# Patient Record
Sex: MALE | Race: WHITE | NOT HISPANIC OR LATINO | Employment: OTHER | URBAN - NONMETROPOLITAN AREA
[De-identification: names, ages, dates, MRNs, and addresses within clinical notes are randomized per-mention and may not be internally consistent; named-entity substitution may affect disease eponyms.]

---

## 2017-01-10 ENCOUNTER — COMMUNICATION - GICH (OUTPATIENT)
Dept: INTERNAL MEDICINE | Facility: OTHER | Age: 47
End: 2017-01-10

## 2017-01-10 ENCOUNTER — AMBULATORY - GICH (OUTPATIENT)
Dept: SCHEDULING | Facility: OTHER | Age: 47
End: 2017-01-10

## 2017-03-01 ENCOUNTER — COMMUNICATION - GICH (OUTPATIENT)
Dept: INTERNAL MEDICINE | Facility: OTHER | Age: 47
End: 2017-03-01

## 2017-04-17 ENCOUNTER — COMMUNICATION - GICH (OUTPATIENT)
Dept: INTERNAL MEDICINE | Facility: OTHER | Age: 47
End: 2017-04-17

## 2017-04-21 ENCOUNTER — OFFICE VISIT - GICH (OUTPATIENT)
Dept: INTERNAL MEDICINE | Facility: OTHER | Age: 47
End: 2017-04-21

## 2017-04-21 ENCOUNTER — AMBULATORY - GICH (OUTPATIENT)
Dept: SCHEDULING | Facility: OTHER | Age: 47
End: 2017-04-21

## 2017-04-21 ENCOUNTER — HISTORY (OUTPATIENT)
Dept: INTERNAL MEDICINE | Facility: OTHER | Age: 47
End: 2017-04-21

## 2017-04-21 DIAGNOSIS — E66.01 MORBID (SEVERE) OBESITY DUE TO EXCESS CALORIES (H): ICD-10-CM

## 2017-04-21 DIAGNOSIS — M54.42 LOW BACK PAIN WITH LEFT-SIDED SCIATICA: ICD-10-CM

## 2017-04-21 DIAGNOSIS — G89.29 OTHER CHRONIC PAIN: ICD-10-CM

## 2017-04-21 DIAGNOSIS — M15.0 PRIMARY GENERALIZED (OSTEO)ARTHRITIS: ICD-10-CM

## 2017-04-21 DIAGNOSIS — E78.2 MIXED HYPERLIPIDEMIA: ICD-10-CM

## 2017-04-21 DIAGNOSIS — F32.9 MAJOR DEPRESSIVE DISORDER, SINGLE EPISODE: ICD-10-CM

## 2017-04-21 DIAGNOSIS — F41.9 ANXIETY DISORDER: ICD-10-CM

## 2017-04-21 DIAGNOSIS — R73.9 HYPERGLYCEMIA: ICD-10-CM

## 2017-04-21 DIAGNOSIS — E11.65 TYPE 2 DIABETES MELLITUS WITH HYPERGLYCEMIA (H): ICD-10-CM

## 2017-04-21 LAB
A/G RATIO - HISTORICAL: 1.9 (ref 1–2)
ALBUMIN SERPL-MCNC: 4 G/DL (ref 3.5–5.7)
ALP SERPL-CCNC: 41 IU/L (ref 34–104)
ALT (SGPT) - HISTORICAL: 49 IU/L (ref 7–52)
ANION GAP - HISTORICAL: 9 (ref 5–18)
AST SERPL-CCNC: 37 IU/L (ref 13–39)
BILIRUB SERPL-MCNC: 1 MG/DL (ref 0.3–1)
BUN SERPL-MCNC: 12 MG/DL (ref 7–25)
BUN/CREAT RATIO - HISTORICAL: 13
CALCIUM SERPL-MCNC: 9.3 MG/DL (ref 8.6–10.3)
CHLORIDE SERPLBLD-SCNC: 105 MMOL/L (ref 98–107)
CHOL/HDL RATIO - HISTORICAL: 6.35
CHOLESTEROL TOTAL: 235 MG/DL
CO2 SERPL-SCNC: 22 MMOL/L (ref 21–31)
CREAT SERPL-MCNC: 0.9 MG/DL (ref 0.7–1.3)
ERYTHROCYTE [DISTWIDTH] IN BLOOD BY AUTOMATED COUNT: 12.9 % (ref 11.5–15.5)
ESTIMATED AVERAGE GLUCOSE: 246 MG/DL
GFR IF NOT AFRICAN AMERICAN - HISTORICAL: >60 ML/MIN/1.73M2
GLOBULIN - HISTORICAL: 2.1 G/DL (ref 2–3.7)
GLUCOSE SERPL-MCNC: 303 MG/DL (ref 70–105)
HCT VFR BLD AUTO: 50.1 % (ref 37–53)
HDLC SERPL-MCNC: 37 MG/DL (ref 23–92)
HEMOGLOBIN A1C MONITORING (POCT) - HISTORICAL: 10.2 % (ref 4–6.2)
HEMOGLOBIN: 16.5 G/DL (ref 13.5–17.5)
LDLC SERPL CALC-MCNC: 128 MG/DL
MCH RBC QN AUTO: 26.7 PG (ref 26–34)
MCHC RBC AUTO-ENTMCNC: 32.9 G/DL (ref 32–36)
MCV RBC AUTO: 81 FL (ref 80–100)
NON-HDL CHOLESTEROL - HISTORICAL: 198 MG/DL
PATIENT STATUS - HISTORICAL: ABNORMAL
PLATELET # BLD AUTO: 141 THOU/CU MM (ref 140–440)
PMV BLD: 9.3 FL (ref 6.5–11)
POTASSIUM SERPL-SCNC: 4 MMOL/L (ref 3.5–5.1)
PROT SERPL-MCNC: 6.1 G/DL (ref 6.4–8.9)
RED BLOOD COUNT - HISTORICAL: 6.16 MIL/CU MM (ref 4.3–5.9)
SODIUM SERPL-SCNC: 136 MMOL/L (ref 133–143)
TRIGL SERPL-MCNC: 349 MG/DL
WHITE BLOOD COUNT - HISTORICAL: 4.6 THOU/CU MM (ref 4.5–11)

## 2017-04-21 ASSESSMENT — ANXIETY QUESTIONNAIRES
3. WORRYING TOO MUCH ABOUT DIFFERENT THINGS: NOT AT ALL
GAD7 TOTAL SCORE: 0
2. NOT BEING ABLE TO STOP OR CONTROL WORRYING: NOT AT ALL
6. BECOMING EASILY ANNOYED OR IRRITABLE: NOT AT ALL
1. FEELING NERVOUS, ANXIOUS, OR ON EDGE: NOT AT ALL
7. FEELING AFRAID AS IF SOMETHING AWFUL MIGHT HAPPEN: NOT AT ALL
5. BEING SO RESTLESS THAT IT IS HARD TO SIT STILL: NOT AT ALL
4. TROUBLE RELAXING: NOT AT ALL

## 2017-04-21 ASSESSMENT — PATIENT HEALTH QUESTIONNAIRE - PHQ9: SUM OF ALL RESPONSES TO PHQ QUESTIONS 1-9: 0

## 2017-04-25 ENCOUNTER — COMMUNICATION - GICH (OUTPATIENT)
Dept: INTERNAL MEDICINE | Facility: OTHER | Age: 47
End: 2017-04-25

## 2017-04-25 ENCOUNTER — AMBULATORY - GICH (OUTPATIENT)
Dept: SCHEDULING | Facility: OTHER | Age: 47
End: 2017-04-25

## 2017-05-01 ENCOUNTER — AMBULATORY - GICH (OUTPATIENT)
Dept: INTERNAL MEDICINE | Facility: OTHER | Age: 47
End: 2017-05-01

## 2017-05-01 DIAGNOSIS — G89.29 OTHER CHRONIC PAIN: ICD-10-CM

## 2017-05-01 DIAGNOSIS — M54.42 LOW BACK PAIN WITH LEFT-SIDED SCIATICA: ICD-10-CM

## 2017-05-01 DIAGNOSIS — M48.061 SPINAL STENOSIS OF LUMBAR REGION: ICD-10-CM

## 2017-05-15 ENCOUNTER — HOSPITAL ENCOUNTER (OUTPATIENT)
Dept: RADIOLOGY | Facility: OTHER | Age: 47
End: 2017-05-15
Attending: INTERNAL MEDICINE

## 2017-05-15 DIAGNOSIS — M15.0 PRIMARY GENERALIZED (OSTEO)ARTHRITIS: ICD-10-CM

## 2017-05-24 ENCOUNTER — AMBULATORY - GICH (OUTPATIENT)
Dept: SCHEDULING | Facility: OTHER | Age: 47
End: 2017-05-24

## 2017-06-26 ENCOUNTER — COMMUNICATION - GICH (OUTPATIENT)
Dept: INTERNAL MEDICINE | Facility: OTHER | Age: 47
End: 2017-06-26

## 2017-06-26 ENCOUNTER — HOSPITAL ENCOUNTER (OUTPATIENT)
Dept: RADIOLOGY | Facility: OTHER | Age: 47
End: 2017-06-26
Attending: INTERNAL MEDICINE

## 2017-06-26 DIAGNOSIS — M54.42 LOW BACK PAIN WITH LEFT-SIDED SCIATICA: ICD-10-CM

## 2017-06-26 DIAGNOSIS — M79.644 PAIN OF FINGER OF RIGHT HAND: ICD-10-CM

## 2017-06-26 DIAGNOSIS — M79.645 PAIN OF FINGER OF LEFT HAND: ICD-10-CM

## 2017-06-26 DIAGNOSIS — G89.29 OTHER CHRONIC PAIN: ICD-10-CM

## 2017-06-29 ENCOUNTER — COMMUNICATION - GICH (OUTPATIENT)
Dept: INTERNAL MEDICINE | Facility: OTHER | Age: 47
End: 2017-06-29

## 2017-06-29 DIAGNOSIS — M15.0 PRIMARY GENERALIZED (OSTEO)ARTHRITIS: ICD-10-CM

## 2017-07-09 ENCOUNTER — AMBULATORY - GICH (OUTPATIENT)
Dept: SCHEDULING | Facility: OTHER | Age: 47
End: 2017-07-09

## 2017-07-11 ENCOUNTER — COMMUNICATION - GICH (OUTPATIENT)
Dept: INTERNAL MEDICINE | Facility: OTHER | Age: 47
End: 2017-07-11

## 2017-07-14 ENCOUNTER — HOSPITAL ENCOUNTER (OUTPATIENT)
Dept: RADIOLOGY | Facility: OTHER | Age: 47
End: 2017-07-14
Attending: INTERNAL MEDICINE

## 2017-07-14 DIAGNOSIS — M15.0 PRIMARY GENERALIZED (OSTEO)ARTHRITIS: ICD-10-CM

## 2017-07-26 ENCOUNTER — COMMUNICATION - GICH (OUTPATIENT)
Dept: FAMILY MEDICINE | Facility: OTHER | Age: 47
End: 2017-07-26

## 2017-07-26 DIAGNOSIS — E11.65 TYPE 2 DIABETES MELLITUS WITH HYPERGLYCEMIA (H): ICD-10-CM

## 2017-07-27 ENCOUNTER — OFFICE VISIT - GICH (OUTPATIENT)
Dept: INTERNAL MEDICINE | Facility: OTHER | Age: 47
End: 2017-07-27

## 2017-07-27 ENCOUNTER — HISTORY (OUTPATIENT)
Dept: INTERNAL MEDICINE | Facility: OTHER | Age: 47
End: 2017-07-27

## 2017-07-27 DIAGNOSIS — F41.9 ANXIETY DISORDER: ICD-10-CM

## 2017-07-27 DIAGNOSIS — F32.9 MAJOR DEPRESSIVE DISORDER, SINGLE EPISODE: ICD-10-CM

## 2017-07-27 DIAGNOSIS — M25.561 PAIN IN RIGHT KNEE: ICD-10-CM

## 2017-07-27 DIAGNOSIS — E11.65 TYPE 2 DIABETES MELLITUS WITH HYPERGLYCEMIA (H): ICD-10-CM

## 2017-07-27 DIAGNOSIS — Z99.89 DEPENDENCE ON OTHER ENABLING MACHINES AND DEVICES: ICD-10-CM

## 2017-07-27 DIAGNOSIS — M62.830 MUSCLE SPASM OF BACK: ICD-10-CM

## 2017-07-27 DIAGNOSIS — G47.33 OBSTRUCTIVE SLEEP APNEA: ICD-10-CM

## 2017-07-27 DIAGNOSIS — M15.0 PRIMARY GENERALIZED (OSTEO)ARTHRITIS: ICD-10-CM

## 2017-07-27 DIAGNOSIS — E66.01 MORBID (SEVERE) OBESITY DUE TO EXCESS CALORIES (H): ICD-10-CM

## 2017-07-27 DIAGNOSIS — R73.9 HYPERGLYCEMIA: ICD-10-CM

## 2017-07-27 DIAGNOSIS — M25.562 PAIN IN LEFT KNEE: ICD-10-CM

## 2017-07-27 DIAGNOSIS — G89.29 OTHER CHRONIC PAIN: ICD-10-CM

## 2017-07-27 DIAGNOSIS — E78.2 MIXED HYPERLIPIDEMIA: ICD-10-CM

## 2017-07-27 LAB
A/G RATIO - HISTORICAL: 1.4 (ref 1–2)
ALB RAND URINE - HISTORICAL: 9.3 MG/L
ALBUMIN SERPL-MCNC: 4.2 G/DL (ref 3.5–5.7)
ALP SERPL-CCNC: 34 IU/L (ref 34–104)
ALT (SGPT) - HISTORICAL: 35 IU/L (ref 7–52)
ANION GAP - HISTORICAL: 12 (ref 5–18)
AST SERPL-CCNC: 23 IU/L (ref 13–39)
BACTERIA URINE: NORMAL BACTERIA/HPF
BILIRUB SERPL-MCNC: 1.1 MG/DL (ref 0.3–1)
BILIRUB UR QL: NEGATIVE
BUN SERPL-MCNC: 15 MG/DL (ref 7–25)
BUN/CREAT RATIO - HISTORICAL: 17
CALCIUM SERPL-MCNC: 9.6 MG/DL (ref 8.6–10.3)
CHLORIDE SERPLBLD-SCNC: 102 MMOL/L (ref 98–107)
CHOL/HDL RATIO - HISTORICAL: 4.76
CHOLESTEROL TOTAL: 233 MG/DL
CLARITY, URINE: CLEAR CLARITY
CO2 SERPL-SCNC: 25 MMOL/L (ref 21–31)
COLOR UR: YELLOW COLOR
CREAT SERPL-MCNC: 0.86 MG/DL (ref 0.7–1.3)
CREATININE, URINE - HISTORICAL: 1.93 G/L
EPITHELIAL CELLS: NORMAL EPI/HPF
ERYTHROCYTE [DISTWIDTH] IN BLOOD BY AUTOMATED COUNT: 14.2 % (ref 11.5–15.5)
ESTIMATED AVERAGE GLUCOSE: 206 MG/DL
GFR IF NOT AFRICAN AMERICAN - HISTORICAL: >60 ML/MIN/1.73M2
GLOBULIN - HISTORICAL: 2.9 G/DL (ref 2–3.7)
GLUCOSE SERPL-MCNC: 178 MG/DL (ref 70–105)
GLUCOSE URINE: 100 MG/DL
HCT VFR BLD AUTO: 50.5 % (ref 37–53)
HDLC SERPL-MCNC: 49 MG/DL (ref 23–92)
HEMOGLOBIN A1C MONITORING (POCT) - HISTORICAL: 8.8 % (ref 4–6.2)
HEMOGLOBIN: 16.8 G/DL (ref 13.5–17.5)
KETONES UR QL: NEGATIVE MG/DL
LDLC SERPL CALC-MCNC: 145 MG/DL
LEUKOCYTE ESTERASE URINE: NEGATIVE
MCH RBC QN AUTO: 26.8 PG (ref 26–34)
MCHC RBC AUTO-ENTMCNC: 33.3 G/DL (ref 32–36)
MCV RBC AUTO: 80 FL (ref 80–100)
MICROALBUMIN, RAND UR - HISTORICAL: 4.8 MG/G CREAT
NITRITE UR QL STRIP: NEGATIVE
NON-HDL CHOLESTEROL - HISTORICAL: 184 MG/DL
OCCULT BLOOD,URINE - HISTORICAL: NEGATIVE
PATIENT STATUS - HISTORICAL: ABNORMAL
PH UR: 5.5 [PH]
PLATELET # BLD AUTO: 152 THOU/CU MM (ref 140–440)
PMV BLD: 10.9 FL (ref 6.5–11)
POTASSIUM SERPL-SCNC: 4.3 MMOL/L (ref 3.5–5.1)
PROT SERPL-MCNC: 7.1 G/DL (ref 6.4–8.9)
PROTEIN QUALITATIVE,URINE - HISTORICAL: NEGATIVE MG/DL
RBC - HISTORICAL: NORMAL /HPF
RED BLOOD COUNT - HISTORICAL: 6.28 MIL/CU MM (ref 4.3–5.9)
SODIUM SERPL-SCNC: 139 MMOL/L (ref 133–143)
SP GR UR STRIP: 1.02
TRIGL SERPL-MCNC: 194 MG/DL
UROBILINOGEN,QUALITATIVE - HISTORICAL: NORMAL EU/DL
WBC - HISTORICAL: NORMAL /HPF
WHITE BLOOD COUNT - HISTORICAL: 6.8 THOU/CU MM (ref 4.5–11)

## 2017-07-27 ASSESSMENT — ANXIETY QUESTIONNAIRES
4. TROUBLE RELAXING: NOT AT ALL
GAD7 TOTAL SCORE: 0
7. FEELING AFRAID AS IF SOMETHING AWFUL MIGHT HAPPEN: NOT AT ALL
5. BEING SO RESTLESS THAT IT IS HARD TO SIT STILL: NOT AT ALL
2. NOT BEING ABLE TO STOP OR CONTROL WORRYING: NOT AT ALL
1. FEELING NERVOUS, ANXIOUS, OR ON EDGE: NOT AT ALL
6. BECOMING EASILY ANNOYED OR IRRITABLE: NOT AT ALL
3. WORRYING TOO MUCH ABOUT DIFFERENT THINGS: NOT AT ALL

## 2017-08-17 ENCOUNTER — AMBULATORY - GICH (OUTPATIENT)
Dept: INTERNAL MEDICINE | Facility: OTHER | Age: 47
End: 2017-08-17

## 2017-08-17 DIAGNOSIS — M18.0 PRIMARY OSTEOARTHRITIS OF BOTH FIRST CARPOMETACARPAL JOINTS: ICD-10-CM

## 2017-08-21 ENCOUNTER — COMMUNICATION - GICH (OUTPATIENT)
Dept: INTERNAL MEDICINE | Facility: OTHER | Age: 47
End: 2017-08-21

## 2017-08-21 ENCOUNTER — AMBULATORY - GICH (OUTPATIENT)
Dept: INTERNAL MEDICINE | Facility: OTHER | Age: 47
End: 2017-08-21

## 2017-08-21 DIAGNOSIS — M18.0 PRIMARY OSTEOARTHRITIS OF BOTH FIRST CARPOMETACARPAL JOINTS: ICD-10-CM

## 2017-08-23 ENCOUNTER — COMMUNICATION - GICH (OUTPATIENT)
Dept: INTERNAL MEDICINE | Facility: OTHER | Age: 47
End: 2017-08-23

## 2017-08-23 ENCOUNTER — HOSPITAL ENCOUNTER (OUTPATIENT)
Dept: RADIOLOGY | Facility: OTHER | Age: 47
End: 2017-08-23
Attending: INTERNAL MEDICINE

## 2017-08-23 DIAGNOSIS — M18.0 PRIMARY OSTEOARTHRITIS OF BOTH FIRST CARPOMETACARPAL JOINTS: ICD-10-CM

## 2017-08-29 ENCOUNTER — AMBULATORY - GICH (OUTPATIENT)
Dept: SCHEDULING | Facility: OTHER | Age: 47
End: 2017-08-29

## 2017-09-06 ENCOUNTER — COMMUNICATION - GICH (OUTPATIENT)
Dept: INTERNAL MEDICINE | Facility: OTHER | Age: 47
End: 2017-09-06

## 2017-09-08 ENCOUNTER — HISTORY (OUTPATIENT)
Dept: INTERNAL MEDICINE | Facility: OTHER | Age: 47
End: 2017-09-08

## 2017-09-08 ENCOUNTER — OFFICE VISIT - GICH (OUTPATIENT)
Dept: INTERNAL MEDICINE | Facility: OTHER | Age: 47
End: 2017-09-08

## 2017-09-08 ENCOUNTER — COMMUNICATION - GICH (OUTPATIENT)
Dept: INTERNAL MEDICINE | Facility: OTHER | Age: 47
End: 2017-09-08

## 2017-09-08 DIAGNOSIS — G47.33 OBSTRUCTIVE SLEEP APNEA: ICD-10-CM

## 2017-09-08 DIAGNOSIS — M18.0 PRIMARY OSTEOARTHRITIS OF BOTH FIRST CARPOMETACARPAL JOINTS: ICD-10-CM

## 2017-09-08 DIAGNOSIS — E55.9 VITAMIN D DEFICIENCY: ICD-10-CM

## 2017-09-08 DIAGNOSIS — Z99.89 DEPENDENCE ON OTHER ENABLING MACHINES AND DEVICES: ICD-10-CM

## 2017-09-08 DIAGNOSIS — F41.9 ANXIETY DISORDER: ICD-10-CM

## 2017-09-08 DIAGNOSIS — R73.9 HYPERGLYCEMIA: ICD-10-CM

## 2017-09-08 DIAGNOSIS — F32.9 MAJOR DEPRESSIVE DISORDER, SINGLE EPISODE: ICD-10-CM

## 2017-09-08 DIAGNOSIS — E11.65 TYPE 2 DIABETES MELLITUS WITH HYPERGLYCEMIA (H): ICD-10-CM

## 2017-09-08 ASSESSMENT — PATIENT HEALTH QUESTIONNAIRE - PHQ9: SUM OF ALL RESPONSES TO PHQ QUESTIONS 1-9: 0

## 2017-09-18 ENCOUNTER — COMMUNICATION - GICH (OUTPATIENT)
Dept: INTERNAL MEDICINE | Facility: OTHER | Age: 47
End: 2017-09-18

## 2017-09-18 DIAGNOSIS — F13.239: ICD-10-CM

## 2017-09-26 ENCOUNTER — COMMUNICATION - GICH (OUTPATIENT)
Dept: INTERNAL MEDICINE | Facility: OTHER | Age: 47
End: 2017-09-26

## 2017-09-26 DIAGNOSIS — F41.9 ANXIETY DISORDER: ICD-10-CM

## 2017-09-26 DIAGNOSIS — F13.239: ICD-10-CM

## 2017-10-04 ENCOUNTER — OFFICE VISIT - GICH (OUTPATIENT)
Dept: INTERNAL MEDICINE | Facility: OTHER | Age: 47
End: 2017-10-04

## 2017-10-04 ENCOUNTER — HISTORY (OUTPATIENT)
Dept: INTERNAL MEDICINE | Facility: OTHER | Age: 47
End: 2017-10-04

## 2017-10-04 DIAGNOSIS — M18.0 PRIMARY OSTEOARTHRITIS OF BOTH FIRST CARPOMETACARPAL JOINTS: ICD-10-CM

## 2017-10-04 DIAGNOSIS — F41.8 OTHER SPECIFIED ANXIETY DISORDERS: ICD-10-CM

## 2017-10-04 DIAGNOSIS — Z23 ENCOUNTER FOR IMMUNIZATION: ICD-10-CM

## 2017-10-04 DIAGNOSIS — M15.0 PRIMARY GENERALIZED (OSTEO)ARTHRITIS: ICD-10-CM

## 2017-10-04 ASSESSMENT — ANXIETY QUESTIONNAIRES
2. NOT BEING ABLE TO STOP OR CONTROL WORRYING: NOT AT ALL
6. BECOMING EASILY ANNOYED OR IRRITABLE: NOT AT ALL
3. WORRYING TOO MUCH ABOUT DIFFERENT THINGS: NOT AT ALL
1. FEELING NERVOUS, ANXIOUS, OR ON EDGE: NOT AT ALL
4. TROUBLE RELAXING: NOT AT ALL
GAD7 TOTAL SCORE: 0
5. BEING SO RESTLESS THAT IT IS HARD TO SIT STILL: NOT AT ALL
7. FEELING AFRAID AS IF SOMETHING AWFUL MIGHT HAPPEN: NOT AT ALL

## 2017-10-04 ASSESSMENT — PATIENT HEALTH QUESTIONNAIRE - PHQ9: SUM OF ALL RESPONSES TO PHQ QUESTIONS 1-9: 0

## 2017-10-09 ENCOUNTER — COMMUNICATION - GICH (OUTPATIENT)
Dept: INTERNAL MEDICINE | Facility: OTHER | Age: 47
End: 2017-10-09

## 2017-10-09 DIAGNOSIS — M18.0 PRIMARY OSTEOARTHRITIS OF BOTH FIRST CARPOMETACARPAL JOINTS: ICD-10-CM

## 2017-10-09 DIAGNOSIS — F41.8 OTHER SPECIFIED ANXIETY DISORDERS: ICD-10-CM

## 2017-10-10 ENCOUNTER — HISTORY (OUTPATIENT)
Dept: INTERNAL MEDICINE | Facility: OTHER | Age: 47
End: 2017-10-10

## 2017-10-10 ENCOUNTER — OFFICE VISIT - GICH (OUTPATIENT)
Dept: INTERNAL MEDICINE | Facility: OTHER | Age: 47
End: 2017-10-10

## 2017-10-10 DIAGNOSIS — M15.0 PRIMARY GENERALIZED (OSTEO)ARTHRITIS: ICD-10-CM

## 2017-10-10 DIAGNOSIS — M18.0 PRIMARY OSTEOARTHRITIS OF BOTH FIRST CARPOMETACARPAL JOINTS: ICD-10-CM

## 2017-10-10 DIAGNOSIS — F41.8 OTHER SPECIFIED ANXIETY DISORDERS: ICD-10-CM

## 2017-10-10 ASSESSMENT — ANXIETY QUESTIONNAIRES
2. NOT BEING ABLE TO STOP OR CONTROL WORRYING: NOT AT ALL
3. WORRYING TOO MUCH ABOUT DIFFERENT THINGS: NOT AT ALL
6. BECOMING EASILY ANNOYED OR IRRITABLE: NOT AT ALL
1. FEELING NERVOUS, ANXIOUS, OR ON EDGE: NOT AT ALL
4. TROUBLE RELAXING: NOT AT ALL
GAD7 TOTAL SCORE: 0
5. BEING SO RESTLESS THAT IT IS HARD TO SIT STILL: NOT AT ALL
7. FEELING AFRAID AS IF SOMETHING AWFUL MIGHT HAPPEN: NOT AT ALL

## 2017-10-10 ASSESSMENT — PATIENT HEALTH QUESTIONNAIRE - PHQ9: SUM OF ALL RESPONSES TO PHQ QUESTIONS 1-9: 0

## 2017-10-13 ENCOUNTER — AMBULATORY - GICH (OUTPATIENT)
Dept: SCHEDULING | Facility: OTHER | Age: 47
End: 2017-10-13

## 2017-11-09 ENCOUNTER — COMMUNICATION - GICH (OUTPATIENT)
Dept: INTERNAL MEDICINE | Facility: OTHER | Age: 47
End: 2017-11-09

## 2017-12-05 ENCOUNTER — COMMUNICATION - GICH (OUTPATIENT)
Dept: INTERNAL MEDICINE | Facility: OTHER | Age: 47
End: 2017-12-05

## 2017-12-05 DIAGNOSIS — M15.0 PRIMARY GENERALIZED (OSTEO)ARTHRITIS: ICD-10-CM

## 2017-12-05 DIAGNOSIS — M62.830 MUSCLE SPASM OF BACK: ICD-10-CM

## 2017-12-12 ENCOUNTER — AMBULATORY - GICH (OUTPATIENT)
Dept: INTERNAL MEDICINE | Facility: OTHER | Age: 47
End: 2017-12-12

## 2017-12-12 ENCOUNTER — HISTORY (OUTPATIENT)
Dept: INTERNAL MEDICINE | Facility: OTHER | Age: 47
End: 2017-12-12

## 2017-12-12 DIAGNOSIS — Z99.89 DEPENDENCE ON OTHER ENABLING MACHINES AND DEVICES: ICD-10-CM

## 2017-12-12 DIAGNOSIS — R73.9 HYPERGLYCEMIA: ICD-10-CM

## 2017-12-12 DIAGNOSIS — E78.2 MIXED HYPERLIPIDEMIA: ICD-10-CM

## 2017-12-12 DIAGNOSIS — T38.0X5A ADVERSE EFFECT OF GLUCOCORTICOID OR SYNTHETIC ANALOGUE: ICD-10-CM

## 2017-12-12 DIAGNOSIS — F41.8 OTHER SPECIFIED ANXIETY DISORDERS: ICD-10-CM

## 2017-12-12 DIAGNOSIS — E11.9 TYPE 2 DIABETES MELLITUS WITHOUT COMPLICATIONS (H): ICD-10-CM

## 2017-12-12 DIAGNOSIS — M18.0 PRIMARY OSTEOARTHRITIS OF BOTH FIRST CARPOMETACARPAL JOINTS: ICD-10-CM

## 2017-12-12 DIAGNOSIS — Z01.818 ENCOUNTER FOR OTHER PREPROCEDURAL EXAMINATION: ICD-10-CM

## 2017-12-12 DIAGNOSIS — G47.33 OBSTRUCTIVE SLEEP APNEA: ICD-10-CM

## 2017-12-12 DIAGNOSIS — E55.9 VITAMIN D DEFICIENCY: ICD-10-CM

## 2017-12-12 DIAGNOSIS — E66.01 MORBID (SEVERE) OBESITY DUE TO EXCESS CALORIES (H): ICD-10-CM

## 2017-12-12 LAB
A/G RATIO - HISTORICAL: 1.3 (ref 1–2)
ALBUMIN SERPL-MCNC: 4 G/DL (ref 3.5–5.7)
ALP SERPL-CCNC: 36 IU/L (ref 34–104)
ALT (SGPT) - HISTORICAL: 28 IU/L (ref 7–52)
ANION GAP - HISTORICAL: 10 (ref 5–18)
AST SERPL-CCNC: 26 IU/L (ref 13–39)
BILIRUB SERPL-MCNC: 1 MG/DL (ref 0.3–1)
BUN SERPL-MCNC: 14 MG/DL (ref 7–25)
BUN/CREAT RATIO - HISTORICAL: 19
CALCIUM SERPL-MCNC: 9.1 MG/DL (ref 8.6–10.3)
CHLORIDE SERPLBLD-SCNC: 104 MMOL/L (ref 98–107)
CHOL/HDL RATIO - HISTORICAL: 5.33
CHOLESTEROL TOTAL: 229 MG/DL
CO2 SERPL-SCNC: 22 MMOL/L (ref 21–31)
CREAT SERPL-MCNC: 0.75 MG/DL (ref 0.7–1.3)
ERYTHROCYTE [DISTWIDTH] IN BLOOD BY AUTOMATED COUNT: 13.9 % (ref 11.5–15.5)
ESTIMATED AVERAGE GLUCOSE: 171 MG/DL
GFR IF NOT AFRICAN AMERICAN - HISTORICAL: >60 ML/MIN/1.73M2
GLOBULIN - HISTORICAL: 3.2 G/DL (ref 2–3.7)
GLUCOSE SERPL-MCNC: 107 MG/DL (ref 70–105)
HCT VFR BLD AUTO: 50.1 % (ref 37–53)
HDLC SERPL-MCNC: 43 MG/DL (ref 23–92)
HEMOGLOBIN A1C MONITORING (POCT) - HISTORICAL: 7.6 % (ref 4–6.2)
HEMOGLOBIN: 17 G/DL (ref 13.5–17.5)
LDLC SERPL CALC-MCNC: 138 MG/DL
MCH RBC QN AUTO: 26.8 PG (ref 26–34)
MCHC RBC AUTO-ENTMCNC: 33.9 G/DL (ref 32–36)
MCV RBC AUTO: 79 FL (ref 80–100)
NON-HDL CHOLESTEROL - HISTORICAL: 186 MG/DL
PLATELET # BLD AUTO: 162 THOU/CU MM (ref 140–440)
PMV BLD: 10.8 FL (ref 6.5–11)
POTASSIUM SERPL-SCNC: 3.9 MMOL/L (ref 3.5–5.1)
PROT SERPL-MCNC: 7.2 G/DL (ref 6.4–8.9)
PROVIDER ORDERDED STATUS - HISTORICAL: ABNORMAL
RED BLOOD COUNT - HISTORICAL: 6.35 MIL/CU MM (ref 4.3–5.9)
SODIUM SERPL-SCNC: 136 MMOL/L (ref 133–143)
TRIGL SERPL-MCNC: 239 MG/DL
VITAMIN D TOTAL - HISTORICAL: 20.3 NG/ML
WHITE BLOOD COUNT - HISTORICAL: 5.9 THOU/CU MM (ref 4.5–11)

## 2017-12-12 ASSESSMENT — PATIENT HEALTH QUESTIONNAIRE - PHQ9: SUM OF ALL RESPONSES TO PHQ QUESTIONS 1-9: 0

## 2017-12-19 ENCOUNTER — AMBULATORY - GICH (OUTPATIENT)
Dept: SCHEDULING | Facility: OTHER | Age: 47
End: 2017-12-19

## 2017-12-27 NOTE — PROGRESS NOTES
Patient Information     Patient Name MRN Sex     Shelton Puentes [id number] Male 1970      Progress Notes by Stacia Perez at 2017 11:18 AM     Author:  Stacia Perez Service:  (none) Author Type:  (none)     Filed:  2017 11:18 AM Date of Service:  2017 11:18 AM Status:  Signed     :  Stacia Perez            [place] Protocol    A. Pre-procedure verification complete yes  1-relevant information / documentation available, reviewed and properly matched to the patient; 2-consent accurate and complete, 3-equipment and supplies available    B. Site marking complete Yes  Site marked if not in continuous attendance with patient    C. TIME OUT completed yes  Time Out was conducted just prior to starting procedure to verify the eight required elements: 1-patient identity, 2-consent accurate and complete, 3-position, 4-correct side/site marked (if applicable), 5-procedure, 6-relevant images / results properly labeled and displayed (if applicable), 7-antibiotics / irrigation fluids (if applicable), 8-safety precautions.

## 2017-12-27 NOTE — PROGRESS NOTES
Patient Information     Patient Name MRN Sex Shelton Benson [id number] Male 1970      Progress Notes by Stacia Perez at 2017  9:48 AM     Author:  Stacia Perez Service:  (none) Author Type:  (none)     Filed:  2017  9:48 AM Date of Service:  2017  9:48 AM Status:  Signed     :  Stacia Perez            Falls Risk Criteria:    Age 65 and older or under age 4        Sensory deficits    Poor vision    Use of ambulatory aides    Impaired judgment    Unable to walk independently    Meets High Risk criteria for falls:  no

## 2017-12-27 NOTE — PROGRESS NOTES
Patient Information     Patient Name MRN Shelton Lopez [id number] Male 1970      Progress Notes by Scott Johnston MD at 2017 11:00 AM     Author:  Scott Johnston MD Service:  (none) Author Type:  Physician     Filed:  2017  1:49 AM Encounter Date:  2017 Status:  Signed     :  Scott Johnston MD (Physician)            Nursing Notes:   Reanna Cedeno KISHORE  2017 11:35 AM  Signed  Patient presents to the clinic for diabetes management.  Patient express concerns about abdominal discomfort.  Last eye exam was 2017.      Reanna CedenoDANIEL        2017 11:27 AM    Shelton Puentes presents to clinic today for:   Chief Complaint    Patient presents with      Diabetes     HPI: Mr. Puentes is a 46 y.o. male who presents today for evaluation of above.     (E11.65) Uncontrolled type 2 diabetes mellitus without complication, without long-term current use of insulin (HC)  (primary encounter diagnosis)  (R73.9) Steroid-induced hyperglycemia  (F41.9,  F32.9) Anxiety and depression  (G47.33,  Z99.89) GWEN on CPAP  (E78.2) Mixed hyperlipidemia  (E66.01,  Z68.42) Morbid obesity with BMI of 45.0-49.9, adult (HC)  (M25.561,  M25.562,  G89.29) Chronic pain of both knees  (M15.0) Primary osteoarthritis involving multiple joints  (M62.830) Spasm of back muscles     Appears patient has Uncontrolled DM2 - labs today. A1c is very high.  He has not been watching his diet very closely.  He's been eating a lot of fresh food, high sugar Gatorade.  States his appetite has been reduced with tanzeum injections.  -- advised to check glucose 3x daily. Refills sent to pharmacy.   -- continue Tanzeum injections. Gets some mild stomach aches, like he is hungry, but no pain or other side-effects. Wondering about other med options, [place] can't take the syringes with him. Need to be refrigerated.    -- didn't tolerate metformin, due to GI  side-effects.   -- start Glipizide. -- titrate dose for glucose levels.  New Prescription sent to pharmacy.    HEMOGLOBIN A1C MONITORING (POCT) (%)    Date Value   2017 8.8 (H)     Patient recently has gotten some steroid injections into his hands, shortly thereafter got some more.  States that the pain has been becoming a lot more of a problem.  He still works full-time and once the steroid injections are wearing off he loses hand  strength.  He evidently is talk with the hand surgeon before they talked about possible fusion.  He is wondering about changing his medications, possibly dropping Celebrex trying Aleve/naproxen.  He is also wondering about Synvisc injections into the fingers.  Advises are probably not approved by the FDA but he would like to get orthopedics opinion on this.    Sleep apnea, uses CPAP.,  Seems to find it helpful.    Anxiety and depression, chronic issues for him.  Using Effexor which seems to help.    Hyperlipidemia, currently on Lipitor 40 mg daily.  Seems to be tolerating okay.  Check labs. -- Lipid levels are still quite elevated.  LDL is 145.  Last Lipids:  Chol: 2017 233   194  HDL: 2017 49   LDL: 2017 145    Morbid obesity, discussed need for reduced oral caloric intake.  Regular exercise.  States that his knees give him trouble.  He tends to continue to gain weight even know is using tanzeum.  Advised that he needs to stop eating carbohydrates and sugars is been eating a lot of these.  His wife is with today and states that he has been eating a lot of either natural sugar or carbohydrates.  She thought that natural sugar was fine but advised that this really doesn't matter all of it raises his blood sugar levels.    -- With use of tanzeum, he has lost about 17 pounds since 2016.    Back muscle spasms, finding Robaxin helpful.  Would like a refill today.    Mr. Puentes's Body mass index is 47.89 kg/(m^2). This is out of the normal  range for a 46 y.o. Normal range for ages 18+ is between 18.5 and 24.9. To lose weight we reviewed risks and benefits of appropriate options such as diet, exercise, and medications. Patient's strategy will be  none; patient is not ready to act   BP Readings from Last 1 Encounters:07/27/17 : 138/74  Mr. Reynolds blood pressure is out of the normal range for adults. Per JNC-8 guidelines normal adult blood pressure is < 120/80, pre-hypertensive is between 120/80 and 139/89, and hypertension is 140/90 or greater. Risks of hypertension were discussed. Patient's strategy will be weight loss and increased activity    Functional Capacity: > or about 4 METS.   Reports that he can climb a flight of stairs without any chest pain/heaviness or shortness of breath.   Patient reports no current symptoms of fevers, chills, nausea/vomiting.   No cough. No shortness of breath.   No change in bowel/bladder habits. No melena, hematochezia. No Hematuria.   No rashes. No palpitations.  No orthopnea/paroxysmal nocturnal dyspnea   No vision or hearing issues.   No significant mood issues - reports stable.   No bruising.     ASHVIN:  ASHVIN-7 ANXIETY SCREENING 4/21/2017 7/27/2017   ASHVIN date (doc flow) 4/21/2017 7/27/2017   Nervous, anxious 0 0   Cannot stop worrying 0 0   Worry about different things 0 0   Cannot relax 0 0   Feeling restless 0 0   Easily annoyed/irritated 0 0   Afraid of awful event 0 0   Score 0 0   Severity none none   Some recent data might be hidden         PHQ9:  PHQ Depression Screening 4/21/2017 7/27/2017   Date of PHQ exam (doc flow) 4/21/2017 7/27/2017   1. Lack of interest/pleasure 0 - Not at all 0 - Not at all   2. Feeling down/depressed 0 - Not at all 0 - Not at all   PHQ-2 TOTAL SCORE 0 0   3. Trouble sleeping 0 - Not at all 0 - Not at all   4. Decreased energy 0 - Not at all 0 - Not at all   5. Appetite change 0 - Not at all 0 - Not at all   6. Feelings of failure 0 - Not at all 0 - Not at all   7. Trouble  concentrating 0 - Not at all 0 - Not at all   8. Activity level 0 - Not at all 0 - Not at all   9. Hurting yourself 0 - Not at all 0 - Not at all   PHQ-9 TOTAL SCORE 0 0   PHQ-9 Severity Level none none   Functional Impairment not difficult at all not difficult at all   Some recent data might be hidden          I have personally reviewed the past medical history, past surgical history, medications, allergies, family and social history as listed below, on 7/27/2017.    Patient Active Problem List       Diagnosis  Date Noted     Uncontrolled type 2 diabetes mellitus without complication, without long-term current use of insulin (HC)  07/27/2017     Lumbar spinal stenosis  05/01/2017     Steroid-induced hyperglycemia  11/01/2016     Microcytic red blood cells  11/01/2016     Anxiety and depression  11/01/2016     GWEN on CPAP  11/01/2016     Mixed hyperlipidemia  11/01/2016     Morbid obesity with BMI of 45.0-49.9, adult (HC)  11/01/2016     Chronic pain of both knees  11/01/2016     Chronic midline low back pain with left-sided sciatica  11/01/2016     Cyst of left kidney  11/01/2016     Achilles tendonitis, bilateral  07/22/2016     Health care maintenance  07/01/2016     Colonoscopy: Age 50  ITALO/PSA: Given family history of early prostate disease, would check periodically to monitor for changes; last in 08/2015, recheck in 1-3 years  AAA screen: Not indicated  Immunizations: UTD on other vaccines.   Lipids/Annual Exam: Done 07/2016, repeat as needed for cholesterol management  Hepatitis C screen: not indicated                Vitamin D deficiency  09/23/2015     Family hx of prostate cancer  08/17/2015     Osteoarthritis of multiple joints  03/05/2015     Past Medical History:     Diagnosis  Date     Achilles tendonitis, bilateral 7/22/2016     Chronic back pain      Depression with anxiety      Obstructive sleep apnea     using nasal pillows; sleeping 8 hours      Osteoarthritis of multiple joints     has had  "injections       Past Surgical History:      Procedure  Laterality Date     TONSILLECTOMY  as a child     Current Outpatient Prescriptions       Medication  Sig Dispense Refill     acetaminophen (TYLENOL EXTRA STRGTH) 500 mg tablet Take 2 tablets by mouth every 6 hours if needed. Max acetaminophen dose: 4000mg in 24 hrs. 720 tablet 1     acetaminophen SR (TYLENOL 8 HOUR) 650 mg Extended-Release tablet Take 1 tablet by mouth up to 4 times daily as needed for pain 100 tablet 3     albiglutide (TANZEUM) 50 mg/0.5 mL injection Inject 50 mg subcutaneous once weekly. - Start 05/19/17 - after completion of 30 mg pen 4 Each 11     aspirin (ECOTRIN) 81 mg enteric coated tablet Take 1 tablet by mouth once daily with a meal. -- Do not start at this time, pending workup of stomach issues  0     atorvastatin (LIPITOR) 40 mg tablet Take 1 tablet by mouth once daily. 30 tablet 11     blood sugar diagnostic (ACCU-CHEK SMARTVIEW TEST STRIP) strip Dispense item covered by pt ins. E11.9 NIDDM type II - Test 3 times/day, Reason: High A1C 300 Strip 3     blood-glucose meter Dispense item covered by pt ins. E11.65 NIDDM type II, uncontrolled - Test 3 time/day 1 Device 0     cholecalciferol (VITAMIN D3) 5,000 unit capsule Take 1 capsule by mouth once daily. For Vitamin D Deficiency 100 capsule 3     EFFEXOR XR 75 mg cp24 Extended-Release capsule Take 2 capsules by mouth once daily with a meal. -- Brand Name only -- Dose Increase 4/21/2017 180 capsule 3     glipiZIDE (GLUCOTROL) 5 mg tablet Take 0.5-1 tablets by mouth 3 times daily before meals - New 7/27/2017 90 tablet 2     HAND SUPPORT misc As directed. Arthritis glove, bilateral. Wear on both hands as needed for pain. Dx: [facility] arthritis, 716.94 (M19.90). 2 Each 0     Insulin Needles, Disposable, (KO PEN NEEDLE) 32 gauge x 5/32\" As directed. For administering Tanzeum at home. Dx: E11.65 30 Each 3     lancets Dispense item covered by pt ins. E11.65 NIDDM type II, uncontrolled - Test 3 " times/day, Reason: High A1C, new start Glipizide 100 Each 11     lancets Dispense item covered by pt ins. E11.65 NIDDM type II, uncontrolled - Test 2 times/day 200 Each 3     lidocaine 5 % oint topical ointment Apply  topically to affected area(s) 3 times daily if needed for Other (Specify). 50 g 3     medication order composer OTC vitamin B12 3 tablets daily, vitamin D 1 tablet daily and omega capsule daily.  Patient is unsure of dosages at this time.       methocarbamol (ROBAXIN) 750 mg tablet Take 1 tablet by mouth 3 times daily. As needed for pain 90 tablet 11     naproxen (NAPROSYN) 500 mg tablet Take 1 tablet by mouth 2 times daily with meals. AS Needed for Pain -- Stop Celebrex 60 tablet 11     Allergies      Allergen   Reactions     Metformin  Other - Describe In Comment Field     Felt very foggy / groggy after taking, when in combination with Robaxin.       Family History      Problem  Relation Age of Onset     Psychiatric illness Father      Psychiatric illness Paternal Grandfather      Psychiatric illness Paternal Uncle      Family Status     Relation  Status     Father Alive    prostate cancer 60's      Mother Alive     Brother Alive    brain tumor age 15; recurrent issues with this      Paternal Grandfather      Paternal Uncle      Social History     Social History        Marital status:       Spouse name: N/A     Number of children:  N/A     Years of education:  N/A     Social History Main Topics          Smoking status:   Never Smoker      Smokeless tobacco:   Never Used      Alcohol use   0.0 oz/week     0 Standard drinks or equivalent per week        Comment: rarely       Drug use:   No      Sexual activity:   Yes      Partners:  Female      Other Topics  Concern     Not on file      Social History Narrative     Originally from [place]; moved back to [facility] in 2012 after living there for another 3-4 years with wife; , no children.  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         Complete ROS was performed and was negative unless otherwise noted in HPI above.     EXAM:   Vitals:     07/27/17 1125   BP: 138/74   Pulse: 80   Weight: (!) 162.4 kg (358 lb)     BP Readings from Last 3 Encounters:    07/27/17 138/74   04/21/17 134/86   11/01/16 120/80     Wt Readings from Last 3 Encounters:    07/27/17 (!) 162.4 kg (358 lb)   04/21/17 (!) 165.3 kg (364 lb 8 oz)   11/01/16 (!) 170.2 kg (375 lb 2 oz)     Estimated body mass index is 47.89 kg/(m^2) as calculated from the following:    Height as of 4/21/17: 1.842 m (6' 0.5\").    Weight as of this encounter: 162.4 kg (358 lb).     EXAM:  Constitutional: Pleasant, alert, appropriate appearance for age. No acute distress  Lymphatic Exam: Non-palpable nodes in neck, clavicular regions  Pulmonary: Lungs are clear to auscultation bilaterally, without wheezes or crackles  Cardiovascular Exam: regular rate and rhythm, no pedal edema  Gastrointestinal Exam: Obese, Soft, non-tender, non-distended, positive bowel sounds  Integument: No abnormal rashes, sores, or ulcerations noted  Neurologic Exam: CN 3-12 grossly intact   Musculoskeletal Exam: + multiple joint arthritis noted.  Gait and station appear grossly normal + hands limited, due to pain.   Psychiatric Exam: Awake and Alert, Affect and mood appropriate  Speech is fluent, Thought process is normal    INVESTIGATIONS:    Results for orders placed or performed in visit on 07/27/17      CBC W PLT NO DIFF      Result  Value Ref Range    WHITE BLOOD COUNT         6.8 4.5 - 11.0 thou/cu mm    RED BLOOD COUNT           6.28 (H) 4.30 - 5.90 mil/cu mm    HEMOGLOBIN                16.8 13.5 - 17.5 g/dL    HEMATOCRIT                50.5 37.0 - 53.0 %    MCV                       80 80 - 100 fL    MCH                       26.8 26.0 - 34.0 pg    MCHC                      33.3 32.0 - 36.0 g/dL    RDW                       14.2 11.5 - 15.5 %    PLATELET COUNT     "        152 140 - 440 thou/cu mm    MPV                       10.9 6.5 - 11.0 fL   COMP METABOLIC PANEL      Result  Value Ref Range    SODIUM 139 133 - 143 mmol/L    POTASSIUM 4.3 3.5 - 5.1 mmol/L    CHLORIDE 102 98 - 107 mmol/L    CO2,TOTAL 25 21 - 31 mmol/L    ANION GAP 12 5 - 18                    GLUCOSE 178 (H) 70 - 105 mg/dL    CALCIUM 9.6 8.6 - 10.3 mg/dL    BUN 15 7 - 25 mg/dL    CREATININE 0.86 0.70 - 1.30 mg/dL    BUN/CREAT RATIO           17                    GFR if African American >60 >60 ml/min/1.73m2    GFR if not African American >60 >60 ml/min/1.73m2    ALBUMIN 4.2 3.5 - 5.7 g/dL    PROTEIN,TOTAL 7.1 6.4 - 8.9 g/dL    GLOBULIN                  2.9 2.0 - 3.7 g/dL    A/G RATIO 1.4 1.0 - 2.0                    BILIRUBIN,TOTAL 1.1 (H) 0.3 - 1.0 mg/dL    ALK PHOSPHATASE 34 34 - 104 IU/L    ALT (SGPT) 35 7 - 52 IU/L    AST (SGOT) 23 13 - 39 IU/L   HEMOGLOBIN A1C MONITORING (POCT)      Result  Value Ref Range    HEMOGLOBIN A1C MONITORING (POCT) 8.8 (H) 4.0 - 6.2 %    ESTIMATED AVERAGE GLUCOSE  206 mg/dL   LIPID PANEL      Result  Value Ref Range    CHOLESTEROL,TOTAL 233 (H) <200 mg/dL    TRIGLYCERIDES 194 (H) <150 mg/dL    HDL CHOLESTEROL 49 23 - 92 mg/dL    NON-HDL CHOLESTEROL 184 (H) <145 mg/dl    CHOL/HDL RATIO            4.76 (H) <4.50                    LDL CHOLESTEROL 145 (H) <100 mg/dL    PATIENT STATUS            NON-FASTING                   URINALYSIS W REFLEX MICROSCOPIC IF POSITIVE      Result  Value Ref Range    COLOR                     Yellow Yellow Color    CLARITY                   Clear Clear Clarity    SPECIFIC GRAVITY,URINE    1.025 1.010, 1.015, 1.020, 1.025                    PH,URINE                  5.5 6.0, 7.0, 8.0, 5.5, 6.5, 7.5, 8.5                    UROBILINOGEN,QUALITATIVE  Normal Normal EU/dl    PROTEIN, URINE Negative Negative mg/dL    GLUCOSE, URINE 100 (A) Negative mg/dL    KETONES,URINE             Negative Negative mg/dL    BILIRUBIN,URINE           Negative Negative                     OCCULT BLOOD,URINE        Negative Negative                    NITRITE                   Negative Negative                    LEUKOCYTE ESTERASE        Negative Negative                   MICROALBUMIN RANDOM URINE      Result  Value Ref Range    ALB RAND URINE            9.3 mg/L    CREATININE,URINE          1.93 g/L    MICROALBUMIN,RAND UR      4.8 <30.0 mg/g creat   URINALYSIS MICROSCOPIC      Result  Value Ref Range    RBC None Seen 0-2, None Seen /HPF    WBC None Seen 0-2, 3-5, None Seen /HPF    BACTERIA                  None Seen None Seen, Rare, Occasional, Few Bacteria/HPF    EPITHELIAL CELLS          None Seen None Seen, Few Epi/HPF        Results for orders placed or performed in visit on 04/21/17      CBC W PLT NO DIFF      Result  Value Ref Range    WHITE BLOOD COUNT         4.6 4.5 - 11.0 thou/cu mm    RED BLOOD COUNT           6.16 (H) 4.30 - 5.90 mil/cu mm    HEMOGLOBIN                16.5 13.5 - 17.5 g/dL    HEMATOCRIT                50.1 37.0 - 53.0 %    MCV                       81 80 - 100 fL    MCH                       26.7 26.0 - 34.0 pg    MCHC                      32.9 32.0 - 36.0 g/dL    RDW                       12.9 11.5 - 15.5 %    PLATELET COUNT            141 140 - 440 thou/cu mm    MPV                       9.3 6.5 - 11.0 fL   COMP METABOLIC PANEL      Result  Value Ref Range    SODIUM 136 133 - 143 mmol/L    POTASSIUM 4.0 3.5 - 5.1 mmol/L    CHLORIDE 105 98 - 107 mmol/L    CO2,TOTAL 22 21 - 31 mmol/L    ANION GAP 9 5 - 18                    GLUCOSE 303 (H) 70 - 105 mg/dL    CALCIUM 9.3 8.6 - 10.3 mg/dL    BUN 12 7 - 25 mg/dL    CREATININE 0.90 0.70 - 1.30 mg/dL    BUN/CREAT RATIO           13                    GFR if African American >60 >60 ml/min/1.73m2    GFR if not African American >60 >60 ml/min/1.73m2    ALBUMIN 4.0 3.5 - 5.7 g/dL    PROTEIN,TOTAL 6.1 (L) 6.4 - 8.9 g/dL    GLOBULIN                  2.1 2.0 - 3.7 g/dL    A/G RATIO 1.9 1.0 - 2.0                     BILIRUBIN,TOTAL 1.0 0.3 - 1.0 mg/dL    ALK PHOSPHATASE 41 34 - 104 IU/L    ALT (SGPT) 49 7 - 52 IU/L    AST (SGOT) 37 13 - 39 IU/L   HEMOGLOBIN A1C MONITORING (POCT)      Result  Value Ref Range    HEMOGLOBIN A1C MONITORING (POCT) 10.2 (H) 4.0 - 6.2 %    ESTIMATED AVERAGE GLUCOSE  246 mg/dL   LIPID PANEL      Result  Value Ref Range    CHOLESTEROL,TOTAL 235 (H) <200 mg/dL    TRIGLYCERIDES 349 (H) <150 mg/dL    HDL CHOLESTEROL 37 23 - 92 mg/dL    NON-HDL CHOLESTEROL 198 (H) <145 mg/dl    CHOL/HDL RATIO            6.35 (H) <4.50                    LDL CHOLESTEROL 128 (H) <100 mg/dL    PATIENT STATUS            NOT GIVEN                     6/30/2016 -- PROCEDURE:  XR HAND 3 VIEWS LEFT     HISTORY: Bilateral hand pain.         COMPARISON:  08/17/2015     TECHNIQUE:  3 views of the left hand were obtained.     FINDINGS:       Mild degenerative change of the first metacarpal carpal joint. No other abnormalities.      IMPRESSION:      1. Mild degenerative change of the first metacarpal carpal joint.  2. Ovaries negative       Electronically Signed By: Esme Joe M.D. on 6/30/2016 3:56 PM    ASSESSMENT AND PLAN:  Shelton was seen today for diabetes.    Diagnoses and all orders for this visit:    Uncontrolled type 2 diabetes mellitus without complication, without long-term current use of insulin (HC)  -     glipiZIDE (GLUCOTROL) 5 mg tablet; Take 0.5-1 tablets by mouth 3 times daily before meals - New 7/27/2017  -     blood-glucose meter; Dispense item covered by pt ins. E11.65 NIDDM type II, uncontrolled - Test 3 time/day  -     lancets; Dispense item covered by pt ins. E11.65 NIDDM type II, uncontrolled - Test 3 times/day, Reason: High A1C, new start Glipizide    Steroid-induced hyperglycemia  -     CBC W PLT NO DIFF; Standing  -     COMP METABOLIC PANEL; Standing  -     HEMOGLOBIN A1C MONITORING (POCT); Standing  -     URINALYSIS W REFLEX MICROSCOPIC IF POSITIVE; Future  -     MICROALBUMIN RANDOM URINE;  Future  -     AMB CONSULT TO DIABETES EDUCATION; Future  -     CBC W PLT NO DIFF  -     COMP METABOLIC PANEL  -     HEMOGLOBIN A1C MONITORING (POCT)  -     URINALYSIS W REFLEX MICROSCOPIC IF POSITIVE  -     MICROALBUMIN RANDOM URINE  -     URINALYSIS MICROSCOPIC; Future  -     URINALYSIS MICROSCOPIC  -     glipiZIDE (GLUCOTROL) 5 mg tablet; Take 0.5-1 tablets by mouth 3 times daily before meals - New 7/27/2017  -     lancets; Dispense item covered by pt ins. E11.65 NIDDM type II, uncontrolled - Test 3 times/day, Reason: High A1C, new start Glipizide    Anxiety and depression    GWEN on CPAP    Mixed hyperlipidemia  -     LIPID PANEL; Standing  -     atorvastatin (LIPITOR) 40 mg tablet; Take 1 tablet by mouth once daily.  -     LIPID PANEL    Morbid obesity with BMI of 45.0-49.9, adult (HC)    Chronic pain of both knees  -     naproxen (NAPROSYN) 500 mg tablet; Take 1 tablet by mouth 2 times daily with meals. AS Needed for Pain -- Stop Celebrex    Primary osteoarthritis involving multiple joints  -     naproxen (NAPROSYN) 500 mg tablet; Take 1 tablet by mouth 2 times daily with meals. AS Needed for Pain -- Stop Celebrex    Spasm of back muscles  -     methocarbamol (ROBAXIN) 750 mg tablet; Take 1 tablet by mouth 3 times daily. As needed for pain    lab results and schedule of future lab studies reviewed with patient, reviewed diet, exercise and weight control, recommended sodium restriction, cardiovascular risk and specific lipid/LDL goals reviewed, specific diabetic recommendations referral to Diabetic Education department, low cholesterol diet, weight control and daily exercise discussed and home glucose monitoring emphasized    -- Expected clinical course discussed   -- Medications and their side effects discussed    -- Glucose levels are uncontrolled, initiate glipizide.    The 10-year ASCVD risk score (Sidneymagalie COLLADO Jr, et al., 2013) is: 6.5%    Values used to calculate the score:      Age: 46 years      Sex: Male       Is Non- : No      Diabetic: Yes      Tobacco smoker: No      Systolic Blood Pressure: 138 mmHg      Is BP treated: No      HDL Cholesterol: 49 mg/dL      Total Cholesterol: 233 mg/dL    Shelton is also recommended to eat a heart-healthy diet, do regular aerobic exercises, maintain a desirable body weight, and avoid tobacco products. These recommendations are from the American Heart Association (AHA) which stresses the importance of lifestyle changes to lower cardiovascular disease risk.     Return in about 3 months (around 10/27/2017) for -- labs in 91+ days with Diabetes clinic appointment with Dr. Johnston 1-2 days later..    Patient Instructions     Labs today.     Start Aleve -- take twice daily with food.   -- stop Celebrex.     Need to try cut down / stop breads, carbohydrates, pasta.   Reduce sugar intake.     Medications refilled.     -- may need to consider other medications to help with your glucose control.         Return for Diabetes labs and clinic follow-up appointment every 3 to 4 months.  --- (Go for about 91 to 100 days)    Schedule lab only appointment --- A few days AFTER: 10/25/17     Schedule clinic appointment with Dr. Johnston -- Same day as labs, or 1-2 days later.     Insurance companies are now grading you and I on your blood sugar control -- Goal is to get your A1c down to 7.9% or lower and NO Smoking!    -- Medicare and most insurance companies, will only cover Hemoglobin A1c labs to be rechecked every 91+ days.      HEMOGLOBIN A1C MONITORING (POCT) (%)    Date Value   04/21/2017 10.2 (H)        Next follow-up appointment with Dr. Johnston should be scheduled:  -- Approximately a few days AFTER: 10/25/17    Scott Johnston MD

## 2017-12-28 NOTE — TELEPHONE ENCOUNTER
Patient Information     Patient Name MRN Shelton Lopez [id number] Male 1970      Telephone Encounter by Reanna Cedeno at 2017 10:51 AM     Author:  Reanna Cedeno Service:  (none) Author Type:  (none)     Filed:  2017 10:51 AM Encounter Date:  2017 Status:  Signed     :  Reanna Cedeno            Registration staff indicate that they will be taking care of this at this time, referral is on their work que.      Reanna Cedeno LPN        2017 10:51 AM

## 2017-12-28 NOTE — PROGRESS NOTES
Patient Information     Patient Name MRN Shelton Lopez [id number] Male 1970      Progress Notes by Scott Johnston MD at 2017  1:20 PM     Author:  Scott Johnston MD Service:  (none) Author Type:  Physician     Filed:  2017  6:31 PM Encounter Date:  2017 Status:  Signed     :  Scott Johnston MD (Physician)            Nursing Notes:   Reanna Cedeno  2017  1:42 PM  Signed  Patient presents to the clinic for medication management.    Previous A1C is not at goal of <8  HEMOGLOBIN A1C MONITORING (POCT) (%)    Date Value   2017 8.8 (H)     Urine microalbumin:creatine: 1.93  Foot exam unknown  Eye exam 2017    Patient is not a current smoker  Patient is on a daily aspirin  Patient is on a Statin.  Blood pressure today of   is at the goal of <139/89 for diabetics.    Reanna NOVAK Wilian LPN..............2017 1:21 PM   Shelton Puetnes presents to clinic today for:   Chief Complaint    Patient presents with      Diabetes     HPI: Mr. Puentes is a 46 y.o. male who presents today for evaluation of above.     (E11.65) Uncontrolled type 2 diabetes mellitus without complication, without long-term current use of insulin (HC)  (primary encounter diagnosis)  (G47.33,  Z99.89) GWEN on CPAP  (M18.0) Arthritis of carpometacarpal (CMC) joints of both thumbs  (E55.9) Vitamin D deficiency  (R73.9) Steroid-induced hyperglycemia  (F41.9,  F32.9) Anxiety and depression     Patient has uncontrolled type 2 diabetes.  Currently managing his blood sugars with a combination of glipizide and once weekly tanzeum injections.  [place], and since these need to be refrigerated, this will not work.  Advised that he start dose increasing his glipizide to accommodate for the reduction/discontinuation of tanzeum.  New prescription sent to pharmacy for dosing increase of glipizide.  HEMOGLOBIN A1C MONITORING (POCT) (%)     Date Value   07/27/2017 8.8 (H)     blood sugar is likely a quite high due to recent, frequent steroid injections into his basilar/CMC thumb joints.  Reports that he hardly got 2 months of relief from the previous couple steroid injections.  Orthopedic surgery was seen down in [place] they recommend proceeding with either a thumb fusion versus other surgery.  He opted for the other surgery and is looking at having this done in spring 2018.    -- Ibuprofen, Aleve, Advil or all intermittently helpful but minimally at times.  He would like to try something to help with pain on a more significant basis.  States bilateral thumb pain currently 8 out of 10.  He has had some problems with other medications in the past.  Start trial of Tylenol with codeine.  Tramadol appears to have some pretty significant drug interactions with his current medication regimen.    Sleep apnea, still using CPAP regularly.  Finds it quite helpful.  Advised to continue using daily.    CMC joint arthritis, see above.  Trying to manage with thumb splints and nonsteroid treatment.    Vitamin D deficiency, ongoing.  Needs refills of his vitamin D.    Anxiety and depression, currently on Effexor.  He would like to get off this.  He thinks this is contributing to his weight gain.  He like to try different medication.  We offered Wellbutrin as an option he would like to proceed with this.  New prescription sent to pharmacy with instructions to taper off Effexor.      Mr. Fishs Body mass index is 48.32 kg/(m^2). This is out of the normal range for a 46 y.o. Normal range for ages 18+ is between 18.5 and 24.9. To lose weight we reviewed risks and benefits of appropriate options such as diet, exercise, and medications. Patient's strategy will be  none; patient is not ready to act   BP Readings from Last 1 Encounters:09/08/17 : 140/78  Mr. Reynolds blood pressure is out of the normal range for adults. Per JNC-8 guidelines normal adult blood  pressure is < 120/80, pre-hypertensive is between 120/80 and 139/89, and hypertension is 140/90 or greater. Risks of hypertension were discussed. Patient's strategy will be reduced salt intake    Functional Capacity: > 4 METS.   Reports that he can climb a flight of stairs without any chest pain/heaviness or shortness of breath.   Patient reports no current symptoms of fevers, chills, nausea/vomiting.   No cough. No shortness of breath.   No change in bowel/bladder habits. No melena, hematochezia. No Hematuria.   No rashes. No palpitations.  No orthopnea/paroxysmal nocturnal dyspnea   No vision or hearing issues.  + Chronic anxiety   No bruising.     ASHVIN:  ASHVIN-7 ANXIETY SCREENING 4/21/2017 7/27/2017   ASHVIN date (doc flow) 4/21/2017 7/27/2017   Nervous, anxious 0 0   Cannot stop worrying 0 0   Worry about different things 0 0   Cannot relax 0 0   Feeling restless 0 0   Easily annoyed/irritated 0 0   Afraid of awful event 0 0   Score 0 0   Severity none none   Some recent data might be hidden         PHQ9:  PHQ Depression Screening 7/27/2017 9/8/2017   Date of PHQ exam (doc flow) 7/27/2017 9/8/2017   1. Lack of interest/pleasure 0 - Not at all 0 - Not at all   2. Feeling down/depressed 0 - Not at all 0 - Not at all   PHQ-2 TOTAL SCORE 0 0   3. Trouble sleeping 0 - Not at all 0 - Not at all   4. Decreased energy 0 - Not at all 0 - Not at all   5. Appetite change 0 - Not at all 0 - Not at all   6. Feelings of failure 0 - Not at all 0 - Not at all   7. Trouble concentrating 0 - Not at all 0 - Not at all   8. Activity level 0 - Not at all 0 - Not at all   9. Hurting yourself 0 - Not at all 0 - Not at all   PHQ-9 TOTAL SCORE 0 0   PHQ-9 Severity Level none none   Functional Impairment not difficult at all not difficult at all   Some recent data might be hidden          I have personally reviewed the past medical history, past surgical history, medications, allergies, family and social history as listed below, on  9/8/2017.    Patient Active Problem List       Diagnosis  Date Noted     Arthritis of carpometacarpal (CMC) joints of both thumbs  08/17/2017     Uncontrolled type 2 diabetes mellitus without complication, without long-term current use of insulin (HC)  07/27/2017     Lumbar spinal stenosis  05/01/2017     Steroid-induced hyperglycemia  11/01/2016     Microcytic red blood cells  11/01/2016     Anxiety and depression  11/01/2016     GWEN on CPAP  11/01/2016     Mixed hyperlipidemia  11/01/2016     Morbid obesity with BMI of 45.0-49.9, adult (HC)  11/01/2016     Chronic pain of both knees  11/01/2016     Chronic midline low back pain with left-sided sciatica  11/01/2016     Cyst of left kidney  11/01/2016     Achilles tendonitis, bilateral  07/22/2016     Health care maintenance  07/01/2016     Colonoscopy: Age 50  ITALO/PSA: Given family history of early prostate disease, would check periodically to monitor for changes; last in 08/2015, recheck in 1-3 years  AAA screen: Not indicated  Immunizations: UTD on other vaccines.   Lipids/Annual Exam: Done 07/2016, repeat as needed for cholesterol management  Hepatitis C screen: not indicated                Vitamin D deficiency  09/23/2015     Family hx of prostate cancer  08/17/2015     Osteoarthritis of multiple joints  03/05/2015     Past Medical History:     Diagnosis  Date     Achilles tendonitis, bilateral 7/22/2016     Chronic back pain      Depression with anxiety      Obstructive sleep apnea     using nasal pillows; sleeping 8 hours      Osteoarthritis of multiple joints     has had injections       Past Surgical History:      Procedure  Laterality Date     TONSILLECTOMY  as a child     Current Outpatient Prescriptions       Medication  Sig Dispense Refill     acetaminophen SR (TYLENOL 8 HOUR) 650 mg Extended-Release tablet Take 1 tablet by mouth up to 4 times daily as needed for pain 100 tablet 3     acetaminophen-codeine, 300-30 mg, (TYLENOL #3) tablet Take 1 tablet  "by mouth every 4 hours if needed (- no driving after use). Max acetaminophen dose: 4000mg in 24 hrs. 30 tablet 0     albiglutide (TANZEUM) 50 mg/0.5 mL injection Inject 50 mg subcutaneous once weekly. - Start 05/19/17 - after completion of 30 mg pen 4 Each 11     blood sugar diagnostic (ACCU-CHEK SMARTVIEW TEST STRIP) strip Dispense item covered by pt ins. E11.9 NIDDM type II - Test 3 times/day, Reason: High A1C 300 Strip 3     blood-glucose meter Dispense item covered by pt ins. E11.65 NIDDM type II, uncontrolled - Test 3 time/day 1 Device 0     buPROPion (WELLBUTRIN SR) 150 mg Sustained-Release tablet Take 1 tablet by mouth 2 times daily. -- start with 1 daily x1 week -- then increase to twice daily - Stop Effexor 180 tablet 3     cholecalciferol (VITAMIN D3) 5,000 unit capsule Take 1 capsule by mouth once daily. For Vitamin D Deficiency 100 capsule 3     glipiZIDE (GLUCOTROL) 5 mg tablet Take 1-2 tablets by mouth 3 times daily before meals - Dose increase 9/8/2017 - Stop Tanzeum at this time 180 tablet 3     HAND SUPPORT misc As directed. Arthritis glove, bilateral. Wear on both hands as needed for pain. Dx: [facility] arthritis, 716.94 (M19.90). 2 Each 0     Insulin Needles, Disposable, (KO PEN NEEDLE) 32 gauge x 5/32\" As directed. For administering Tanzeum at home. Dx: E11.65 30 Each 3     lancets Dispense item covered by pt ins. E11.65 NIDDM type II, uncontrolled - Test 3 times/day, Reason: High A1C, new start Glipizide 100 Each 11     lancets Dispense item covered by pt ins. E11.65 NIDDM type II, uncontrolled - Test 2 times/day 200 Each 3     methocarbamol (ROBAXIN) 750 mg tablet Take 1 tablet by mouth 3 times daily. As needed for pain 90 tablet 11     venlafaxine (EFFEXOR) 37.5 mg tablet Take 1 tablet by mouth every morning. Drop to 75 mg daily x3 days, then 37.5 mg x3 days, then 1/2 tablet daily x4 days - wean off Effexor 30 tablet 0     Allergies      Allergen   Reactions     Metformin  Other - Describe In " "Comment Field     Felt very foggy / groggy after taking, when in combination with Robaxin.       Family History      Problem  Relation Age of Onset     Psychiatric illness Father      Psychiatric illness Paternal Grandfather      Psychiatric illness Paternal Uncle      Family Status     Relation  Status     Father Alive    prostate cancer 60's      Mother Alive     Brother Alive    brain tumor age 15; recurrent issues with this      Paternal Grandfather      Paternal Uncle      Social History     Social History        Marital status:       Spouse name: N/A     Number of children:  N/A     Years of education:  N/A     Social History Main Topics          Smoking status:   Never Smoker      Smokeless tobacco:   Never Used      Alcohol use   0.0 oz/week     0 Standard drinks or equivalent per week        Comment: rarely       Drug use:   No      Sexual activity:   Yes      Partners:  Female      Other Topics  Concern     Not on file      Social History Narrative     Originally from [place]; moved back to [facility] in 2012 after living there for another 3-4 years with wife; , no children.  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  Going to school in [place] for [employer] certification.         Pertinent ROS was performed and was negative as noted in HPI above.     EXAM:   Vitals:     09/08/17 1325   BP: 140/78   Pulse: 78   Weight: (!) 163.9 kg (361 lb 4 oz)     BP Readings from Last 3 Encounters:    09/08/17 140/78   07/27/17 138/74   04/21/17 134/86     Wt Readings from Last 3 Encounters:    09/08/17 (!) 163.9 kg (361 lb 4 oz)   07/27/17 (!) 162.4 kg (358 lb)   04/21/17 (!) 165.3 kg (364 lb 8 oz)     Estimated body mass index is 48.32 kg/(m^2) as calculated from the following:    Height as of 4/21/17: 1.842 m (6' 0.5\").    Weight as of this encounter: 163.9 kg (361 lb 4 oz).     EXAM:  Constitutional: well groomed / good hygiene, casual dress  Eyes:  Extraocular muscles intact, Sclera non-icteric, " Conjunctiva without erythema  Lymphatic Exam: Non-palpable nodes in neck, clavicular regions  Pulmonary: Lungs are clear to auscultation bilaterally, without wheezes or crackles  Cardiovascular Exam: regular rate and rhythm, 1+ pedal edema present  Gastrointestinal Exam: Obese  Integument: No abnormal rashes, sores, or ulcerations noted  Neurologic Exam: CN 3-12 grossly intact   Musculoskeletal Exam: wearing bilateral hand / thumb braces.   Psychiatric Exam: Awake and Alert, Affect and mood appropriate    INVESTIGATIONS:  Results for orders placed or performed in visit on 07/27/17      CBC W PLT NO DIFF      Result  Value Ref Range    WHITE BLOOD COUNT         6.8 4.5 - 11.0 thou/cu mm    RED BLOOD COUNT           6.28 (H) 4.30 - 5.90 mil/cu mm    HEMOGLOBIN                16.8 13.5 - 17.5 g/dL    HEMATOCRIT                50.5 37.0 - 53.0 %    MCV                       80 80 - 100 fL    MCH                       26.8 26.0 - 34.0 pg    MCHC                      33.3 32.0 - 36.0 g/dL    RDW                       14.2 11.5 - 15.5 %    PLATELET COUNT            152 140 - 440 thou/cu mm    MPV                       10.9 6.5 - 11.0 fL   COMP METABOLIC PANEL      Result  Value Ref Range    SODIUM 139 133 - 143 mmol/L    POTASSIUM 4.3 3.5 - 5.1 mmol/L    CHLORIDE 102 98 - 107 mmol/L    CO2,TOTAL 25 21 - 31 mmol/L    ANION GAP 12 5 - 18                    GLUCOSE 178 (H) 70 - 105 mg/dL    CALCIUM 9.6 8.6 - 10.3 mg/dL    BUN 15 7 - 25 mg/dL    CREATININE 0.86 0.70 - 1.30 mg/dL    BUN/CREAT RATIO           17                    GFR if African American >60 >60 ml/min/1.73m2    GFR if not African American >60 >60 ml/min/1.73m2    ALBUMIN 4.2 3.5 - 5.7 g/dL    PROTEIN,TOTAL 7.1 6.4 - 8.9 g/dL    GLOBULIN                  2.9 2.0 - 3.7 g/dL    A/G RATIO 1.4 1.0 - 2.0                    BILIRUBIN,TOTAL 1.1 (H) 0.3 - 1.0 mg/dL    ALK PHOSPHATASE 34 34 - 104 IU/L    ALT (SGPT) 35 7 - 52 IU/L    AST (SGOT) 23 13 - 39 IU/L    HEMOGLOBIN A1C MONITORING (POCT)      Result  Value Ref Range    HEMOGLOBIN A1C MONITORING (POCT) 8.8 (H) 4.0 - 6.2 %    ESTIMATED AVERAGE GLUCOSE  206 mg/dL   LIPID PANEL      Result  Value Ref Range    CHOLESTEROL,TOTAL 233 (H) <200 mg/dL    TRIGLYCERIDES 194 (H) <150 mg/dL    HDL CHOLESTEROL 49 23 - 92 mg/dL    NON-HDL CHOLESTEROL 184 (H) <145 mg/dl    CHOL/HDL RATIO            4.76 (H) <4.50                    LDL CHOLESTEROL 145 (H) <100 mg/dL    PATIENT STATUS            NON-FASTING                   URINALYSIS W REFLEX MICROSCOPIC IF POSITIVE      Result  Value Ref Range    COLOR                     Yellow Yellow Color    CLARITY                   Clear Clear Clarity    SPECIFIC GRAVITY,URINE    1.025 1.010, 1.015, 1.020, 1.025                    PH,URINE                  5.5 6.0, 7.0, 8.0, 5.5, 6.5, 7.5, 8.5                    UROBILINOGEN,QUALITATIVE  Normal Normal EU/dl    PROTEIN, URINE Negative Negative mg/dL    GLUCOSE, URINE 100 (A) Negative mg/dL    KETONES,URINE             Negative Negative mg/dL    BILIRUBIN,URINE           Negative Negative                    OCCULT BLOOD,URINE        Negative Negative                    NITRITE                   Negative Negative                    LEUKOCYTE ESTERASE        Negative Negative                   MICROALBUMIN RANDOM URINE      Result  Value Ref Range    ALB RAND URINE            9.3 mg/L    CREATININE,URINE          1.93 g/L    MICROALBUMIN,RAND UR      4.8 <30.0 mg/g creat   URINALYSIS MICROSCOPIC      Result  Value Ref Range    RBC None Seen 0-2, None Seen /HPF    WBC None Seen 0-2, 3-5, None Seen /HPF    BACTERIA                  None Seen None Seen, Rare, Occasional, Few Bacteria/HPF    EPITHELIAL CELLS          None Seen None Seen, Few Epi/HPF       ASSESSMENT AND PLAN:  Shelton was seen today for diabetes.    Diagnoses and all orders for this visit:    Uncontrolled type 2 diabetes mellitus without complication, without long-term current  use of insulin (HC)  -     glipiZIDE (GLUCOTROL) 5 mg tablet; Take 1-2 tablets by mouth 3 times daily before meals - Dose increase 9/8/2017 - Stop Tanzeum at this time    GWEN on CPAP    Arthritis of carpometacarpal (CMC) joints of both thumbs  -     acetaminophen-codeine, 300-30 mg, (TYLENOL #3) tablet; Take 1 tablet by mouth every 4 hours if needed (- no driving after use). Max acetaminophen dose: 4000mg in 24 hrs.    Vitamin D deficiency  -     cholecalciferol (VITAMIN D3) 5,000 unit capsule; Take 1 capsule by mouth once daily. For Vitamin D Deficiency    Steroid-induced hyperglycemia  -     glipiZIDE (GLUCOTROL) 5 mg tablet; Take 1-2 tablets by mouth 3 times daily before meals - Dose increase 9/8/2017 - Stop Tanzeum at this time    Anxiety and depression  -     buPROPion (WELLBUTRIN SR) 150 mg Sustained-Release tablet; Take 1 tablet by mouth 2 times daily. -- start with 1 daily x1 week -- then increase to twice daily - Stop Effexor  -     venlafaxine (EFFEXOR) 37.5 mg tablet; Take 1 tablet by mouth every morning. Drop to 75 mg daily x3 days, then 37.5 mg x3 days, then 1/2 tablet daily x4 days - wean off Effexor    lab results and schedule of future lab studies reviewed with patient, reviewed diet, exercise and weight control, recommended sodium restriction, cardiovascular risk and specific lipid/LDL goals reviewed    -- Expected clinical course discussed   -- Medications and their side effects discussed    The 10-year ASCVD risk score (Liam TOBIAS Jr, et al., 2013) is: 6.6%    Values used to calculate the score:      Age: 46 years      Sex: Male      Is Non- : No      Diabetic: Yes      Tobacco smoker: No      Systolic Blood Pressure: 140 mmHg      Is BP treated: No      HDL Cholesterol: 49 mg/dL      Total Cholesterol: 233 mg/dL    Emorystephanieevaristo is also recommended to eat a heart-healthy diet, do regular aerobic exercises, maintain a desirable body weight, and avoid tobacco products. These  recommendations are from the American Heart Association (AHA) which stresses the importance of lifestyle changes to lower cardiovascular disease risk.     Return in about 7 months (around 4/8/2018) for - follow-up anxiety, diabetes.    Patient Instructions   1. Uncontrolled type 2 diabetes mellitus without complication, without long-term current use of insulin (HC)  - glipiZIDE (GLUCOTROL) 5 mg tablet; Take 1-2 tablets by mouth 3 times daily before meals - Dose increase 9/8/2017 - Stop Tanzeum at this time  Dispense: 180 tablet; Refill: 3    2. GWEN on CPAP    3. Arthritis of carpometacarpal (CMC) joints of both thumbs    4. Vitamin D deficiency  - cholecalciferol (VITAMIN D3) 5,000 unit capsule; Take 1 capsule by mouth once daily. For Vitamin D Deficiency  Dispense: 100 capsule; Refill: 3    5. Steroid-induced hyperglycemia  - glipiZIDE (GLUCOTROL) 5 mg tablet; Take 1-2 tablets by mouth 3 times daily before meals - Dose increase 9/8/2017 - Stop Tanzeum at this time  Dispense: 180 tablet; Refill: 3    6. Anxiety and depression  - buPROPion (WELLBUTRIN SR) 150 mg Sustained-Release tablet; Take 1 tablet by mouth 2 times daily. -- start with 1 daily x1 week -- then increase to twice daily - Stop Effexor  Dispense: 180 tablet; Refill: 3    - venlafaxine (EFFEXOR) 37.5 mg tablet; Take 1 tablet by mouth every morning. Drop to 75 mg daily x3 days, then 37.5 mg x3 days, then 1/2 tablet daily x4 days - wean off Effexor        --> Trial of Tylenol #3 -- for your arthritis pain.      Return in approximately 7 month(s), or sooner as needed for follow-up with Dr. Johnston.  - follow-up anxiety, diabetes    Clinic : [phone]  Appointment line: [phone]      Scott Johnston MD

## 2017-12-28 NOTE — TELEPHONE ENCOUNTER
Patient Information     Patient Name MRN Sex Shelton Benson [id number] Male 1970      Telephone Encounter by Clarisse Reyes at 2017  9:24 AM     Author:  Clarisse Reyes Service:  (none) Author Type:  (none)     Filed:  2017  9:26 AM Encounter Date:  2017 Status:  Signed     :  Clarisse Reyes            Spoke with patient and his wife, patient says he is now feeling less jittery the past 2 days, and his demeanor and mood swings are better.  He is wondering how long he can be on the Clonidine which he is taking the max prn dose of this. He also is wondering if at some point he can increase his dose of the Bupropion. Please advise. Should he schedule a F/U apt. With you? Thanks. Clarisse Reyes LPN......................2017 9:26 AM

## 2017-12-28 NOTE — TELEPHONE ENCOUNTER
Patient Information     Patient Name MRN Sex Shelton Benson [id number] Male 1970      Telephone Encounter by Clarisse Reyes at 2017  9:59 AM     Author:  Clarisse Reyes Service:  (none) Author Type:  (none)     Filed:  2017 10:04 AM Encounter Date:  2017 Status:  Signed     :  Clarisse Reyes            Spoke with patient's wife, wanting to verify that the MRI orders and xray orders are placed so they can get this done before next Tuesday when he has his apt. In the [employer]. It looks like Xray orders are complete but not the MRI.  Clarisse Reyes LPN......................2017 10:00 AM

## 2017-12-28 NOTE — TELEPHONE ENCOUNTER
Patient Information     Patient Name MRN Sex Shelton Benson [id number] Male 1970      Telephone Encounter by Clarisse Reyes at 2017 10:21 AM     Author:  Clarisse Reyes Service:  (none) Author Type:  (none)     Filed:  2017 10:24 AM Encounter Date:  2017 Status:  Signed     :  Clarisse Reyes            Spoke with patient's wife, she said they are leaving for [place] in 10 days and won't be back until March, should he schedule  A F/U before they leave? Also she is wanting an apt. For her Kenalog shot also now. Please advise. Clarisse Reyes LPN......................2017 10:24 AM

## 2017-12-28 NOTE — TELEPHONE ENCOUNTER
Patient Information     Patient Name MRN Sex     Shelton Puentes [id number] Male 1970      Telephone Encounter by Maddi Willingham at 2017  1:38 PM     Author:  Maddi Willingham Service:  (none) Author Type:  (none)     Filed:  2017  1:40 PM Encounter Date:  2017 Status:  Signed     :  Maddi Willingham            Per patient's spouse - Referral for Ortho should be for Dr. Macedo @ [facility] for injection - not Dr. Baugh  Please issue new referral to [facility] for injection in thumb. Patient states he has already seen Dr. Baugh.  Will close the referral for Dr. Baugh.   Thank you.

## 2017-12-28 NOTE — TELEPHONE ENCOUNTER
Patient Information     Patient Name MRN Sex Shelton Benson [id number] Male 1970      Telephone Encounter by Scott Johnston MD at 2017  1:31 PM     Author:  Scott Johnston MD Service:  (none) Author Type:  Physician     Filed:  2017  1:32 PM Encounter Date:  2017 Status:  Signed     :  Scott Johnston MD (Physician)            Was not supposed to stop the Tanzium... Should take with the glipizide.    Diabetes is uncontrolled.    HEMOGLOBIN A1C MONITORING (POCT) (%)    Date Value   2017 8.8 (H)      cSott Johnston MD

## 2017-12-28 NOTE — PROGRESS NOTES
Patient Information     Patient Name MRN Sex Shelton Benson [id number] Male 1970      Progress Notes by Scott Johnston MD at 10/4/2017 12:40 PM     Author:  Scott Johnston MD Service:  (none) Author Type:  Physician     Filed:  10/4/2017 10:19 PM Encounter Date:  10/4/2017 Status:  Signed     :  Scott Johnston MD (Physician)            Nursing Notes:   WilianReanna  10/4/2017  1:03 PM  Signed  Previous A1C is not at goal of <8  HEMOGLOBIN A1C MONITORING (POCT) (%)    Date Value   2017 8.8 (H)     Urine microalbumin:creatine: 1.93  Foot exam unknown  Eye exam 2017  Patient is not a current smoker  Patient is on a daily aspirin  Patient is on a Statin.  Blood pressure today of  is at the goal of <139/89 for diabetics.    Reanna Cedeno LPN..............10/4/2017 12:41 PM    Shelton Puentes presents to clinic today for:   Chief Complaint    Patient presents with      Medication Management     HPI: Mr. Puentes is a 46 y.o. male who presents today for evaluation of above.     (M18.0) Arthritis of carpometacarpal (CMC) joints of both thumbs  (primary encounter diagnosis)  (M15.0) Primary osteoarthritis involving multiple joints  (F41.8) Anxiety and depression  (Z23) Needs flu shot     Patient presents for follow-up of a number of issues.    Bilateral hand CMC joint arthritis, he has tried steroid injections which did help but he is to the point where they were really messing with his blood sugars.  His blood sugars are from a coming into more normal range now.  He has been taking anti-inflammatories such as Celebrex or ibuprofen/Aleve.  He went to the hand surgeon and subsequently went to occupational therapy.  They got him some hand splints which do seem to help some but he states that his thumbs are becoming much more stiff because her not moving very much.  He continues to have significant pain.  He still wants to try working.  He is actually hoping to try  leaving for [place] next Tuesday, he is wondering if there are any treatment options to help his hand pain.  He tried some Tylenol with codeine No. 3 with minimal improvement.  He actually tried a couple of his wife's tramadol switch initially thought were helpful but then he stopped them because they were not helping.  Advised the tramadol is a bad idea given the multiple drug interactions with his other medications.  Advised patient that steroid injections really with this mass of his blood sugars there are no medications orally that will really do much for him and he probably is at the point where he needs to have hand surgery.    He is hopeful a trial of gabapentin as well as some Tylenol No. 4 may provide some benefit.  He again is still hopeful to go to [place] next week and potentially push offs hand surgery until the spring.  Advised that he call in next couple days with update on his symptoms and any improvements he may have had from the new medications.    Multiple joint arthritis, see above.  Similar concerns and issues.    Anxiety and depression.  States that he has been having almost aggression and anger as well as periods of whole body sensations of tingling or burning.  States that his mind is much more clear since now being off Effexor, he did have some pretty significant withdrawal symptoms from that.  These were improved with clonidine.  He's been off Effexor now for 3 weeks, advised that he stop clonidine.  + Still having some depression issues, increased dose of Wellbutrin.  + Consider adding BuSpar.  + Start trial of gabapentin, Tylenol #4    Flu shot today, orders placed.    Mr. Puentes's Body mass index is 48.71 kg/(m^2). This is out of the normal range for a 46 y.o. Normal range for ages 18+ is between 18.5 and 24.9. To lose weight we reviewed risks and benefits of appropriate options such as diet, exercise, and medications. Patient's strategy will be  self-directed nutrition plan and  self-directed exercise program   BP Readings from Last 1 Encounters:10/04/17 : 148/86  Mr. Puentes's blood pressure is out of the normal range for adults. Per JNC-8 guidelines normal adult blood pressure is < 120/80, pre-hypertensive is between 120/80 and 139/89, and hypertension is 140/90 or greater. Risks of hypertension were discussed. Patient's strategy will be weight loss, increased activity and reduced salt intake    Functional Capacity: > 4 METS.   Reports that he can climb a flight of stairs without any chest pain/heaviness or shortness of breath.   Patient reports no current symptoms of fevers, chills, nausea/vomiting.   No cough. No shortness of breath.   No change in bowel/bladder habits. No melena, hematochezia. No Hematuria.   No rashes. No palpitations.  No orthopnea/paroxysmal nocturnal dyspnea   No vision or hearing issues.   No bruising.     ASHVIN:  ASHVIN-7 ANXIETY SCREENING 7/27/2017 10/4/2017   ASHVIN date (doc flow) 7/27/2017 10/4/2017   Nervous, anxious 0 0   Cannot stop worrying 0 0   Worry about different things 0 0   Cannot relax 0 0   Feeling restless 0 0   Easily annoyed/irritated 0 0   Afraid of awful event 0 0   Score 0 0   Severity none none   Some recent data might be hidden         PHQ9:  PHQ Depression Screening 9/8/2017 10/4/2017   Date of PHQ exam (doc flow) 9/8/2017 10/4/2017   1. Lack of interest/pleasure 0 - Not at all 0 - Not at all   2. Feeling down/depressed 0 - Not at all 0 - Not at all   PHQ-2 TOTAL SCORE 0 0   3. Trouble sleeping 0 - Not at all 0 - Not at all   4. Decreased energy 0 - Not at all 0 - Not at all   5. Appetite change 0 - Not at all 0 - Not at all   6. Feelings of failure 0 - Not at all 0 - Not at all   7. Trouble concentrating 0 - Not at all 0 - Not at all   8. Activity level 0 - Not at all 0 - Not at all   9. Hurting yourself 0 - Not at all 0 - Not at all   PHQ-9 TOTAL SCORE 0 0   PHQ-9 Severity Level none none   Functional Impairment not difficult at all not  difficult at all   Some recent data might be hidden          I have personally reviewed the past medical history, past surgical history, medications, allergies, family and social history as listed below, on 10/4/2017.    Patient Active Problem List       Diagnosis  Date Noted     Arthritis of carpometacarpal (CMC) joints of both thumbs  08/17/2017     Uncontrolled type 2 diabetes mellitus without complication, without long-term current use of insulin (HC)  07/27/2017     Lumbar spinal stenosis  05/01/2017     Steroid-induced hyperglycemia  11/01/2016     Microcytic red blood cells  11/01/2016     Anxiety and depression  11/01/2016     GWEN on CPAP  11/01/2016     Mixed hyperlipidemia  11/01/2016     Morbid obesity with BMI of 45.0-49.9, adult (HC)  11/01/2016     Chronic pain of both knees  11/01/2016     Chronic midline low back pain with left-sided sciatica  11/01/2016     Cyst of left kidney  11/01/2016     Achilles tendonitis, bilateral  07/22/2016     Health care maintenance  07/01/2016     Colonoscopy: Age 50  ITALO/PSA: Given family history of early prostate disease, would check periodically to monitor for changes; last in 08/2015, recheck in 1-3 years  AAA screen: Not indicated  Immunizations: UTD on other vaccines.   Lipids/Annual Exam: Done 07/2016, repeat as needed for cholesterol management  Hepatitis C screen: not indicated                Vitamin D deficiency  09/23/2015     Family hx of prostate cancer  08/17/2015     Osteoarthritis of multiple joints  03/05/2015     Past Medical History:     Diagnosis  Date     Achilles tendonitis, bilateral 7/22/2016     Chronic back pain      Depression with anxiety      Obstructive sleep apnea     using nasal pillows; sleeping 8 hours      Osteoarthritis of multiple joints     has had injections       Past Surgical History:      Procedure  Laterality Date     TONSILLECTOMY  as a child     Current Outpatient Prescriptions       Medication  Sig Dispense Refill      "acetaminophen-codeine, 300-60 mg, (TYLENOL #4) 300-60 mg tablet Take 1 tablet by mouth every 4 hours if needed (for SEVERE Pain - Limit use - no driving after use). 10 tablet 0     blood sugar diagnostic (ACCU-CHEK SMARTVIEW TEST STRIP) strip Dispense item covered by pt ins. E11.9 NIDDM type II - Test 3 times/day, Reason: High A1C 300 Strip 3     blood-glucose meter Dispense item covered by pt ins. E11.65 NIDDM type II, uncontrolled - Test 3 time/day 1 Device 0     buPROPion (WELLBUTRIN SR) 150 mg Sustained-Release tablet Take 2 tablets by mouth 2 times daily. 180 tablet 3     gabapentin (NEURONTIN) 100 mg capsule Take 1-2 capsules by mouth 3 times daily if needed (- for burning/shooting nerve pain). 60 capsule 0     glipiZIDE (GLUCOTROL) 5 mg tablet Take 1-2 tablets by mouth 3 times daily before meals - Dose increase 9/8/2017 - Stop Tanzeum at this time 180 tablet 3     HAND SUPPORT misc As directed. Arthritis glove, bilateral. Wear on both hands as needed for pain. Dx: [facility] arthritis, 716.94 (M19.90). 2 Each 0     Insulin Needles, Disposable, (KO PEN NEEDLE) 32 gauge x 5/32\" As directed. For administering Tanzeum at home. Dx: E11.65 30 Each 3     lancets Dispense item covered by pt ins. E11.65 NIDDM type II, uncontrolled - Test 3 times/day, Reason: High A1C, new start Glipizide 100 Each 11     lancets Dispense item covered by pt ins. E11.65 NIDDM type II, uncontrolled - Test 2 times/day 200 Each 3     methocarbamol (ROBAXIN) 750 mg tablet Take 1 tablet by mouth 3 times daily. As needed for pain 90 tablet 11     Allergies      Allergen   Reactions     Metformin  Other - Describe In Comment Field     Felt very foggy / groggy after taking, when in combination with Robaxin.       Family History      Problem  Relation Age of Onset     Psychiatric illness Father      Psychiatric illness Paternal Grandfather      Psychiatric illness Paternal Uncle      Family Status     Relation  Status     Father Alive    prostate " "cancer 60's      Mother Alive     Brother Alive    brain tumor age 15; recurrent issues with this      Paternal Grandfather      Paternal Uncle      Social History     Social History        Marital status:       Spouse name: N/A     Number of children:  N/A     Years of education:  N/A     Social History Main Topics          Smoking status:   Never Smoker      Smokeless tobacco:   Never Used      Alcohol use   0.0 oz/week     0 Standard drinks or equivalent per week        Comment: rarely       Drug use:   No      Sexual activity:   Yes      Partners:  Female      Other Topics  Concern     Not on file      Social History Narrative     Originally from [place]; moved back to [facility] in 2012 after living there for another 3-4 years with wife; , no children.  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  Going to school in [place] for [employer] certification.           Pertinent ROS was performed and was negative as noted in HPI above.     EXAM:   Vitals:     10/04/17 1247   BP: 148/86   Pulse: 88   Weight: (!) 165.2 kg (364 lb 2 oz)     BP Readings from Last 3 Encounters:    10/04/17 148/86   09/08/17 140/78   07/27/17 138/74     Wt Readings from Last 3 Encounters:    10/04/17 (!) 165.2 kg (364 lb 2 oz)   09/08/17 (!) 163.9 kg (361 lb 4 oz)   07/27/17 (!) 162.4 kg (358 lb)     Estimated body mass index is 48.71 kg/(m^2) as calculated from the following:    Height as of 4/21/17: 1.842 m (6' 0.5\").    Weight as of this encounter: 165.2 kg (364 lb 2 oz).     EXAM:  Constitutional: well groomed / good hygiene, casual dress  Eyes:  Extraocular muscles intact, Sclera non-icteric, Conjunctiva without erythema  Lymphatic Exam: Non-palpable nodes in neck, clavicular regions  Pulmonary: Lungs are clear to auscultation bilaterally, without wheezes or crackles  Cardiovascular Exam: regular rate and rhythm  Gastrointestinal Exam: Obese  Integument: No abnormal rashes, sores, or ulcerations noted  Neurologic Exam: CN " 3-12 grossly intact   Musculoskeletal Exam: Tenderness to palpation of her bilateral CMC joint, bilateral thumb.  Psychiatric Exam: Awake and Alert, Affect and mood appropriate  Speech is fluent, Thought process is normal    INVESTIGATIONS:  Results for orders placed or performed in visit on 07/27/17      CBC W PLT NO DIFF      Result  Value Ref Range    WHITE BLOOD COUNT         6.8 4.5 - 11.0 thou/cu mm    RED BLOOD COUNT           6.28 (H) 4.30 - 5.90 mil/cu mm    HEMOGLOBIN                16.8 13.5 - 17.5 g/dL    HEMATOCRIT                50.5 37.0 - 53.0 %    MCV                       80 80 - 100 fL    MCH                       26.8 26.0 - 34.0 pg    MCHC                      33.3 32.0 - 36.0 g/dL    RDW                       14.2 11.5 - 15.5 %    PLATELET COUNT            152 140 - 440 thou/cu mm    MPV                       10.9 6.5 - 11.0 fL   COMP METABOLIC PANEL      Result  Value Ref Range    SODIUM 139 133 - 143 mmol/L    POTASSIUM 4.3 3.5 - 5.1 mmol/L    CHLORIDE 102 98 - 107 mmol/L    CO2,TOTAL 25 21 - 31 mmol/L    ANION GAP 12 5 - 18                    GLUCOSE 178 (H) 70 - 105 mg/dL    CALCIUM 9.6 8.6 - 10.3 mg/dL    BUN 15 7 - 25 mg/dL    CREATININE 0.86 0.70 - 1.30 mg/dL    BUN/CREAT RATIO           17                    GFR if African American >60 >60 ml/min/1.73m2    GFR if not African American >60 >60 ml/min/1.73m2    ALBUMIN 4.2 3.5 - 5.7 g/dL    PROTEIN,TOTAL 7.1 6.4 - 8.9 g/dL    GLOBULIN                  2.9 2.0 - 3.7 g/dL    A/G RATIO 1.4 1.0 - 2.0                    BILIRUBIN,TOTAL 1.1 (H) 0.3 - 1.0 mg/dL    ALK PHOSPHATASE 34 34 - 104 IU/L    ALT (SGPT) 35 7 - 52 IU/L    AST (SGOT) 23 13 - 39 IU/L   HEMOGLOBIN A1C MONITORING (POCT)      Result  Value Ref Range    HEMOGLOBIN A1C MONITORING (POCT) 8.8 (H) 4.0 - 6.2 %    ESTIMATED AVERAGE GLUCOSE  206 mg/dL   LIPID PANEL      Result  Value Ref Range    CHOLESTEROL,TOTAL 233 (H) <200 mg/dL    TRIGLYCERIDES 194 (H) <150 mg/dL    HDL CHOLESTEROL  49 23 - 92 mg/dL    NON-HDL CHOLESTEROL 184 (H) <145 mg/dl    CHOL/HDL RATIO            4.76 (H) <4.50                    LDL CHOLESTEROL 145 (H) <100 mg/dL    PATIENT STATUS            NON-FASTING                   URINALYSIS W REFLEX MICROSCOPIC IF POSITIVE      Result  Value Ref Range    COLOR                     Yellow Yellow Color    CLARITY                   Clear Clear Clarity    SPECIFIC GRAVITY,URINE    1.025 1.010, 1.015, 1.020, 1.025                    PH,URINE                  5.5 6.0, 7.0, 8.0, 5.5, 6.5, 7.5, 8.5                    UROBILINOGEN,QUALITATIVE  Normal Normal EU/dl    PROTEIN, URINE Negative Negative mg/dL    GLUCOSE, URINE 100 (A) Negative mg/dL    KETONES,URINE             Negative Negative mg/dL    BILIRUBIN,URINE           Negative Negative                    OCCULT BLOOD,URINE        Negative Negative                    NITRITE                   Negative Negative                    LEUKOCYTE ESTERASE        Negative Negative                   MICROALBUMIN RANDOM URINE      Result  Value Ref Range    ALB RAND URINE            9.3 mg/L    CREATININE,URINE          1.93 g/L    MICROALBUMIN,RAND UR      4.8 <30.0 mg/g creat   URINALYSIS MICROSCOPIC      Result  Value Ref Range    RBC None Seen 0-2, None Seen /HPF    WBC None Seen 0-2, 3-5, None Seen /HPF    BACTERIA                  None Seen None Seen, Rare, Occasional, Few Bacteria/HPF    EPITHELIAL CELLS          None Seen None Seen, Few Epi/HPF       ASSESSMENT AND PLAN:  Shelton was seen today for medication management.    Diagnoses and all orders for this visit:    Arthritis of carpometacarpal (CMC) joints of both thumbs  -     acetaminophen-codeine, 300-60 mg, (TYLENOL #4) 300-60 mg tablet; Take 1 tablet by mouth every 4 hours if needed (for SEVERE Pain - Limit use - no driving after use).  -     gabapentin (NEURONTIN) 100 mg capsule; Take 1-2 capsules by mouth 3 times daily if needed (- for burning/shooting nerve  pain).    Primary osteoarthritis involving multiple joints  -     acetaminophen-codeine, 300-60 mg, (TYLENOL #4) 300-60 mg tablet; Take 1 tablet by mouth every 4 hours if needed (for SEVERE Pain - Limit use - no driving after use).    Anxiety and depression  -     gabapentin (NEURONTIN) 100 mg capsule; Take 1-2 capsules by mouth 3 times daily if needed (- for burning/shooting nerve pain).    Needs flu shot  -     FLU VACCINE => 3 YRS PF QUADRIVALENT IIV4 IM  -     TN ADMIN VACC INITIAL    lab results and schedule of future lab studies reviewed with patient, reviewed diet, exercise and weight control, recommended sodium restriction    -- Expected clinical course discussed   -- Medications and their side effects discussed    The 10-year ASCVD risk score (Liammagalie COLLADO Jr, et al., 2013) is: 7.3%    Values used to calculate the score:      Age: 46 years      Sex: Male      Is Non- : No      Diabetic: Yes      Tobacco smoker: No      Systolic Blood Pressure: 148 mmHg      Is BP treated: No      HDL Cholesterol: 49 mg/dL      Total Cholesterol: 233 mg/dL    Brunoer is also recommended to eat a heart-healthy diet, do regular aerobic exercises, maintain a desirable body weight, and avoid tobacco products. These recommendations are from the American Heart Association (AHA) which stresses the importance of lifestyle changes to lower cardiovascular disease risk.     Return if symptoms worsen or fail to improve.    Patient Instructions   Sounds like your thumb pain / arthritis is at the point of needing surgery.     Trial of Tylenol #4 today.     1. Arthritis of carpometacarpal (CMC) joints of both thumbs  2. Primary osteoarthritis involving multiple joints    - acetaminophen-codeine, 300-60 mg, (TYLENOL #4) 300-60 mg tablet; Take 1 tablet by mouth every 4 hours if needed (for SEVERE Pain - Limit use - no driving after use).  Dispense: 10 tablet; Refill: 0      Call by Monday 10/9 -- with how the Tylenol  #4 is working.     Stop Clonidine.     Increase Wellbutrin up to 1 and 1/2 tablet twice daily -- for depression.     If still having depression - can increase Wellbutrin up to 1.5 tablet in AM and PM.     Neurontin (Gabapentin) 100 mg capsule - take up to 3 times daily as needed for burning/shooting nerve pain -- which can sometimes help with anxiety as well.     - take 1 capsule tonight, then 1 capsule twice daily tomorrow, then 1 capsule 3 times daily the following day -- continue 3 to 6 capsules daily thereafter.   - if they cause too much sleepiness during the day - okay to use all 3 at bedtime instead.       Return as needed for follow-up with Dr. Johnston.    Clinic : [phone]  Appointment line: [phone]      Scott Johnston MD

## 2017-12-28 NOTE — TELEPHONE ENCOUNTER
Patient Information     Patient Name MRN Shelton Lopez [id number] Male 1970      Telephone Encounter by Reanna Cedeno at 2017  3:34 PM     Author:  Reanna Cedeno Service:  (none) Author Type:  (none)     Filed:  2017  3:35 PM Encounter Date:  2017 Status:  Signed     :  Reanna Cedeno            Patient coming in on 17, to discuss further medication side effects.    Reanna Cedeno LPN        2017 3:35 PM

## 2017-12-28 NOTE — PROGRESS NOTES
Patient Information     Patient Name MRN Shelton Lopez [id number] Male 1970      Progress Notes by Scott Johnston MD at 10/10/2017  9:40 AM     Author:  Scott Johnston MD Service:  (none) Author Type:  Physician     Filed:  10/10/2017 12:59 PM Encounter Date:  10/10/2017 Status:  Signed     :  Scott Johnston MD (Physician)            Nursing Notes:   Reanna Cedeno  10/10/2017 10:06 AM  Signed  Patient presents to the clinic for medication follow up.    Previous A1C is not at goal of <8      HEMOGLOBIN A1C MONITORING (POCT) (%)    Date Value   2017 8.8 (H)      Urine microalbumin:creatine: 1.93  Foot exam unknown  Eye exam 2017  Patient is not a current smoker  Patient is on a daily aspirin  Patient is on a Statin.  Blood pressure today of  is  not at the goal of <139/89 for diabetics.     Reanna KISHORE Wilian LPN..............10/4/2017 12:41 PM      Shelton Puentes presents to clinic today for:   Chief Complaint    Patient presents with      Follow Up     HPI: Mr. Puentes is a 46 y.o. male who presents today for evaluation of above.     (M18.0) Arthritis of carpometacarpal (CMC) joints of both thumbs  (primary encounter diagnosis)  (F41.8) Anxiety and depression  (M15.0) Primary osteoarthritis involving multiple joints     Multiple joint arthritis, most significant bothersome joints are his bilateral hand CMC joints at this time.  He has not gotten steroid injections now for almost 6 months.  He was getting them quite regularly, states that the last time or even 2 times he got them however, the pain relief from it was very minimal.  He tried multiple different kinds of medication.  NSAIDs, acetaminophen, codeine, some other different narcotic medications with initial some improvement in pain relief but otherwise shortly thereafter very little.  We tried Tylenol No. 4 had his last appointment last week he actually did not even use it yet.  He started  gabapentin and reports that this was marked improvement for him.  So much so that he wants to go down and work in [place] for the next 5 or 6 months until the springtime at which time he is looking at having surgery on his hands.  He would like prescription modification for his gabapentin today and some refills.  Currently using one gabapentin in the morning 3 in the evening advised that he can spread this out more.  We increased his dosing today.  Reports some pain as bad as 9 or even 10 out of 10.  He was crying due to the pain.  With gabapentin pain improved on into the 5 or 6 out of 10 range at low-dose 100 mg per dose.  Does not really radiate fairly localized pain.  Hand splints do seem to help some as well.  Has been using ice packs intermittently.    Mr. Fishs Body mass index is 49.01 kg/(m^2). This is out of the normal range for a 46 y.o. Normal range for ages 18+ is between 18.5 and 24.9. To lose weight we reviewed risks and benefits of appropriate options such as diet, exercise, and medications. Patient's strategy will be  self-directed nutrition plan and self-directed exercise program   BP Readings from Last 1 Encounters:10/10/17 : 146/82  Mr. Reynolds blood pressure is out of the normal range for adults. Per JNC-8 guidelines normal adult blood pressure is < 120/80, pre-hypertensive is between 120/80 and 139/89, and hypertension is 140/90 or greater. Risks of hypertension were discussed. Patient's strategy will be weight loss, increased activity and reduced salt intake    Functional Capacity: > 4 METS.   Reports that he can climb a flight of stairs without any chest pain/heaviness or shortness of breath.   Patient reports no current symptoms of fevers, chills, nausea/vomiting.   No cough. No shortness of breath.   No change in bowel/bladder habits. No melena, hematochezia. No Hematuria.   No rashes. No palpitations.  No orthopnea/paroxysmal nocturnal dyspnea   No vision or hearing issues.  No  bruising.     ASHVIN:  ASHVIN-7 ANXIETY SCREENING 10/4/2017 10/10/2017   ASHVIN date (doc flow) 10/4/2017 10/10/2017   Nervous, anxious 0 0   Cannot stop worrying 0 0   Worry about different things 0 0   Cannot relax 0 0   Feeling restless 0 0   Easily annoyed/irritated 0 0   Afraid of awful event 0 0   Score 0 0   Severity none none   Some recent data might be hidden         PHQ9:  PHQ Depression Screening 10/4/2017 10/10/2017   Date of PHQ exam (doc flow) 10/4/2017 10/10/2017   1. Lack of interest/pleasure 0 - Not at all 0 - Not at all   2. Feeling down/depressed 0 - Not at all 0 - Not at all   PHQ-2 TOTAL SCORE 0 0   3. Trouble sleeping 0 - Not at all 0 - Not at all   4. Decreased energy 0 - Not at all 0 - Not at all   5. Appetite change 0 - Not at all 0 - Not at all   6. Feelings of failure 0 - Not at all 0 - Not at all   7. Trouble concentrating 0 - Not at all 0 - Not at all   8. Activity level 0 - Not at all 0 - Not at all   9. Hurting yourself 0 - Not at all 0 - Not at all   PHQ-9 TOTAL SCORE 0 0   PHQ-9 Severity Level none none   Functional Impairment not difficult at all not difficult at all   Some recent data might be hidden          I have personally reviewed the past medical history, past surgical history, medications, allergies, family and social history as listed below, on 10/10/2017.    Patient Active Problem List       Diagnosis  Date Noted     Arthritis of carpometacarpal (CMC) joints of both thumbs  08/17/2017     Uncontrolled type 2 diabetes mellitus without complication, without long-term current use of insulin (HC)  07/27/2017     Lumbar spinal stenosis  05/01/2017     Steroid-induced hyperglycemia  11/01/2016     Microcytic red blood cells  11/01/2016     Anxiety and depression  11/01/2016     GWEN on CPAP  11/01/2016     Mixed hyperlipidemia  11/01/2016     Morbid obesity with BMI of 45.0-49.9, adult (HC)  11/01/2016     Chronic pain of both knees  11/01/2016     Chronic midline low back pain with  left-sided sciatica  11/01/2016     Cyst of left kidney  11/01/2016     Achilles tendonitis, bilateral  07/22/2016     Health care maintenance  07/01/2016     Colonoscopy: Age 50  ITALO/PSA: Given family history of early prostate disease, would check periodically to monitor for changes; last in 08/2015, recheck in 1-3 years  AAA screen: Not indicated  Immunizations: UTD on other vaccines.   Lipids/Annual Exam: Done 07/2016, repeat as needed for cholesterol management  Hepatitis C screen: not indicated                Vitamin D deficiency  09/23/2015     Family hx of prostate cancer  08/17/2015     Osteoarthritis of multiple joints  03/05/2015     Past Medical History:     Diagnosis  Date     Achilles tendonitis, bilateral 7/22/2016     Chronic back pain      Depression with anxiety      Obstructive sleep apnea     using nasal pillows; sleeping 8 hours      Osteoarthritis of multiple joints     has had injections       Past Surgical History:      Procedure  Laterality Date     TONSILLECTOMY  as a child     Current Outpatient Prescriptions       Medication  Sig Dispense Refill     acetaminophen-codeine, 300-60 mg, (TYLENOL #4) 300-60 mg tablet Take 1 tablet by mouth every 4 hours if needed (for SEVERE Pain - Limit use - no driving after use). 10 tablet 0     blood sugar diagnostic (ACCU-CHEK SMARTVIEW TEST STRIP) strip Dispense item covered by pt ins. E11.9 NIDDM type II - Test 3 times/day, Reason: High A1C 300 Strip 3     blood-glucose meter Dispense item covered by pt ins. E11.65 NIDDM type II, uncontrolled - Test 3 time/day 1 Device 0     buPROPion (WELLBUTRIN SR) 150 mg Sustained-Release tablet Take 1 pill 3 times daily for Depression 270 tablet 3     celecoxib (CELEBREX) 200 mg capsule Take 1 capsule by mouth 2 times daily with meals. 180 capsule 3     gabapentin (NEURONTIN) 100 mg capsule Take 1-3 capsules by mouth 4 times daily if needed (- for burning/shooting nerve pain). 1080 capsule 1     glipiZIDE (GLUCOTROL)  "5 mg tablet Take 1-2 tablets by mouth 3 times daily before meals - Dose increase 9/8/2017 - Stop Tanzeum at this time 180 tablet 3     HAND SUPPORT misc As directed. Arthritis glove, bilateral. Wear on both hands as needed for pain. Dx: [facility] arthritis, 716.94 (M19.90). 2 Each 0     hydrOXYzine pamoate (VISTARIL) 25 mg capsule Take 1-2 capsules by mouth every 6 hours if needed for Anxiety. 120 capsule 3     Insulin Needles, Disposable, (KO PEN NEEDLE) 32 gauge x 5/32\" As directed. For administering Tanzeum at home. Dx: E11.65 30 Each 3     lancets Dispense item covered by pt ins. E11.65 NIDDM type II, uncontrolled - Test 3 times/day, Reason: High A1C, new start Glipizide 100 Each 11     lancets Dispense item covered by pt ins. E11.65 NIDDM type II, uncontrolled - Test 2 times/day 200 Each 3     methocarbamol (ROBAXIN) 750 mg tablet Take 1 tablet by mouth 3 times daily. As needed for pain 90 tablet 11     Allergies      Allergen   Reactions     Metformin  Other - Describe In Comment Field     Felt very foggy / groggy after taking, when in combination with Robaxin.       Family History      Problem  Relation Age of Onset     Psychiatric illness Father      Psychiatric illness Paternal Grandfather      Psychiatric illness Paternal Uncle      Family Status     Relation  Status     Father Alive    prostate cancer 60's      Mother Alive     Brother Alive    brain tumor age 15; recurrent issues with this      Paternal Grandfather      Paternal Uncle      Social History     Social History        Marital status:       Spouse name: N/A     Number of children:  N/A     Years of education:  N/A     Social History Main Topics          Smoking status:   Never Smoker      Smokeless tobacco:   Never Used      Alcohol use   0.0 oz/week     0 Standard drinks or equivalent per week        Comment: rarely       Drug use:   No      Sexual activity:   Yes      Partners:  Female      Other Topics  Concern     Not on file  " "    Social History Narrative     Originally from [place]; moved back to [facility] in 2012 after living there for another 3-4 years with wife; , no children.  Laid off in winter 2015 from [email or web address] business.  Going to school in [place] for [email or web address] certification.         Pertinent ROS was performed and was negative as noted in HPI above.     EXAM:   Vitals:     10/10/17 0948   BP: 146/82   Pulse: 72   Weight: (!) 166.2 kg (366 lb 6 oz)     BP Readings from Last 3 Encounters:    10/10/17 146/82   10/04/17 148/86   09/08/17 140/78     Wt Readings from Last 3 Encounters:    10/10/17 (!) 166.2 kg (366 lb 6 oz)   10/04/17 (!) 165.2 kg (364 lb 2 oz)   09/08/17 (!) 163.9 kg (361 lb 4 oz)     Estimated body mass index is 49.01 kg/(m^2) as calculated from the following:    Height as of 4/21/17: 1.842 m (6' 0.5\").    Weight as of this encounter: 166.2 kg (366 lb 6 oz).     EXAM:  Constitutional: well groomed / good hygiene, casual dress  Lymphatic Exam: Non-palpable nodes in neck, clavicular regions  Pulmonary: Lungs are clear to auscultation bilaterally, without wheezes or crackles  Cardiovascular Exam: regular rate and rhythm  Gastrointestinal Exam: Obese  Integument: No abnormal rashes, sores, or ulcerations noted  Musculoskeletal Exam: wearing bilateral hand braces   Psychiatric Exam: Awake and Alert, Affect and mood appropriate  Speech is fluent, Thought process is normal    INVESTIGATIONS:  Results for orders placed or performed in visit on 07/27/17      CBC W PLT NO DIFF      Result  Value Ref Range    WHITE BLOOD COUNT         6.8 4.5 - 11.0 thou/cu mm    RED BLOOD COUNT           6.28 (H) 4.30 - 5.90 mil/cu mm    HEMOGLOBIN                16.8 13.5 - 17.5 g/dL    HEMATOCRIT                50.5 37.0 - 53.0 %    MCV                       80 80 - 100 fL    MCH                       26.8 26.0 - 34.0 pg    MCHC                      33.3 32.0 - 36.0 g/dL    RDW                       14.2 11.5 - 15.5 %    PLATELET COUNT      "       152 140 - 440 thou/cu mm    MPV                       10.9 6.5 - 11.0 fL   COMP METABOLIC PANEL      Result  Value Ref Range    SODIUM 139 133 - 143 mmol/L    POTASSIUM 4.3 3.5 - 5.1 mmol/L    CHLORIDE 102 98 - 107 mmol/L    CO2,TOTAL 25 21 - 31 mmol/L    ANION GAP 12 5 - 18                    GLUCOSE 178 (H) 70 - 105 mg/dL    CALCIUM 9.6 8.6 - 10.3 mg/dL    BUN 15 7 - 25 mg/dL    CREATININE 0.86 0.70 - 1.30 mg/dL    BUN/CREAT RATIO           17                    GFR if African American >60 >60 ml/min/1.73m2    GFR if not African American >60 >60 ml/min/1.73m2    ALBUMIN 4.2 3.5 - 5.7 g/dL    PROTEIN,TOTAL 7.1 6.4 - 8.9 g/dL    GLOBULIN                  2.9 2.0 - 3.7 g/dL    A/G RATIO 1.4 1.0 - 2.0                    BILIRUBIN,TOTAL 1.1 (H) 0.3 - 1.0 mg/dL    ALK PHOSPHATASE 34 34 - 104 IU/L    ALT (SGPT) 35 7 - 52 IU/L    AST (SGOT) 23 13 - 39 IU/L   HEMOGLOBIN A1C MONITORING (POCT)      Result  Value Ref Range    HEMOGLOBIN A1C MONITORING (POCT) 8.8 (H) 4.0 - 6.2 %    ESTIMATED AVERAGE GLUCOSE  206 mg/dL   LIPID PANEL      Result  Value Ref Range    CHOLESTEROL,TOTAL 233 (H) <200 mg/dL    TRIGLYCERIDES 194 (H) <150 mg/dL    HDL CHOLESTEROL 49 23 - 92 mg/dL    NON-HDL CHOLESTEROL 184 (H) <145 mg/dl    CHOL/HDL RATIO            4.76 (H) <4.50                    LDL CHOLESTEROL 145 (H) <100 mg/dL    PATIENT STATUS            NON-FASTING                   URINALYSIS W REFLEX MICROSCOPIC IF POSITIVE      Result  Value Ref Range    COLOR                     Yellow Yellow Color    CLARITY                   Clear Clear Clarity    SPECIFIC GRAVITY,URINE    1.025 1.010, 1.015, 1.020, 1.025                    PH,URINE                  5.5 6.0, 7.0, 8.0, 5.5, 6.5, 7.5, 8.5                    UROBILINOGEN,QUALITATIVE  Normal Normal EU/dl    PROTEIN, URINE Negative Negative mg/dL    GLUCOSE, URINE 100 (A) Negative mg/dL    KETONES,URINE             Negative Negative mg/dL    BILIRUBIN,URINE           Negative Negative                     OCCULT BLOOD,URINE        Negative Negative                    NITRITE                   Negative Negative                    LEUKOCYTE ESTERASE        Negative Negative                   MICROALBUMIN RANDOM URINE      Result  Value Ref Range    ALB RAND URINE            9.3 mg/L    CREATININE,URINE          1.93 g/L    MICROALBUMIN,RAND UR      4.8 <30.0 mg/g creat   URINALYSIS MICROSCOPIC      Result  Value Ref Range    RBC None Seen 0-2, None Seen /HPF    WBC None Seen 0-2, 3-5, None Seen /HPF    BACTERIA                  None Seen None Seen, Rare, Occasional, Few Bacteria/HPF    EPITHELIAL CELLS          None Seen None Seen, Few Epi/HPF       ASSESSMENT AND PLAN:  Shelton was seen today for follow up.    Diagnoses and all orders for this visit:    Arthritis of carpometacarpal (CMC) joints of both thumbs  -     gabapentin (NEURONTIN) 100 mg capsule; Take 1-3 capsules by mouth 4 times daily if needed (- for burning/shooting nerve pain).    Anxiety and depression  -     gabapentin (NEURONTIN) 100 mg capsule; Take 1-3 capsules by mouth 4 times daily if needed (- for burning/shooting nerve pain).  -     buPROPion (WELLBUTRIN SR) 150 mg Sustained-Release tablet; Take 1 pill 3 times daily for Depression  -     hydrOXYzine pamoate (VISTARIL) 25 mg capsule; Take 1-2 capsules by mouth every 6 hours if needed for Anxiety.    Primary osteoarthritis involving multiple joints  -     gabapentin (NEURONTIN) 100 mg capsule; Take 1-3 capsules by mouth 4 times daily if needed (- for burning/shooting nerve pain).  -     celecoxib (CELEBREX) 200 mg capsule; Take 1 capsule by mouth 2 times daily with meals.      lab results and schedule of future lab studies reviewed with patient, reviewed diet, exercise and weight control, recommended sodium restriction    -- Expected clinical course discussed   -- Medications and their side effects discussed    The 10-year ASCVD risk score ([place] TOBIAS Jr, et al., 2013) is: 7.1%     Values used to calculate the score:      Age: 46 years      Sex: Male      Is Non- : No      Diabetic: Yes      Tobacco smoker: No      Systolic Blood Pressure: 146 mmHg      Is BP treated: No      HDL Cholesterol: 49 mg/dL      Total Cholesterol: 233 mg/dL    Shelton is also recommended to eat a heart-healthy diet, do regular aerobic exercises, maintain a desirable body weight, and avoid tobacco products. These recommendations are from the American Heart Association (AHA) which stresses the importance of lifestyle changes to lower cardiovascular disease risk.     Return if symptoms worsen or fail to improve.    Patient Instructions   1. Anxiety and depression    -- Glad the Gabapentin has helped. Refill sent to [facility].     - gabapentin (NEURONTIN) 100 mg capsule; Take 1-3 capsules by mouth 4 times daily if needed (- for burning/shooting nerve pain).  Dispense: 1080 capsule; Refill: 1  - buPROPion (WELLBUTRIN SR) 150 mg Sustained-Release tablet; Take 1 pill 3 times daily for Depression  Dispense: 270 tablet; Refill: 3  - hydrOXYzine pamoate (VISTARIL) 25 mg capsule; Take 1-2 capsules by mouth every 6 hours if needed for Anxiety.  Dispense: 120 capsule; Refill: 3    2. Arthritis of carpometacarpal (CMC) joints of both thumbs    - gabapentin (NEURONTIN) 100 mg capsule; Take 1-3 capsules by mouth 4 times daily if needed (- for burning/shooting nerve pain).  Dispense: 1080 capsule; Refill: 1      -- Try to avoid Carbohydrates as much as possible -- breads, pasta, baked goods, cakes.   These are turned to sugar in one metabolic conversion, cause insulin secretion and increased fat deposition / weight gain.        Return as needed for follow-up with Dr. Johnston.    Clinic : [phone]  Appointment line: [phone]      Scott Johnston MD

## 2017-12-28 NOTE — TELEPHONE ENCOUNTER
"Patient Information     Patient Name MRN Shelton Lopez [id number] Male 1970      Telephone Encounter by Milly Burciaga at 2017 11:11 AM     Author:  Milly Burciaga Service:  (none) Author Type:  (none)     Filed:  2017 11:19 AM Encounter Date:  2017 Status:  Signed     :  Milly Burciaga            Caller reports that patient was having problems with taking the bupropion. States at the time when he was up to 3 tablets of the 150 mg , he was having worse and severe anxiety, did decrease to 2 tablets, still had some anxiety, and also felt \"like he was getting electrocuted\". Now has weaned down to 1 tablet. States still has some Effexor 75 mg SR that patient would like to switch back to. Worked better for him. Is there a weaning process to go to the Effexor from the bupropion?  States is having a hard time where he is at working, lots of traffic and people, has even cried and is nervous, etc.     Also is up to taking 6 tablets of the gabapentin for his hands. Still having pain. Wondering if patient can take Aleeve also or anything else prn OTC that may help. Patient currently is in [place]  Milly Burciaga LPN...................2017   11:18 AM         "

## 2017-12-28 NOTE — TELEPHONE ENCOUNTER
Patient Information     Patient Name MRN Sex Shelton Benson [id number] Male 1970      Telephone Encounter by Scott Johnston MD at 2017  7:55 AM     Author:  Scott Johnston MD Service:  (none) Author Type:  Physician     Filed:  2017  7:56 AM Encounter Date:  2017 Status:  Signed     :  Scott Johnston MD (Physician)            Orthopedic referral sent  - they will call with date/time of appointment.      Call [phone] or [phone]  Otherwise -- [phone]  - or -  [phone]     -- schedule an appointment with the orthopedic clinic:   -- Dr. Amauri Johnston MD

## 2017-12-28 NOTE — TELEPHONE ENCOUNTER
Patient Information     Patient Name MRN Sex Shelton Benson [id number] Male 1970      Telephone Encounter by Reanna Cedeno at 2017  8:56 AM     Author:  Reanna Cedeno Service:  (none) Author Type:  (none)     Filed:  2017  8:56 AM Encounter Date:  2017 Status:  Signed     :  Reanna Cedeno            Left message for [facility] nurse to call back.    Reanna Cedeno LPN        2017 8:56 AM

## 2017-12-28 NOTE — TELEPHONE ENCOUNTER
Patient Information     Patient Name MRN Shelton Lopez [id number] Male 1970      Telephone Encounter by Brisa Paul at 11/10/2017  1:12 PM     Author:  Brisa Paul Service:  (none) Author Type:  (none)     Filed:  11/10/2017  1:12 PM Encounter Date:  2017 Status:  Signed     :  Brisa Paul            Patient notified  Brisa Paul LPN     11/10/2017 1:12 PM

## 2017-12-28 NOTE — TELEPHONE ENCOUNTER
Patient Information     Patient Name MRN Sex Shelton Benson [id number] Male 1970      Telephone Encounter by Mina Hill LPN at 2017 11:24 AM     Author:  Mina Hill LPN Service:  (none) Author Type:  NURS- Licensed Practical Nurse     Filed:  2017 11:25 AM Encounter Date:  2017 Status:  Signed     :  Mina Hill LPN (NURS- Licensed Practical Nurse)            Left message to call back  ...................Mina Hill LPN....2017 11:25 AM

## 2017-12-28 NOTE — TELEPHONE ENCOUNTER
Patient Information     Patient Name MRN Shelton Lopez [id number] Male 1970      Telephone Encounter by Angela Villanueva at 2017  2:24 PM     Author:  Angela Villanueva Service:  (none) Author Type:  (none)     Filed:  2017  2:31 PM Encounter Date:  2017 Status:  Signed     :  Angela Villanueva            Called Skye back, but I do not have a consent form signed to discuss this patient with her.  She was upset about this and wanted to talk to Reanna.  Explained that I had already talked with her and we would need to speak with Milton.  He was currently in the shop and unavailable.  Asked for him to call us back.  She requested that the consent form be sent to her via email or mail.  I told her I did not know if I could do that but would have the answer when he calls back.  Per Akanksha (supervisor) the patient must be here on sight to complete the consent form.  Please let them know this.  Angela Villanueva CMA ([facility])................ 2017 2:30 PM

## 2017-12-28 NOTE — TELEPHONE ENCOUNTER
Patient Information     Patient Name MRN Sex Shelton Benson [id number] Male 1970      Telephone Encounter by Clarisse Reyes at 2017 10:56 AM     Author:  Clarisse Reyes Service:  (none) Author Type:  (none)     Filed:  2017 10:57 AM Encounter Date:  2017 Status:  Signed     :  Clarisse Reyes            Spoke with Skye again, and sheduled both her and Shelton for 10/4/17 at 12:40 and 1:00. Clarisse Reyes LPN......................2017 10:56 AM

## 2017-12-28 NOTE — TELEPHONE ENCOUNTER
Patient Information     Patient Name MRN Shelton Lopez [id number] Male 1970      Telephone Encounter by Scott Johnston MD at 11/10/2017  1:01 PM     Author:  Scott Johnston MD Service:  (none) Author Type:  Physician     Filed:  11/10/2017  1:02 PM Encounter Date:  2017 Status:  Signed     :  Scott Johnston MD (Physician)            Wean off Wellbutrin -- just like he has been doing.    Okay to restart Effexor, 75 mg daily.    Scott Johnston MD

## 2017-12-28 NOTE — PROGRESS NOTES
Patient Information     Patient Name MRN Shelton Lopez [id number] Male 1970      Progress Notes by Stacia Perez at 2017 11:18 AM     Author:  Stacia Perez Service:  (none) Author Type:  (none)     Filed:  2017 11:18 AM Date of Service:  2017 11:18 AM Status:  Signed     :  Stacia Perez            RECOVERY TIME  You may experience numbness and/or relief of your pain for up to 4-6 hours after the injection.  Your usual symptoms may return the night of the procedure and may possible be more severe than usual a day or two following.  Please keep track of your pain over the next several days and report how long the relief lasts to the doctor who referred you for this procedure.    The beneficial effects of the steroids usually require 2 to 3 days to take effect, buy may take as long as 5 to 7 days.  If there is no change in the pain, then investigation can be focused on other possible sources of your pain.  In either case, the information is useful to the doctor who referred you for this procedure.    POSSIBLE SIDE EFFECTS  Facial flushing (redness), occasional low grade fevers of 99.5F or less, hiccups, insomnia, headaches, increased heart rate, abdominal cramping, and/or a bloating feeling are side effects of the steroid medications and will go away 3 to 4 days after the injection.    Diabetic Patients  The steroids you have received may significantly increase your blood sugar levels.  Monitor your blood sugar level closely (4-6 times per day) for a period of 4 days or until your blood sugar level normalizes.  If your blood sugar level elevates significantly or you experience confusion, dizziness, sweating, please notify our primary physician and make him/her aware that you have received steroids.

## 2017-12-28 NOTE — TELEPHONE ENCOUNTER
Patient Information     Patient Name MRN Sex Shelton Benson [id number] Male 1970      Telephone Encounter by Brisa Paul at 2017  1:07 PM     Author:  Brisa Paul Service:  (none) Author Type:  (none)     Filed:  2017  1:09 PM Encounter Date:  2017 Status:  Signed     :  Brisa Paul            Spoke with wife she did speak with the surgeons office again and they let her know to bring what xrays she had and if MRI is needed they will order. Transferred wife to scheduling line for xray appt as patient is on his way now for these. Brisa Paul LPN .......................2017  1:08 PM

## 2017-12-28 NOTE — TELEPHONE ENCOUNTER
Patient Information     Patient Name MRN Shelton Lopez [id number] Male 1970      Telephone Encounter by Maxine Beltran RN at 2017  9:45 AM     Author:  Maxine Beltran RN Service:  (none) Author Type:  NURS- Registered Nurse     Filed:  2017 10:11 AM Encounter Date:  2017 Status:  Signed     :  Maxine Beltran RN (NURS- Registered Nurse)            Returned call to patient regarding his feeling that recent medication changes are not working for him. On 17 patient was seen in office by Scott Johnston MD and it was decided to wean off of Effexor and start Bupropion. Per patient he is having the same withdrawal symptoms that he felt the last time he attempted to wean off of Effexor approximately 6 months ago. Has c/o sweats, nausea, jittery/electric feelings, anxiety, and alternating hot and cold flashes. Per patient he has followed the protocol discussed in office starting Bupropion take 1 tablet for one week and then has been taking 2 tablets daily.     Patient feels the Bupropion is not helping his anxiety or symptoms at this point, and wants to know if there is something else that can be done or if he should go back to the Effexor - though he states he had wanted to get off of it due to weight gain. Patient denies allergic reactions, denies any thoughts of harming himself or others. Routed to Scott Johnston MD for consideration.     Maxine Beltran RN ....................  2017   10:09 AM

## 2017-12-28 NOTE — TELEPHONE ENCOUNTER
Patient Information     Patient Name MRN Sex Shelton Benson [id number] Male 1970      Telephone Encounter by Clarisse Reyes at 2017  8:54 AM     Author:  Clarisse Reyes Service:  (none) Author Type:  (none)     Filed:  2017  8:54 AM Encounter Date:  2017 Status:  Signed     :  Clarisse Reyes            LMTCB. Clarisse Reyes LPN......................2017 8:54 AM

## 2017-12-28 NOTE — TELEPHONE ENCOUNTER
Patient Information     Patient Name MRN Sex Shelton Benson [id number] Male 1970      Telephone Encounter by Reanna Cedeno at 2017  2:49 PM     Author:  Reanna Cedeno Service:  (none) Author Type:  (none)     Filed:  2017  2:52 PM Encounter Date:  2017 Status:  Signed     :  Reanna Cedeno            Patient needs new MRI and xray of bilateral hands.    Orders pending.    Reanna Cedeno LPN        2017 2:49 PM

## 2017-12-28 NOTE — TELEPHONE ENCOUNTER
Patient Information     Patient Name MRN Shelton Lopez [id number] Male 1970      Telephone Encounter by Maxine Beltran RN at 2017 10:28 AM     Author:  Maxine Beltran RN Service:  (none) Author Type:  NURS- Registered Nurse     Filed:  2017 10:40 AM Encounter Date:  2017 Status:  Signed     :  Maxine Beltran RN (NURS- Registered Nurse)            Called patient to give message from Scott Johnston MD and inform him that an Rx was sent into pharmacy for Clonidine to address symptoms of anxiety related to weaning off of Effexor.  Explained to patient that per Scott Johnston MD said some side effects can be expected as he weans off of Effexor, and that patient should continue to take 2 tablets of Wellbutrin daily as it may not be showing effect yet after only 10 days. Patient stated understanding that clonidine is to help as needed with anxiety symptoms in this short term period while experiencing withdrawal symptoms.   Patient agreed he will call us back if symptoms of anxiety or depression worsen or if the clonidine does not help to manage symptoms. Stated he will try this plan per Scott Johnston's suggestion.   Maxine Beltran RN ....................  2017   10:39 AM

## 2017-12-28 NOTE — TELEPHONE ENCOUNTER
Patient Information     Patient Name MRN Shelton Lopez [id number] Male 1970      Telephone Encounter by Scott Johnston MD at 2017  9:41 AM     Author:  Scott Johnston MD Service:  (none) Author Type:  Physician     Filed:  2017  9:42 AM Encounter Date:  2017 Status:  Signed     :  Scott Johnston MD (Physician)            Continue clonidine.  I sent him a refill.    The sooner he gets off Effexor, would anticipate the sooner he can stop the clonidine.    Some people use clonidine on a regular basis to help with anxiety.    We can look at increasing his Wellbutrin in the future if needed.     Have him schedule an appointment in 3-4 weeks for follow-up.    Scott Johnston MD

## 2017-12-28 NOTE — TELEPHONE ENCOUNTER
Patient Information     Patient Name MRN Sex Shelton Benson [id number] Male 1970      Telephone Encounter by Clarisse Reyes at 2017  9:23 AM     Author:  Clarisse Reyes Service:  (none) Author Type:  (none)     Filed:  2017  9:24 AM Encounter Date:  2017 Status:  Signed     :  Clarisse Reyes            Spoke with patient's wife, she stated she found his old RX from last year so he is using that to wean off of the Effexor. Clarisse Reyes LPN......................2017 9:24 AM

## 2017-12-28 NOTE — TELEPHONE ENCOUNTER
Patient Information     Patient Name MRN Sex Shelton Benson [id number] Male 1970      Telephone Encounter by Mina Hill LPN at 2017 11:52 AM     Author:  Mina Hill LPN Service:  (none) Author Type:  NURS- Licensed Practical Nurse     Filed:  2017 11:54 AM Encounter Date:  2017 Status:  Signed     :  Mina Hill LPN (NURS- Licensed Practical Nurse)            Patient needs orders for thumb injections. Patient states it has not helped much since he had them done 5/15/17. Please advise.  Mina Hill LPN ..............2017 11:53 AM

## 2017-12-28 NOTE — TELEPHONE ENCOUNTER
Patient Information     Patient Name MRN Sex Shelton Benson [id number] Male 1970      Telephone Encounter by Scott Johnston MD at 2017  7:46 AM     Author:  Soctt Johnston MD Service:  (none) Author Type:  Physician     Filed:  2017  7:47 AM Encounter Date:  2017 Status:  Signed     :  Scott Johnston MD (Physician)            The orthopedic surgeon down in [place] needs bilateral hand MRIs prior to his appointment??    Xrays are not enough ?    Scott Johnston MD

## 2017-12-28 NOTE — TELEPHONE ENCOUNTER
Patient Information     Patient Name MRN Sex Shelton Benson [id number] Male 1970      Telephone Encounter by Sasha Davis at 2017 10:27 AM     Author:  Sasha Davis Service:  (none) Author Type:  (none)     Filed:  2017 10:27 AM Encounter Date:  2017 Status:  Signed     :  Sasha Davis            Patient stopped by the window to see if [facility] would put an order in for Bilateral thumb injections with CDI.  Please call to discuss and advise.  Sasha Davis ....................  2017   10:27 AM

## 2017-12-28 NOTE — TELEPHONE ENCOUNTER
Patient Information     Patient Name MRN Sex Shelton Benson [id number] Male 1970      Telephone Encounter by Clarisse Reyes at 2017  4:13 PM     Author:  Clarisse Reyes Service:  (none) Author Type:  (none)     Filed:  2017  4:15 PM Encounter Date:  2017 Status:  Signed     :  Clarisse Reyes            Spoke with pharmacy, they stated patient is upset because he wants the name brand of Effexor but the name brand doesn't come in tablets so unable to wean off of the name brand capsule that way. Clarisse Reyes LPN......................2017 4:14 PM

## 2017-12-28 NOTE — PATIENT INSTRUCTIONS
Patient Information     Patient Name MRN Shelton Lopez [id number] Male 1970      Patient Instructions by Scott Johnston MD at 2017 11:00 AM     Author:  Scott Johnston MD  Service:  (none) Author Type:  Physician     Filed:  2017 11:42 AM  Encounter Date:  2017 Status:  Addendum     :  Scott Johnston MD (Physician)        Related Notes: Original Note by Scott Johnston MD (Physician) filed at 2017 11:38 AM            Labs today.     Start Aleve -- take twice daily with food.   -- stop Celebrex.     Need to try cut down / stop breads, carbohydrates, pasta.   Reduce sugar intake.     Medications refilled.     -- may need to consider other medications to help with your glucose control.         Return for Diabetes labs and clinic follow-up appointment every 3 to 4 months.  --- (Go for about 91 to 100 days)    Schedule lab only appointment --- A few days AFTER: 10/25/17     Schedule clinic appointment with Dr. Johnston -- Same day as labs, or 1-2 days later.     Insurance companies are now grading you and I on your blood sugar control -- Goal is to get your A1c down to 7.9% or lower and NO Smoking!    -- Medicare and most insurance companies, will only cover Hemoglobin A1c labs to be rechecked every 91+ days.      HEMOGLOBIN A1C MONITORING (POCT) (%)    Date Value   2017 10.2 (H)        Next follow-up appointment with Dr. Johnston should be scheduled:  -- Approximately a few days AFTER: 10/25/17

## 2017-12-28 NOTE — TELEPHONE ENCOUNTER
Patient Information     Patient Name MRN Sex Shelton Benson [id number] Male 1970      Telephone Encounter by Reanna Cedeno at 2017 10:03 AM     Author:  Reanna Cedeno Service:  (none) Author Type:  (none)     Filed:  2017 10:04 AM Encounter Date:  2017 Status:  Signed     :  Reanna Cedeno            Left message for [facility] staff to call back.    Reanna Cedeno LPN        2017 10:03 AM

## 2017-12-28 NOTE — TELEPHONE ENCOUNTER
Patient Information     Patient Name MRN Sex Shelton Benson [id number] Male 1970      Telephone Encounter by Scott Johnston MD at 2017 10:21 AM     Author:  Scott Johnston MD Service:  (none) Author Type:  Physician     Filed:  2017 10:22 AM Encounter Date:  2017 Status:  Signed     :  Scott Johnston MD (Physician)            Hand xrays are ordered.     If the surgeon wants other Imaging -- they will get those arranged, based on what the surgeon wants to do / look at.     Scott Johnston MD

## 2017-12-28 NOTE — TELEPHONE ENCOUNTER
Patient Information     Patient Name MRN Sex Shelton Benson [id number] Male 1970      Telephone Encounter by Lizzette Palumbo at 2017  4:10 PM     Author:  Lizzette Palumbo Service:  (none) Author Type:  (none)     Filed:  2017  4:19 PM Encounter Date:  2017 Status:  Signed     :  Lizzette Palumbo            Patient was called and he stated that he already spoke with someone, but thanks for calling.  Lizzette Palumbo LPN...............2017   4:12 PM

## 2017-12-28 NOTE — TELEPHONE ENCOUNTER
Patient Information     Patient Name MRN Sex Shelton Benson [id number] Male 1970      Telephone Encounter by Maxine Beltran RN at 2017  9:45 AM     Author:  Maxine Beltran RN Service:  (none) Author Type:  NURS- Registered Nurse     Filed:  2017 10:01 AM Encounter Date:  2017 Status:  Signed     :  Maxine Beltran RN (NURS- Registered Nurse)            Duplicate encounter. See telephone encounter note routed to Scott Johnston MD.   Maxine Beltran RN ....................  2017   10:01 AM

## 2017-12-28 NOTE — PROGRESS NOTES
Patient Information     Patient Name MRN Sex     Shelton Puentes [id number] Male 1970      Progress Notes by Stacia Perez at 2017  9:48 AM     Author:  Stacia Perez Service:  (none) Author Type:  (none)     Filed:  2017  9:48 AM Date of Service:  2017  9:48 AM Status:  Signed     :  Stacia Perez            [place] Protocol    A. Pre-procedure verification complete yes  1-relevant information / documentation available, reviewed and properly matched to the patient; 2-consent accurate and complete, 3-equipment and supplies available    B. Site marking complete Yes  Site marked if not in continuous attendance with patient    C. TIME OUT completed yes  Time Out was conducted just prior to starting procedure to verify the eight required elements: 1-patient identity, 2-consent accurate and complete, 3-position, 4-correct side/site marked (if applicable), 5-procedure, 6-relevant images / results properly labeled and displayed (if applicable), 7-antibiotics / irrigation fluids (if applicable), 8-safety precautions.             ASD (atrial septal defect)    Asthma    Ectopic testis, unilateral ASD (atrial septal defect)    Asthma    Atrial flutter, unspecified type  as a .  Ectopic testis, unilateral

## 2017-12-28 NOTE — TELEPHONE ENCOUNTER
Patient Information     Patient Name MRN Sex Shelton Benson [id number] Male 1970      Telephone Encounter by Rachelle Delaney at 2017 10:03 AM     Author:  Rachelle Delaney Service:  (none) Author Type:  (none)     Filed:  2017 10:07 AM Encounter Date:  2017 Status:  Signed     :  Rachelle Delaney            Patients Name and date of birth verified, information from Dr. Johnston given to patient .  Referral has been sent.  Rachelle Delaney LPN..............2017 10:06 AM

## 2017-12-28 NOTE — TELEPHONE ENCOUNTER
Patient Information     Patient Name MRN Sex Shelton Benson [id number] Male 1970      Telephone Encounter by Scott Johnston MD at 2017  7:35 PM     Author:  Scott Johnston MD Service:  (none) Author Type:  Physician     Filed:  2017  7:36 PM Encounter Date:  2017 Status:  Signed     :  Scott Johnston MD (Physician)            Patient is only going to be on this lower dose of Effexor for one maybe 2 weeks and then discontinuing it.  It should be fine.    Scott Johnston MD

## 2017-12-28 NOTE — PATIENT INSTRUCTIONS
Patient Information     Patient Name MRN Shelton Lopez [id number] Male 1970      Patient Instructions by Scott Johnston MD at 10/4/2017 12:40 PM     Author:  Scott Johnston MD  Service:  (none) Author Type:  Physician     Filed:  10/4/2017  1:39 PM  Encounter Date:  10/4/2017 Status:  Addendum     :  Scott Johnston MD (Physician)        Related Notes: Original Note by Scott Johnston MD (Physician) filed at 10/4/2017  1:36 PM            Sounds like your thumb pain / arthritis is at the point of needing surgery.     Trial of Tylenol #4 today.     1. Arthritis of carpometacarpal (CMC) joints of both thumbs  2. Primary osteoarthritis involving multiple joints    - acetaminophen-codeine, 300-60 mg, (TYLENOL #4) 300-60 mg tablet; Take 1 tablet by mouth every 4 hours if needed (for SEVERE Pain - Limit use - no driving after use).  Dispense: 10 tablet; Refill: 0      Call by Monday 10/9 -- with how the Tylenol #4 is working.     Stop Clonidine.     Increase Wellbutrin up to 1 and 1/2 tablet twice daily -- for depression.     If still having depression - can increase Wellbutrin up to 1.5 tablet in AM and PM.     Neurontin (Gabapentin) 100 mg capsule - take up to 3 times daily as needed for burning/shooting nerve pain -- which can sometimes help with anxiety as well.     - take 1 capsule tonight, then 1 capsule twice daily tomorrow, then 1 capsule 3 times daily the following day -- continue 3 to 6 capsules daily thereafter.   - if they cause too much sleepiness during the day - okay to use all 3 at bedtime instead.       Return as needed for follow-up with Dr. Johnston.    Clinic : [phone]  Appointment line: [phone]

## 2017-12-28 NOTE — TELEPHONE ENCOUNTER
Patient Information     Patient Name MRN Sex Shelton Benson [id number] Male 1970      Telephone Encounter by Lana Mckinley NP at 2017 12:07 PM     Author:  Lana Mckinley NP Service:  (none) Author Type:  PHYS- Nurse Practitioner     Filed:  2017 12:17 PM Encounter Date:  2017 Status:  Signed     :  Lana Mckinley NP (PHYS- Nurse Practitioner)            [employer] is not covering Contour products and will not approve PA for Contour test strips.     [employer] now covering Accu chek.    Spoke with pt's wife and discussed insurance coverage changes and will send new order for Accu chek spenser meter and strips to [facility].     Lana Mckinley NP ....................  2017   12:17 PM

## 2017-12-28 NOTE — PROGRESS NOTES
Patient Information     Patient Name MRN Sex Shelton Benson [id number] Male 1970      Progress Notes by Stacia Perez at 2017 11:18 AM     Author:  Stacia Perez Service:  (none) Author Type:  (none)     Filed:  2017 11:18 AM Date of Service:  2017 11:18 AM Status:  Signed     :  Stacia Perez            Falls Risk Criteria:    Age 65 and older or under age 4        Sensory deficits    Poor vision    Use of ambulatory aides    Impaired judgment    Unable to walk independently    Meets High Risk criteria for falls:  no

## 2017-12-28 NOTE — PROGRESS NOTES
Patient Information     Patient Name MRN Shelton Lopez [id number] Male 1970      Progress Notes by Stacia Perez at 2017  9:48 AM     Author:  Stacia Perez Service:  (none) Author Type:  (none)     Filed:  2017  9:48 AM Date of Service:  2017  9:48 AM Status:  Signed     :  Stacia Perez            RECOVERY TIME  You may experience numbness and/or relief of your pain for up to 4-6 hours after the injection.  Your usual symptoms may return the night of the procedure and may possible be more severe than usual a day or two following.  Please keep track of your pain over the next several days and report how long the relief lasts to the doctor who referred you for this procedure.    The beneficial effects of the steroids usually require 2 to 3 days to take effect, buy may take as long as 5 to 7 days.  If there is no change in the pain, then investigation can be focused on other possible sources of your pain.  In either case, the information is useful to the doctor who referred you for this procedure.    POSSIBLE SIDE EFFECTS  Facial flushing (redness), occasional low grade fevers of 99.5F or less, hiccups, insomnia, headaches, increased heart rate, abdominal cramping, and/or a bloating feeling are side effects of the steroid medications and will go away 3 to 4 days after the injection.    Diabetic Patients  The steroids you have received may significantly increase your blood sugar levels.  Monitor your blood sugar level closely (4-6 times per day) for a period of 4 days or until your blood sugar level normalizes.  If your blood sugar level elevates significantly or you experience confusion, dizziness, sweating, please notify our primary physician and make him/her aware that you have received steroids.

## 2017-12-28 NOTE — TELEPHONE ENCOUNTER
Patient Information     Patient Name MRN Shelton Lopez [id number] Male 1970      Telephone Encounter by Reanna Cedeno at 2017 11:47 AM     Author:  Reanna Cedeno Service:  (none) Author Type:  (none)     Filed:  2017 11:49 AM Encounter Date:  2017 Status:  Signed     :  Reanna Cedeno            Spouse called to express concerns about diabetes management.  Patient stopped taking the Tanzeum 2 weeks ago and transitioned to Glipizide 5 mg 0.5-1 tablet TID.  Glucose levels were around 100 when patient first changed over.  Now glucose readings are 180-200.  Patient has been taking a full tablet TID over the past 10 days.    Please advise.    Reanna Cedeno LPN        2017 11:49 AM

## 2017-12-28 NOTE — TELEPHONE ENCOUNTER
Patient Information     Patient Name MRN Sex Shelton Benson [id number] Male 1970      Telephone Encounter by Reanna Cedeno at 2017 11:10 AM     Author:  Reanna Cedeno Service:  (none) Author Type:  (none)     Filed:  2017 11:11 AM Encounter Date:  2017 Status:  Signed     :  Reanna Cedeno            [facility] do not require specific imaging, it is up to Scott Johnston MD as to what images need to go there for patients upcoming visit.  If provider in [facility] wants anything, after the consult he will order it.    Reanna Cedeno LPN        2017 11:11 AM

## 2017-12-29 NOTE — PATIENT INSTRUCTIONS
Patient Information     Patient Name MRN Shelton Lopez [id number] Male 1970      Patient Instructions by Scott Johnston MD at 2017  1:20 PM     Author:  Scott Johnston MD  Service:  (none) Author Type:  Physician     Filed:  2017  2:17 PM  Encounter Date:  2017 Status:  Addendum     :  Scott Johnston MD (Physician)        Related Notes: Original Note by Scott Johnston MD (Physician) filed at 2017  2:13 PM            1. Uncontrolled type 2 diabetes mellitus without complication, without long-term current use of insulin (HC)  - glipiZIDE (GLUCOTROL) 5 mg tablet; Take 1-2 tablets by mouth 3 times daily before meals - Dose increase 2017 - Stop Tanzeum at this time  Dispense: 180 tablet; Refill: 3    2. GWEN on CPAP    3. Arthritis of carpometacarpal (CMC) joints of both thumbs    4. Vitamin D deficiency  - cholecalciferol (VITAMIN D3) 5,000 unit capsule; Take 1 capsule by mouth once daily. For Vitamin D Deficiency  Dispense: 100 capsule; Refill: 3    5. Steroid-induced hyperglycemia  - glipiZIDE (GLUCOTROL) 5 mg tablet; Take 1-2 tablets by mouth 3 times daily before meals - Dose increase 2017 - Stop Tanzeum at this time  Dispense: 180 tablet; Refill: 3    6. Anxiety and depression  - buPROPion (WELLBUTRIN SR) 150 mg Sustained-Release tablet; Take 1 tablet by mouth 2 times daily. -- start with 1 daily x1 week -- then increase to twice daily - Stop Effexor  Dispense: 180 tablet; Refill: 3    - venlafaxine (EFFEXOR) 37.5 mg tablet; Take 1 tablet by mouth every morning. Drop to 75 mg daily x3 days, then 37.5 mg x3 days, then 1/2 tablet daily x4 days - wean off Effexor        --> Trial of Tylenol #3 -- for your arthritis pain.      Return in approximately 7 month(s), or sooner as needed for follow-up with Dr. Johnston.  - follow-up anxiety, diabetes    Clinic : [phone]  Appointment line: [phone]

## 2017-12-29 NOTE — PATIENT INSTRUCTIONS
Patient Information     Patient Name MRN Shelton Lopez [id number] Male 1970      Patient Instructions by Scott Johnston MD at 10/10/2017  9:40 AM     Author:  Scott Johnston MD  Service:  (none) Author Type:  Physician     Filed:  10/10/2017 10:26 AM  Encounter Date:  10/10/2017 Status:  Addendum     :  Scott Johnston MD (Physician)        Related Notes: Original Note by Scott Johnston MD (Physician) filed at 10/10/2017 10:22 AM            1. Anxiety and depression    -- Glad the Gabapentin has helped. Refill sent to [facility].     - gabapentin (NEURONTIN) 100 mg capsule; Take 1-3 capsules by mouth 4 times daily if needed (- for burning/shooting nerve pain).  Dispense: 1080 capsule; Refill: 1  - buPROPion (WELLBUTRIN SR) 150 mg Sustained-Release tablet; Take 1 pill 3 times daily for Depression  Dispense: 270 tablet; Refill: 3  - hydrOXYzine pamoate (VISTARIL) 25 mg capsule; Take 1-2 capsules by mouth every 6 hours if needed for Anxiety.  Dispense: 120 capsule; Refill: 3    2. Arthritis of carpometacarpal (CMC) joints of both thumbs    - gabapentin (NEURONTIN) 100 mg capsule; Take 1-3 capsules by mouth 4 times daily if needed (- for burning/shooting nerve pain).  Dispense: 1080 capsule; Refill: 1      -- Try to avoid Carbohydrates as much as possible -- breads, pasta, baked goods, cakes.   These are turned to sugar in one metabolic conversion, cause insulin secretion and increased fat deposition / weight gain.        Return as needed for follow-up with Dr. Johnston.    Clinic : [phone]  Appointment line: [phone]

## 2017-12-30 NOTE — NURSING NOTE
Patient Information     Patient Name MRN Sex Shelton Benson [id number] Male 1970      Nursing Note by Reanna Cedeno at 10/4/2017 12:40 PM     Author:  Reanna Cedeno Service:  (none) Author Type:  (none)     Filed:  10/4/2017  1:03 PM Encounter Date:  10/4/2017 Status:  Signed     :  Reanna Cedeno            Previous A1C is not at goal of <8  HEMOGLOBIN A1C MONITORING (POCT) (%)    Date Value   2017 8.8 (H)     Urine microalbumin:creatine: 1.93  Foot exam unknown  Eye exam 2017  Patient is not a current smoker  Patient is on a daily aspirin  Patient is on a Statin.  Blood pressure today of  is at the goal of <139/89 for diabetics.    Reanna Cedeno LPN..............10/4/2017 12:41 PM

## 2017-12-30 NOTE — NURSING NOTE
Patient Information     Patient Name MRN Sex Shelton Benson [id number] Male 1970      Nursing Note by Reanna Cedeno at 10/10/2017  9:40 AM     Author:  Reanna Cedeno Service:  (none) Author Type:  (none)     Filed:  10/10/2017 10:06 AM Encounter Date:  10/10/2017 Status:  Signed     :  Reanna Cedeno            Patient presents to the clinic for medication follow up.    Previous A1C is not at goal of <8      HEMOGLOBIN A1C MONITORING (POCT) (%)    Date Value   2017 8.8 (H)      Urine microalbumin:creatine: 1.93  Foot exam unknown  Eye exam 2017  Patient is not a current smoker  Patient is on a daily aspirin  Patient is on a Statin.  Blood pressure today of  is not at the goal of <139/89 for diabetics.     Reanna Cedeno LPN..............10/4/2017 12:41 PM

## 2017-12-30 NOTE — NURSING NOTE
Patient Information     Patient Name MRN Sex Shelton Benson [id number] Male 1970      Nursing Note by Reanna Cedeno at 2017 11:00 AM     Author:  Reanna Cedeno Service:  (none) Author Type:  (none)     Filed:  2017 11:35 AM Encounter Date:  2017 Status:  Signed     :  Reanna Cedeno            Patient presents to the clinic for diabetes management.  Patient express concerns about abdominal discomfort.  Last eye exam was 2017.      Reanna Cedeno LPN        2017 11:27 AM

## 2017-12-30 NOTE — NURSING NOTE
Patient Information     Patient Name MRN Sex Shelton Benson [id number] Male 1970      Nursing Note by Reanna Cedeno at 2017  1:20 PM     Author:  Reanna Cedeno Service:  (none) Author Type:  (none)     Filed:  2017  1:42 PM Encounter Date:  2017 Status:  Signed     :  Reanna Cedeno            Patient presents to the clinic for medication management.    Previous A1C is not at goal of <8  HEMOGLOBIN A1C MONITORING (POCT) (%)    Date Value   2017 8.8 (H)     Urine microalbumin:creatine: 1.93  Foot exam unknown  Eye exam 2017    Patient is not a current smoker  Patient is on a daily aspirin  Patient is on a Statin.  Blood pressure today of   is at the goal of <139/89 for diabetics.    Reanna Cedeno LPN..............2017 1:21 PM

## 2018-01-01 NOTE — TELEPHONE ENCOUNTER
Patient Information     Patient Name MRN Sex Sheltno Benson [id number] Male 1970      Telephone Encounter by Scott Johnston MD at 2017 10:13 AM     Author:  Scott Johnston MD Service:  (none) Author Type:  Physician     Filed:  2017 10:14 AM Encounter Date:  2017 Status:  Signed     :  Scott Johnston MD (Physician)            Continue weaning off effexor.     Continue wellbutrin.     Start clonidine to help with withdrawal symptoms.     Scott Johnston MD          Passed

## 2018-01-02 ENCOUNTER — AMBULATORY - GICH (OUTPATIENT)
Dept: SCHEDULING | Facility: OTHER | Age: 48
End: 2018-01-02

## 2018-01-02 NOTE — TELEPHONE ENCOUNTER
Patient Information     Patient Name MRN Shelton Lopez [id number] Male 1970      Telephone Encounter by Reanna Cedeno at 1/10/2017  8:41 AM     Author:  Reanna Cedeno Service:  (none) Author Type:  (none)     Filed:  1/10/2017  8:41 AM Encounter Date:  1/10/2017 Status:  Signed     :  Reanna Cedeno            Prior Authorization completed for Celecoxib and faxed to [facility] pharmacy at this time.    Reanna Cedeno LPN        1/10/2017 8:41 AM

## 2018-01-03 NOTE — TELEPHONE ENCOUNTER
Patient Information     Patient Name MRN Shelton Lopez [id number] Male 1970      Telephone Encounter by Reanna Cedeno at 1/10/2017  9:48 AM     Author:  Reanna Cedeno Service:  (none) Author Type:  (none)     Filed:  1/10/2017 10:04 AM Encounter Date:  1/10/2017 Status:  Signed     :  Reanna Cedeno            Prior Authorization was approved 2016 for a year per [facility] staff.    Reanna Cedeno LPN        1/10/2017 9:48 AM

## 2018-01-03 NOTE — TELEPHONE ENCOUNTER
Patient Information     Patient Name MRN Sex Shelton Benson [id number] Male 1970      Telephone Encounter by aHyes Lion RN at 3/1/2017  3:30 PM     Author:  Hayes Lion RN Service:  (none) Author Type:  NURS- Registered Nurse     Filed:  3/1/2017  3:39 PM Encounter Date:  3/1/2017 Status:  Signed     :  Hayes Lion RN (NURS- Registered Nurse)              Patient was called today regarding elevated Hemoglobin A1c -   HEMOGLOBIN A1C MONITORING (POCT) (%)    Date Value   2016 9.2 (H)    . Per [place] [employer], it is recommended that patient complete a diabetic follow-up visit. Patient will call back and schedule an appointment in April.    Patient wasn t offered information regarding smoking cessation at this time, as the line went quiet.   Materials will be mailed out if desired and appropriate for this patient.  HAYES LION RN............. 3/1/2017 3:37 PM '

## 2018-01-03 NOTE — TELEPHONE ENCOUNTER
Patient Information     Patient Name MRN Sex Shelton Benson [id number] Male 1970      Telephone Encounter by Reanna Cedeno at 2017  2:25 PM     Author:  Reanna Cedeno Service:  (none) Author Type:  (none)     Filed:  2017  2:26 PM Encounter Date:  1/10/2017 Status:  Signed     :  Reanna Cedeno            [facility] nurse Yohan states that Genia is out at this time.  Patient was looked up and nurse indicates that he is not restricted so he should be ablet o get Celebrex at his pharmacy of chose.  Yohan will contact [facility] at this time to get patient's prescription filled.    Reanna Cedeno LPN        2017 2:26 PM

## 2018-01-03 NOTE — TELEPHONE ENCOUNTER
Patient Information     Patient Name MRN Shelton Lopez [id number] Male 1970      Telephone Encounter by Reanna Cedeno at 1/10/2017 11:46 AM     Author:  Reanna Cedeno Service:  (none) Author Type:  (none)     Filed:  1/10/2017 11:50 AM Encounter Date:  1/10/2017 Status:  Signed     :  Reanna Cedeno            Left message for Genia at [facility] to return call to get Prior Authorization pharmacy needs to be changed to [facility] instead of [facility].    Reanna Cedeno LPN        1/10/2017 11:47 AM

## 2018-01-03 NOTE — TELEPHONE ENCOUNTER
Patient Information     Patient Name MRN Shelton Lopez [id number] Male 1970      Telephone Encounter by Reanna Cedeno at 1/10/2017 11:46 AM     Author:  Reanna Cedeno Service:  (none) Author Type:  (none)     Filed:  1/10/2017 11:50 AM Encounter Date:  1/10/2017 Status:  Signed     :  Reanna Cedeno            Pharmacist states that medication is still not approved.    Reanna Cedeno LPN        1/10/2017 11:46 AM

## 2018-01-04 NOTE — PATIENT INSTRUCTIONS
"Patient Information     Patient Name MRN Shelton Lopez [id number] Male 1970      Patient Instructions by Scott Johnston MD at 2017  2:10 PM     Author:  Scott Johnston MD  Service:  (none) Author Type:  Physician     Filed:  2017 11:17 AM  Encounter Date:  2017 Status:  Addendum     :  Scott Johnston MD (Physician)        Related Notes: Original Note by Scott Johnston MD (Physician) filed at 2017 11:15 AM            Check labs today.    Lumbar MRI ordered.  - they will call with date/time of appointment.    -- back injection ordered  - they will call with date/time of appointment.      Basilar thumb joint, bilateral hands, steroid injections ordered with the interventional radiologist  - they will call with date/time of appointment.        Return for Diabetes labs and clinic follow-up appointment every 3 to 4 months.  --- (Go for about 91 to 100 days)    Schedule lab only appointment --- A few days AFTER: 17     Schedule clinic appointment with Dr. Johnston -- Same day as labs, or 1-2 days later.     Insurance companies are now grading you and I on your blood sugar control -- Goal is to get your A1c down to 7.9% or lower and NO Smoking!    -- Medicare and most insurance companies, will only cover Hemoglobin A1c labs to be rechecked every 91+ days.      HEMOGLOBIN A1C MONITORING (POCT) (%)    Date Value   2016 9.2 (H)        Next follow-up appointment with Dr. Johnston should be scheduled:  -- Approximately a few days AFTER: 17        -- Congratulations on the 10-11 pound weight loss since 2016. Keep up the hard work!!    Your Body mass index (BMI) is:  Estimated body mass index is 48.76 kg/(m^2) as calculated from the following:    Height as of this encounter: 1.842 m (6' 0.5\").    Weight as of this encounter: 165.3 kg (364 lb 8 oz).   (BMI ranges: Normal 18.5 - 25, Overweight 25 - 30, Obesity greater than 30)     Facts " about losing weight:   -- Overweight and Obesity increase your risk for developing diabetes, high blood pressure and stroke, and shorten your life.   -- 90% of weight loss comes from dietary changes, only 10% from exercise   -- For every 1 pound of of weight loss -- this is equal to about 8 to 10 pounds of weight off of your knees.   -- there are about 3500 calories in 1 pound of body weight.     -- Goal Caloric intake -- 1200 to 1500 daily.     Get out and walk for 10 to 15 minutes after each meal -- this significantly lowers the spike in blood sugar after eating.     What have successful people done to lose large amounts of weight, and keep it off?   -- Used both diet and exercise to lose weight   -- Ate a healthy breakfast every day   -- Exercised an hour per day   -- Watched less than 10 hours of TV per week   -- Weighed themselves weekly   -- Drink a large glass of water 10-15 minutes before your meals.   -- Use smaller dinner plates and don't go back for seconds.     What should I do?   -- Work on 5-10% weight loss   -- Focus on a few healthy dietary changes   -- Eat more fresh fruits and vegetables, and fewer carbohydrates (http://myplate.gov/)   -- Exercise every day   -- Weigh yourself once a week    Weight Management   Weight Watchers     Meets Mondays 9:30, 11:45, or 5:30    [facility], [address], [place]     Contact Elizabeth [phone] [email or web address]  Weight Watchers [email or web address]    -- Consider My Fitness Pal on your smart phone  [email or web address].com/      I would recommend a focus on weight loss and increased exercise with a goal of 30 minutes of exercise at least 5 times per week in order to help prevent worsening of your blood sugar control.     Keep working to try obtain/maintain healthy weight, weight loss, healthy diet and regular exercise.      -- Start tanzeum, once weekly injection.  30 mg weekly for total of 4 weeks, then increase to 50 mg weekly.    Continue  blood sugar checks once daily.    Blood sugar checks at home  (check 1 times daily, randomly) - check before breakfast, before lunch, before supper and before bedtime and record   -- bring these with you to your next appointment.        -- Increase Effexor to 150 mg daily.  Prescription sent to pharmacy.      Tanzeum, Trulicity, or Bydureon - Once weekly  Victoza - Once daily  Byetta - Twice daily.      - These are Injectable Glucagon-like peptide-1 (GLP-1) receptor agonist medications, used for diabetes management in conjunction with treatment of obesity.    -- check on cost / pricing / coverage for these.

## 2018-01-04 NOTE — TELEPHONE ENCOUNTER
Patient Information     Patient Name MRN Sex Shelton Benson [id number] Male 1970      Telephone Encounter by Kimberly Kinsey at 2017 11:46 AM     Author:  Kimberly Kinsey Service:  (none) Author Type:  (none)     Filed:  2017 11:46 AM Encounter Date:  2017 Status:  Signed     :  Kimberly Kinsey            Patient EC notified, transferred to [facility].  Kimberly Kinsey LPN .............2017  11:46 AM

## 2018-01-04 NOTE — TELEPHONE ENCOUNTER
Patient Information     Patient Name MRN Shelton Lopez [id number] Male 1970      Telephone Encounter by Estelle Velez at 2017 10:24 AM     Author:  Estelle Velez Service:  (none) Author Type:  (none)     Filed:  2017 10:24 AM Encounter Date:  2017 Status:  Signed     :  Estelle Velez            Left message to call back  ....................  2017   10:24 AM

## 2018-01-04 NOTE — TELEPHONE ENCOUNTER
Patient Information     Patient Name MRN Sex hSelton Benson [id number] Male 1970      Telephone Encounter by Scott Johnston MD at 2017  2:56 PM     Author:  Scott Johnston MD Service:  (none) Author Type:  Physician     Filed:  2017  2:57 PM Encounter Date:  2017 Status:  Signed     :  Scott Johnston MD (Physician)            Needs Diabetic clinic appointment. 40 minutes, and we can review his other issues at that time.      Scott Johnston MD

## 2018-01-04 NOTE — NURSING NOTE
Patient Information     Patient Name MRN Sex Shelton Benson [id number] Male 1970      Nursing Note by Reanna Cedeno at 2017  2:10 PM     Author:  Reanna Cedeno Service:  (none) Author Type:  (none)     Filed:  2017 10:58 AM Encounter Date:  2017 Status:  Signed     :  Reanna Cedeno            Patient presents to the clinic for bilateral hand and back pain that has present for years.      Reanna Cedeno LPN        2017 10:30 AM

## 2018-01-04 NOTE — PROGRESS NOTES
Patient Information     Patient Name MRN Shelton Lopez [id number] Male 1970      Progress Notes by Scott Johnston MD at 2017  2:10 PM     Author:  Scott Johnston MD Service:  (none) Author Type:  Physician     Filed:  2017  1:04 PM Encounter Date:  2017 Status:  Signed     :  Scott Johnston MD (Physician)            Nursing Notes:   Reanna Cedeno  2017 10:58 AM  Signed  Patient presents to the clinic for bilateral hand and back pain that has present for years.      Reanna Cedeno LPN        2017 10:30 AM    Shelton Puentes presents to clinic today for:   Chief Complaint    Patient presents with      Back Pain     HPI: Mr. Puentes is a 46 y.o. male who presents today for evaluation of above.     (E11.65) Uncontrolled type 2 diabetes mellitus without complication, without long-term current use of insulin (HC)  (primary encounter diagnosis)  (E78.2) Mixed hyperlipidemia  (M54.42,  G89.29) Chronic midline low back pain with left-sided sciatica  (M15.0) Primary osteoarthritis involving multiple joints  (R73.9) Steroid-induced hyperglycemia  (F41.9,  F32.9) Anxiety and depression  (E66.01,  Z68.42) Morbid obesity with BMI of 45.0-49.9, adult (HC)     Uncontrolled type 2 diabetes.  Due for labs today.  We had started him on metformin, he only uses it for a short time and then stopped.  States that he got foggy or groggy with that but he is not sure if it was the metformin or Robaxin.  Still has been checking his blood sugars once daily.  States that they range anywhere from 171 up to 328.  He is not taking anything for his control at this time.  He is morbidly obese but had lost about 11 pounds in the last 6 months.  He wants some help with this.  To help accomplish both we looked at the GLP-1 agonists.  He would like to start Tanzeum.  Prescription sent to pharmacy.  We discussed risks and benefits of medication.  He would like to  proceed.    Hyperlipidemia, currently taking Lipitor 40 mg daily.  Recheck labs.    Chronic low back pain with left-sided sciatica, needs updated MRI.  This is likely open scan MRI due to his obesity.  He would like a repeat back injection ordered.  His last one was about 2 years ago.    Multiple joint arthritis, has arthritis in the basilar thumb joints bilaterally.  Would like these reinjected again.  Orders placed.    Morbid obesity, discussed need for reduce oral caloric intake.  Regular exercise.  BMI has actually dropped down to the 48 range.  Was previously greater than 50.    Chronic anxiety, increase Effexor.  States still having a lot of issues.    Mr. Puentes's Body mass index is 48.76 kg/(m^2). This is out of the normal range for a 46 y.o. Normal range for ages 18-64 is between 18.5 and 24.9; normal range for ages 65+ is 23-30. To lose weight we reviewed risks and benefits of appropriate options such as diet, exercise, and medications. Patient's strategy will be  self-directed nutrition plan and self-directed exercise program   BP Readings from Last 1 Encounters:04/21/17 : 134/86  Mr. Puentes's blood pressure is out of the normal range for adults. Per JNC-8 guidelines normal adult blood pressure is < 120/80, pre-hypertensive is between 120/80 and 139/89, and hypertension is 140/90 or greater. Risks of hypertension were discussed. Patient's strategy will be weight loss, increased activity and reduced salt intake    Functional Capacity: about 4 METS.   Reports that he can climb a flight of stairs without any chest pain/heaviness or shortness of breath.   Patient reports no current symptoms of fevers, chills, nausea/vomiting.   No cough. No shortness of breath.   No change in bowel/bladder habits. No melena, hematochezia. No Hematuria.   No rashes. No palpitations.  No orthopnea/paroxysmal nocturnal dyspnea   No vision or hearing issues.  + chronic anxiety.   No bruising.     ASHVIN:  ASHVIN-7 ANXIETY  SCREENING 8/12/2016 4/21/2017   ASHVIN date (doc flow) 8/12/2016 4/21/2017   Nervous, anxious 3 0   Cannot stop worrying 3 0   Worry about different things 3 0   Cannot relax 3 0   Feeling restless 2 0   Easily annoyed/irritated 3 0   Afraid of awful event 3 0   Score 20 0   Severity severe anxiety none       PHQ9:  PHQ Depression Screening 11/1/2016 4/21/2017   Date of PHQ exam (doc flow) 11/1/2016 4/21/2017   1. Lack of interest/pleasure 3 - Nearly every day 0 - Not at all   2. Feeling down/depressed 2 - More than half the days 0 - Not at all   PHQ-2 TOTAL SCORE 5 0   3. Trouble sleeping 2 - More than half the days 0 - Not at all   4. Decreased energy 3 - Nearly every day 0 - Not at all   5. Appetite change 3 - Nearly every day 0 - Not at all   6. Feelings of failure 2 - More than half the days 0 - Not at all   7. Trouble concentrating 3 - Nearly every day 0 - Not at all   8. Activity level 2 - More than half the days 0 - Not at all   9. Hurting yourself 1 - Several days 0 - Not at all   PHQ-9 TOTAL SCORE 21 0   PHQ-9 Severity Level severe none   Functional Impairment extremely difficult not difficult at all        I have personally reviewed the past medical history, past surgical history, medications, allergies, family and social history as listed below, on 4/21/2017.    Patient Active Problem List       Diagnosis  Date Noted     Steroid-induced hyperglycemia  11/01/2016     Microcytic red blood cells  11/01/2016     Anxiety and depression  11/01/2016     GWEN on CPAP  11/01/2016     Mixed hyperlipidemia  11/01/2016     Morbid obesity with BMI of 45.0-49.9, adult (HC)  11/01/2016     Chronic pain of both knees  11/01/2016     Chronic midline low back pain with left-sided sciatica  11/01/2016     Cyst of left kidney  11/01/2016     Achilles tendonitis, bilateral  07/22/2016     Health care maintenance  07/01/2016     Colonoscopy: Age 50  ITALO/PSA: Given family history of early prostate disease, would check periodically  to monitor for changes; last in 08/2015, recheck in 1-3 years  AAA screen: Not indicated  Immunizations: UTD on other vaccines.   Lipids/Annual Exam: Done 07/2016, repeat as needed for cholesterol management  Hepatitis C screen: not indicated                Vitamin D deficiency  09/23/2015     Family hx of prostate cancer  08/17/2015     Osteoarthritis of multiple joints  03/05/2015     Past Medical History:     Diagnosis  Date     Achilles tendonitis, bilateral 7/22/2016     Chronic back pain      Depression with anxiety      Obstructive sleep apnea     using nasal pillows; sleeping 8 hours      Osteoarthritis of multiple joints     has had injections       Past Surgical History:      Procedure  Laterality Date     TONSILLECTOMY  as a child     Current Outpatient Prescriptions       Medication  Sig Dispense Refill     acetaminophen (TYLENOL EXTRA STRGTH) 500 mg tablet Take 2 tablets by mouth every 6 hours if needed. Max acetaminophen dose: 4000mg in 24 hrs. 720 tablet 1     acetaminophen SR (TYLENOL 8 HOUR) 650 mg Extended-Release tablet Take 1 tablet by mouth up to 4 times daily as needed for pain 100 tablet 3     albiglutide (TANZEUM) 30 mg/0.5 mL injection Inject 30 mg subcutaneous once weekly. 4 Each 0     albiglutide (TANZEUM) 50 mg/0.5 mL injection Inject 50 mg subcutaneous once weekly. - Start 05/19/17 - after completion of 30 mg pen 4 Each 11     aspirin (ECOTRIN) 81 mg enteric coated tablet Take 1 tablet by mouth once daily with a meal. -- Do not start at this time, pending workup of stomach issues  0     atorvastatin (LIPITOR) 40 mg tablet Take 1 tablet by mouth once daily. 30 tablet 11     blood sugar diagnostic strip Dispense item covered by pt ins. E11.65 NIDDM type II, uncontrolled - Test 2 times/day 200 Each 3     blood-glucose meter Dispense item covered by pt ins. E11.65 NIDDM type II, uncontrolled - Test 1 time/day 1 Device 0     celecoxib (CELEBREX) 200 mg capsule Take 1 capsule by mouth 2 times  "daily with meals. 180 capsule 3     cholecalciferol (VITAMIN D3) 5,000 unit capsule Take 1 capsule by mouth once daily. For Vitamin D Deficiency 100 capsule 3     cholecalciferol (VITAMIN D3) 5,000 unit capsule Take 1 capsule by mouth once daily. For Vitamin D Deficiency 100 capsule 3     EFFEXOR XR 75 mg cp24 Extended-Release capsule Take 2 capsules by mouth once daily with a meal. -- Brand Name only -- Dose Increase 4/21/2017 180 capsule 3     HAND SUPPORT misc As directed. Arthritis glove, bilateral. Wear on both hands as needed for pain. Dx: [facility] [place] 716.94 (M19.90). 2 Each 0     Insulin Needles, Disposable, (KO PEN NEEDLE) 32 gauge x 5/32\" As directed. For administering Tanzeum at home. Dx: E11.65 30 Each 3     lancets Dispense item covered by pt ins. E11.65 NIDDM type II, uncontrolled - Test 2 times/day 200 Each 3     lidocaine 5 % oint topical ointment Apply  topically to affected area(s) 3 times daily if needed for Other (Specify). 50 g 3     medication order composer OTC vitamin B12 3 tablets daily, vitamin D 1 tablet daily and omega capsule daily.  Patient is unsure of dosages at this time.       methocarbamol (ROBAXIN) 750 mg tablet Take 1 tablet by mouth 3 times daily. 90 tablet 11     Allergies      Allergen   Reactions     Metformin  Other - Describe In Comment Field     Felt very foggy / groggy after taking, when in combination with Robaxin.       Family History      Problem  Relation Age of Onset     Psychiatric illness Father      Psychiatric illness Paternal Grandfather      Psychiatric illness Paternal Uncle      Family Status     Relation  Status     Father Alive    prostate cancer 60's      Mother Alive     Brother Alive    brain tumor age 15; recurrent issues with this      Paternal Grandfather      Paternal Uncle      Social History     Social History        Marital status:       Spouse name: N/A     Number of children:  N/A     Years of education:  N/A     Social History Main " "Topics          Smoking status:   Never Smoker      Smokeless tobacco:   Never Used      Alcohol use   0.0 oz/week     0 Standard drinks or equivalent per week        Comment: rarely       Drug use:   No      Sexual activity:   Yes      Partners:  Female      Other Topics  Concern     Not on file      Social History Narrative     Originally from [place]; moved back to [facility] in 2012 after living there for another 3-4 years with wife; , no children.  Laid off in winter 2015 from [employer] business.  Going to school in [place] for [employer] certification.         Pertinent ROS was performed and was negative as noted in HPI above.     EXAM:   Vitals:     04/21/17 1031   BP: 134/86   Pulse: 76   Temp: 99.3  F (37.4  C)   TempSrc: Tympanic   Weight: (!) 165.3 kg (364 lb 8 oz)   Height: 1.842 m (6' 0.5\")     BP Readings from Last 3 Encounters:    04/21/17 134/86   11/01/16 120/80   10/21/16 110/76     Wt Readings from Last 3 Encounters:    04/21/17 (!) 165.3 kg (364 lb 8 oz)   11/01/16 (!) 170.2 kg (375 lb 2 oz)   08/12/16 (!) 166.1 kg (366 lb 2 oz)     Estimated body mass index is 48.76 kg/(m^2) as calculated from the following:    Height as of this encounter: 1.842 m (6' 0.5\").    Weight as of this encounter: 165.3 kg (364 lb 8 oz).     EXAM:  Constitutional: uncomfortable with pain, well groomed / good hygiene, casual dress  ENT: Normocephalic, Atraumatic, Thyroid without nodules or tenderness   Nose/Mouth: Oral pharynx without erythema or exudates, Nose is patent bilaterally, no rhinorrhea and Dental hygeine adequate   Eyes:  Extraocular muscles intact, Sclera non-icteric, Conjunctiva without erythema  Lymphatic Exam: Non-palpable nodes in neck, clavicular regions  Pulmonary: Lungs are clear to auscultation bilaterally, without wheezes or crackles  Cardiovascular Exam: regular rate and rhythm, 1+ pedal edema present, no murmur, click, rub or gallop appreciated  Gastrointestinal Exam: Obese  Integument: No abnormal " rashes, sores, or ulcerations noted  Neurologic Exam: CN 3-12 grossly intact   Musculoskeletal Exam: Multiple joint arthritis noted.  Tender to palpation over bilateral thumb basilar joints.  Walks with slight limp.  Moves slowly due to back pain.  Psychiatric Exam: Awake and Alert, Affect and mood appropriate  Speech is fluent, Thought process is normal    INVESTIGATIONS:  Results for orders placed or performed in visit on 04/21/17      CBC W PLT NO DIFF      Result  Value Ref Range    WHITE BLOOD COUNT         4.6 4.5 - 11.0 thou/cu mm    RED BLOOD COUNT           6.16 (H) 4.30 - 5.90 mil/cu mm    HEMOGLOBIN                16.5 13.5 - 17.5 g/dL    HEMATOCRIT                50.1 37.0 - 53.0 %    MCV                       81 80 - 100 fL    MCH                       26.7 26.0 - 34.0 pg    MCHC                      32.9 32.0 - 36.0 g/dL    RDW                       12.9 11.5 - 15.5 %    PLATELET COUNT            141 140 - 440 thou/cu mm    MPV                       9.3 6.5 - 11.0 fL   COMP METABOLIC PANEL      Result  Value Ref Range    SODIUM 136 133 - 143 mmol/L    POTASSIUM 4.0 3.5 - 5.1 mmol/L    CHLORIDE 105 98 - 107 mmol/L    CO2,TOTAL 22 21 - 31 mmol/L    ANION GAP 9 5 - 18                    GLUCOSE 303 (H) 70 - 105 mg/dL    CALCIUM 9.3 8.6 - 10.3 mg/dL    BUN 12 7 - 25 mg/dL    CREATININE 0.90 0.70 - 1.30 mg/dL    BUN/CREAT RATIO           13                    GFR if African American >60 >60 ml/min/1.73m2    GFR if not African American >60 >60 ml/min/1.73m2    ALBUMIN 4.0 3.5 - 5.7 g/dL    PROTEIN,TOTAL 6.1 (L) 6.4 - 8.9 g/dL    GLOBULIN                  2.1 2.0 - 3.7 g/dL    A/G RATIO 1.9 1.0 - 2.0                    BILIRUBIN,TOTAL 1.0 0.3 - 1.0 mg/dL    ALK PHOSPHATASE 41 34 - 104 IU/L    ALT (SGPT) 49 7 - 52 IU/L    AST (SGOT) 37 13 - 39 IU/L   HEMOGLOBIN A1C MONITORING (POCT)      Result  Value Ref Range    HEMOGLOBIN A1C MONITORING (POCT) 10.2 (H) 4.0 - 6.2 %    ESTIMATED AVERAGE GLUCOSE  246 mg/dL  "  LIPID PANEL      Result  Value Ref Range    CHOLESTEROL,TOTAL 235 (H) <200 mg/dL    TRIGLYCERIDES 349 (H) <150 mg/dL    HDL CHOLESTEROL 37 23 - 92 mg/dL    NON-HDL CHOLESTEROL 198 (H) <145 mg/dl    CHOL/HDL RATIO            6.35 (H) <4.50                    LDL CHOLESTEROL 128 (H) <100 mg/dL    PATIENT STATUS            NOT GIVEN                       ASSESSMENT AND PLAN:  Shelton was seen today for back pain.    Diagnoses and all orders for this visit:    Uncontrolled type 2 diabetes mellitus without complication, without long-term current use of insulin (HC)  -     CBC W PLT NO DIFF  -     COMP METABOLIC PANEL  -     HEMOGLOBIN A1C MONITORING (POCT)  -     albiglutide (TANZEUM) 30 mg/0.5 mL injection; Inject 30 mg subcutaneous once weekly.  -     albiglutide (TANZEUM) 50 mg/0.5 mL injection; Inject 50 mg subcutaneous once weekly. - Start 05/19/17 - after completion of 30 mg pen  -     Insulin Needles, Disposable, (KO PEN NEEDLE) 32 gauge x 5/32\"; As directed. For administering Tanzeum at home. Dx: E11.65    Mixed hyperlipidemia  -     LIPID PANEL; Standing  -     LIPID PANEL    Chronic midline low back pain with left-sided sciatica  -     MR SPINE LUMBAR WO; Future  -     XR INJ EPIDURAL INTERLAMINAR LUMBAR; Future    Primary osteoarthritis involving multiple joints  -     XR INJ OR ASP FINGER JOINT LEFT; Future  -     XR INJ OR ASP FINGER JOINT RIGHT; Future    Steroid-induced hyperglycemia    Anxiety and depression  -     EFFEXOR XR 75 mg cp24 Extended-Release capsule; Take 2 capsules by mouth once daily with a meal. -- Brand Name only -- Dose Increase 4/21/2017    Morbid obesity with BMI of 45.0-49.9, adult (HC)      lab results and schedule of future lab studies reviewed with patient, reviewed diet, exercise and weight control, recommended sodium restriction, cardiovascular risk and specific lipid/LDL goals reviewed, specific diabetic recommendations referral to Diabetic Education department, low " cholesterol diet, weight control and daily exercise discussed, home glucose monitoring emphasized, foot care discussed and Podiatry visits discussed, annual eye examinations at Ophthalmology discussed, glycohemoglobin and other lab monitoring discussed and long term diabetic complications discussed, use of aspirin to prevent MI and TIA's discussed    -- Expected clinical course discussed   -- Medications and their side effects discussed    25 minutes spent in face-to-face interaction with patient (separate from separately billed procedures) with greater than 50% spent in counseling and care coordination of listed medical problems.      The 10-year ASCVD risk score (Madburymagalie COLLADO Jr, et al., 2013) is: 8.7%    Values used to calculate the score:      Age: 46 years      Sex: Male      Is Non- : No      Diabetic: Yes      Tobacco smoker: No      Systolic Blood Pressure: 134 mmHg      Is BP treated: No      HDL Cholesterol: 37 mg/dL      Total Cholesterol: 235 mg/dL    Return in about 3 months (around 7/21/2017) for -- labs in 91+ days with Diabetes clinic appointment with Dr. Johnston 1-2 days later..    Patient Instructions     Check labs today.    Lumbar MRI ordered.  - they will call with date/time of appointment.    -- back injection ordered  - they will call with date/time of appointment.      Basilar thumb joint, bilateral hands, steroid injections ordered with the interventional radiologist  - they will call with date/time of appointment.        Return for Diabetes labs and clinic follow-up appointment every 3 to 4 months.  --- (Go for about 91 to 100 days)    Schedule lab only appointment --- A few days AFTER: 07/20/17     Schedule clinic appointment with Dr. Johnston -- Same day as labs, or 1-2 days later.     Insurance companies are now grading you and I on your blood sugar control -- Goal is to get your A1c down to 7.9% or lower and NO Smoking!    -- Medicare and most insurance companies, will  "only cover Hemoglobin A1c labs to be rechecked every 91+ days.      HEMOGLOBIN A1C MONITORING (POCT) (%)    Date Value   11/08/2016 9.2 (H)        Next follow-up appointment with Dr. Johnston should be scheduled:  -- Approximately a few days AFTER: 07/20/17        -- Congratulations on the 10-11 pound weight loss since November 2016. Keep up the hard work!!    Your Body mass index (BMI) is:  Estimated body mass index is 48.76 kg/(m^2) as calculated from the following:    Height as of this encounter: 1.842 m (6' 0.5\").    Weight as of this encounter: 165.3 kg (364 lb 8 oz).   (BMI ranges: Normal 18.5 - 25, Overweight 25 - 30, Obesity greater than 30)     Facts about losing weight:   -- Overweight and Obesity increase your risk for developing diabetes, high blood pressure and stroke, and shorten your life.   -- 90% of weight loss comes from dietary changes, only 10% from exercise   -- For every 1 pound of of weight loss -- this is equal to about 8 to 10 pounds of weight off of your knees.   -- there are about 3500 calories in 1 pound of body weight.     -- Goal Caloric intake -- 1200 to 1500 daily.     Get out and walk for 10 to 15 minutes after each meal -- this significantly lowers the spike in blood sugar after eating.     What have successful people done to lose large amounts of weight, and keep it off?   -- Used both diet and exercise to lose weight   -- Ate a healthy breakfast every day   -- Exercised an hour per day   -- Watched less than 10 hours of TV per week   -- Weighed themselves weekly   -- Drink a large glass of water 10-15 minutes before your meals.   -- Use smaller dinner plates and don't go back for seconds.     What should I do?   -- Work on 5-10% weight loss   -- Focus on a few healthy dietary changes   -- Eat more fresh fruits and vegetables, and fewer carbohydrates (http://myplate.gov/)   -- Exercise every day   -- Weigh yourself once a week    Weight Management   Weight Watchers     Meets " Mondays 9:30, 11:45, or 5:30    [facility], [address], [place], [place]     Contact Elizabeth [phone] [email or web address]  Weight Watchers [email or web address]    -- Consider My Fitness Pal on your smart phone  [email or web address].[employer]/      I would recommend a focus on weight loss and increased exercise with a goal of 30 minutes of exercise at least 5 times per week in order to help prevent worsening of your blood sugar control.     Keep working to try obtain/maintain healthy weight, weight loss, healthy diet and regular exercise.      -- Start tanzeum, once weekly injection.  30 mg weekly for total of 4 weeks, then increase to 50 mg weekly.    Continue blood sugar checks once daily.    Blood sugar checks at home  (check 1 times daily, randomly) - check before breakfast, before lunch, before supper and before bedtime and record   -- bring these with you to your next appointment.        -- Increase Effexor to 150 mg daily.  Prescription sent to pharmacy.      Tanzeum, Trulicity, or Bydureon - Once weekly  Victoza - Once daily  Byetta - Twice daily.      - These are Injectable Glucagon-like peptide-1 (GLP-1) receptor agonist medications, used for diabetes management in conjunction with treatment of obesity.    -- check on cost / pricing / coverage for these.         Scott Johnston MD

## 2018-01-04 NOTE — TELEPHONE ENCOUNTER
Patient Information     Patient Name MRN Sex Shelton Benson [id number] Male 1970      Telephone Encounter by Karishma Souza at 2017 12:53 PM     Author:  Karishma Souza Service:  (none) Author Type:  (none)     Filed:  2017 12:53 PM Encounter Date:  2017 Status:  Signed     :  Karishma Souza            Given last A1 c per doctor order .Karishma Souza LPN ....................2017  12:53 PM

## 2018-01-04 NOTE — TELEPHONE ENCOUNTER
Patient Information     Patient Name MRN Sex Shelton Benson [id number] Male 1970      Telephone Encounter by Mildred Hdz at 2017  9:31 AM     Author:  Mildred Hdz Service:  (none) Author Type:  (none)     Filed:  2017  9:37 AM Encounter Date:  2017 Status:  Signed     :  Mildred Hdz            Patient requested orders for Lt Hand injection and Lumbar Interlaminer injection. Patients Lumbar imaging is  and will need to be updated prior to Lumbar injection.    If questions please call.    Thank you,   Mildred Hdz ....................  2017   9:37 AM

## 2018-01-04 NOTE — TELEPHONE ENCOUNTER
"Patient Information     Patient Name MRN Sex Shelton Benson [id number] Male 1970      Telephone Encounter by Maddi Willingham at 2017 12:29 PM     Author:  Maddi Willingham Service:  (none) Author Type:  (none)     Filed:  2017 12:30 PM Encounter Date:  2017 Status:  Signed     :  Maddi Willingham            PATIENT NEEDS THE \"OPEN MRI\" OFFERED AT [facility] IN [place].         "

## 2018-01-04 NOTE — TELEPHONE ENCOUNTER
Patient Information     Patient Name MRN Sex Shelton Benson [id number] Male 1970      Telephone Encounter by Scott Johnston MD at 2017 10:18 AM     Author:  Scott Johnston MD Service:  (none) Author Type:  Physician     Filed:  2017 10:18 AM Encounter Date:  2017 Status:  Signed     :  Scott Johnston MD (Physician)            Needs Diabetic clinic appointment. 40 minutes, and we can review his other issues at that time.     Scott Johnston MD

## 2018-01-05 NOTE — PROGRESS NOTES
Patient Information     Patient Name MRN Sex     Shelton Puentes [id number] Male 1970      Progress Notes by Nell Crawford R.T. (ARRT) at 5/15/2017  9:58 AM     Author:  [facility]. [facility] Service:  (none) Author Type:  [employer] - Registered Radiologic Technologist     Filed:  5/15/2017  9:58 AM Date of Service:  5/15/2017  9:58 AM Status:  Signed     :  Nell [facility]T. [facility] ([facility] - Registered Radiologic Technologist)            [place] Protocol    A. Pre-procedure verification complete yes  1-relevant information / documentation available, reviewed and properly matched to the patient; 2-consent accurate and complete, 3-equipment and supplies available    B. Site marking complete Yes  Site marked if not in continuous attendance with patient    C. TIME OUT completed yes  Time Out was conducted just prior to starting procedure to verify the eight required elements: 1-patient identity, 2-consent accurate and complete, 3-position, 4-correct side/site marked (if applicable), 5-procedure, 6-relevant images / results properly labeled and displayed (if applicable), 7-antibiotics / irrigation fluids (if applicable), 8-safety precautions.

## 2018-01-05 NOTE — PROGRESS NOTES
Patient Information     Patient Name MRN Sex Shelton Benson [id number] Male 1970      Progress Notes by Nell Crawford R.T. (ARRT) at 5/15/2017  9:58 AM     Author:  Nell Crawford R.T. ([facility]) Service:  (none) Author Type:  [employer] - Registered Radiologic Technologist     Filed:  5/15/2017  9:58 AM Date of Service:  5/15/2017  9:58 AM Status:  Signed     :  Nell Crawford R.T. ([facility]) ([facility] - Registered Radiologic Technologist)            RECOVERY TIME  You may experience numbness and/or relief of your pain for up to 4-6 hours after the injection.  Your usual symptoms may return the night of the procedure and may possible be more severe than usual a day or two following.  Please keep track of your pain over the next several days and report how long the relief lasts to the doctor who referred you for this procedure.    The beneficial effects of the steroids usually require 2 to 3 days to take effect, buy may take as long as 5 to 7 days.  If there is no change in the pain, then investigation can be focused on other possible sources of your pain.  In either case, the information is useful to the doctor who referred you for this procedure.    POSSIBLE SIDE EFFECTS  Facial flushing (redness), occasional low grade fevers of 99.5F or less, hiccups, insomnia, headaches, increased heart rate, abdominal cramping, and/or a bloating feeling are side effects of the steroid medications and will go away 3 to 4 days after the injection.    Diabetic Patients  The steroids you have received may significantly increase your blood sugar levels.  Monitor your blood sugar level closely (4-6 times per day) for a period of 4 days or until your blood sugar level normalizes.  If your blood sugar level elevates significantly or you experience confusion, dizziness, sweating, please notify our primary physician and make him/her aware that you have received steroids.

## 2018-01-05 NOTE — TELEPHONE ENCOUNTER
Patient Information     Patient Name MRN Sex Shelton Benson [id number] Male 1970      Telephone Encounter by Reanna Cedeno at 2017  3:43 PM     Author:  Reanna Cedeno Service:  (none) Author Type:  (none)     Filed:  2017  3:44 PM Encounter Date:  2017 Status:  Signed     :  Reanna Cedeno            PA for tanzeum completed and faxed at this time.    Reanna Cedeno LPN        2017 3:43 PM

## 2018-01-05 NOTE — PROGRESS NOTES
Patient Information     Patient Name MRN Sex     Shelton Puentes [id number] Male 1970      [facility] at 5/15/2017  9:58 AM     Author:  Nell Crawford R.T. (ARRT) Service:  (none) Author Type:  [facility] - Registered Radiologic Technologist     Filed:  5/15/2017  9:58 AM Date of Service:  5/15/2017  9:58 AM Status:  Signed     :  Nell Crawford R.T. (ARRT) ([facility] - Registered Radiologic Technologist)            Falls Risk Criteria:    Age 65 and older or under age 4        Sensory deficits    Poor vision    Use of ambulatory aides    Impaired judgment    Unable to walk independently    Meets High Risk criteria for falls:  no

## 2018-01-18 ENCOUNTER — COMMUNICATION - GICH (OUTPATIENT)
Dept: INTERNAL MEDICINE | Facility: OTHER | Age: 48
End: 2018-01-18

## 2018-01-18 DIAGNOSIS — F41.8 OTHER SPECIFIED ANXIETY DISORDERS: ICD-10-CM

## 2018-01-23 ENCOUNTER — AMBULATORY - GICH (OUTPATIENT)
Dept: SCHEDULING | Facility: OTHER | Age: 48
End: 2018-01-23

## 2018-01-25 VITALS
WEIGHT: 315 LBS | BODY MASS INDEX: 48.71 KG/M2 | HEART RATE: 88 BPM | DIASTOLIC BLOOD PRESSURE: 86 MMHG | SYSTOLIC BLOOD PRESSURE: 148 MMHG

## 2018-01-25 VITALS
HEART RATE: 80 BPM | HEART RATE: 72 BPM | WEIGHT: 315 LBS | SYSTOLIC BLOOD PRESSURE: 146 MMHG | BODY MASS INDEX: 49.01 KG/M2 | WEIGHT: 315 LBS | DIASTOLIC BLOOD PRESSURE: 74 MMHG | SYSTOLIC BLOOD PRESSURE: 138 MMHG | DIASTOLIC BLOOD PRESSURE: 82 MMHG | BODY MASS INDEX: 47.89 KG/M2

## 2018-01-25 VITALS
WEIGHT: 315 LBS | SYSTOLIC BLOOD PRESSURE: 134 MMHG | TEMPERATURE: 99.3 F | HEART RATE: 76 BPM | HEIGHT: 73 IN | BODY MASS INDEX: 41.75 KG/M2 | DIASTOLIC BLOOD PRESSURE: 86 MMHG

## 2018-01-25 VITALS
DIASTOLIC BLOOD PRESSURE: 78 MMHG | SYSTOLIC BLOOD PRESSURE: 140 MMHG | HEART RATE: 78 BPM | BODY MASS INDEX: 48.32 KG/M2 | WEIGHT: 315 LBS

## 2018-01-30 ENCOUNTER — COMMUNICATION - GICH (OUTPATIENT)
Dept: INTERNAL MEDICINE | Facility: OTHER | Age: 48
End: 2018-01-30

## 2018-02-04 ASSESSMENT — PATIENT HEALTH QUESTIONNAIRE - PHQ9
SUM OF ALL RESPONSES TO PHQ QUESTIONS 1-9: 0

## 2018-02-04 ASSESSMENT — ANXIETY QUESTIONNAIRES
GAD7 TOTAL SCORE: 0

## 2018-02-09 VITALS
HEART RATE: 76 BPM | HEIGHT: 72 IN | DIASTOLIC BLOOD PRESSURE: 88 MMHG | WEIGHT: 315 LBS | BODY MASS INDEX: 42.66 KG/M2 | SYSTOLIC BLOOD PRESSURE: 138 MMHG

## 2018-02-11 ASSESSMENT — PATIENT HEALTH QUESTIONNAIRE - PHQ9: SUM OF ALL RESPONSES TO PHQ QUESTIONS 1-9: 0

## 2018-02-12 NOTE — TELEPHONE ENCOUNTER
Patient Information     Patient Name MRN Sex Shelton Benson [id number] Male 1970      Telephone Encounter by Sasha aDvis at 2017 10:10 AM     Author:  Sasha Davis Service:  (none) Author Type:  (none)     Filed:  2017 10:11 AM Encounter Date:  2017 Status:  Signed     :  Sasha Davis            After talking with Reanna, patient was scheduled for a pre-op on 2017 @ 3:20pm.  This worked for the patient.  Sasha Davis ....................  2017   10:11 AM

## 2018-02-12 NOTE — PROGRESS NOTES
Patient Information     Patient Name MRN Sex Shelton Benson [id number] Male 1970      Progress Notes by Scott Johnston MD at 2017  3:20 PM     Author:  Scott Johnston MD Service:  (none) Author Type:  Physician     Filed:  2017  9:33 PM Encounter Date:  2017 Status:  Signed     :  Scott Johnston MD (Physician)            Nursing Notes:   Reanna Cedeno  2017  3:54 PM  Signed  This patient presents today for a Preoperative exam for this procedure: Right Hand Surgery   Date of Surgery: 17   Surgeon:  Dr. Garcia  Facility:  [facility]  Fax:  N/a  PH: [phone] Sherrill BARRERA-Assistant      Reanna Cedeno LPN        2017 3:16 PM        Shelton Puentes presents to clinic today for:   Chief Complaint    Patient presents with      Preoperative Exam     Referring Provider: Dr. Garcia   HPI: Mr. Puentes is a 47 y.o. male who presents today for Consultative evaluation requested by Dr. Garcia for preoperative cardiopulmonary clearance.     (Z01.818) Preoperative examination - 2017 - right hand surgery - Dr. Garcia - [facility]  (primary encounter diagnosis)  (M18.0) Arthritis of carpometacarpal (CMC) joints of both thumbs  (G47.33,  Z99.89) GWEN on CPAP  (E66.01,  Z68.42) Morbid obesity with BMI of 45.0-49.9, adult (HC)  (E55.9) Vitamin D deficiency  (E78.2) Mixed hyperlipidemia  (F41.8) Anxiety and depression  (R73.9,  T38.0X5A) Steroid-induced hyperglycemia  (E11.9) Controlled type 2 diabetes mellitus without complication, without long-term current use of insulin (HC)     Patient presents for preoperative evaluation prior to hand surgery.    Sleep apnea, uses CPAP nightly, finds helpful.  Advised to bring his CPAP machine to the surgery.    Obesity, discussed need for reduced oral caloric intake.  Regular exercise.  Reduce carbohydrate intake.  Information printed and reviewed.  -- He has been working on  trying to cut out carbohydrates but continues to have a problem with his weight.    Vitamin D labs checked today, vitamin D level is still very low.  Start oral vitamin D replacement.  5000 IU daily.  Prescription sent to pharmacy.    Hyperlipidemia, declined statin therapy at this time. (put in allergy list for now - we will address this again at his next appointment).   Last Lipids:  Chol: 2017 229   239  HDL: 2017 43   LDL: 2017 138    Previous diabetes level was uncontrolled, labs today show diabetes is now controlled.  Doing well with glipizide.  HEMOGLOBIN A1C MONITORING (POCT) (%)    Date Value   2017 7.6 (H)     anxiety, chronic.  Rather severe at times.  He had some issues with anger and no mostly depression-type syndrome as well.  We tried Wellbutrin, did not go very well for him.  He has been having a lot of mental fog with Effexor, while off Effexor his memory was much better.  He also has been having erectile dysfunction issues with Effexor.  His wife and like him to get off of this, but he needs something else to take for his mental health.  We discussed consideration of Lexapro, he still has a large amount of Effexor at home, advised that when he starts getting low on Effexor we would plan to change him over to Lexapro.    Previously uncontrolled type 2 diabetes, currently controlled.  See above.  -- Check EKG    Mr. Reynolds Body mass index is 50.64 kg/(m^2). This is out of the normal range for a 47 y.o. Normal range for ages 18+ is between 18.5 and 24.9. To lose weight we reviewed risks and benefits of appropriate options such as diet, exercise, and medications. Patient's strategy will be  self-directed nutrition plan and self-directed exercise program   BP Readings from Last 1 Encounters:17 : 148/88  Mr. Reynolds blood pressure is out of the normal range for adults. Per JNC-8 guidelines normal adult blood pressure is < 120/80, pre-hypertensive is  between 120/80 and 139/89, and hypertension is 140/90 or greater. Risks of hypertension were discussed. Patient's strategy will be weight loss, increased activity and reduced salt intake    Functional Capacity: > 4 METS.   Reports that he can climb a flight of stairs without any chest pain/heaviness or shortness of breath.   Patient reports no current symptoms of fevers, chills, nausea/vomiting.   No cough. No shortness of breath.   No change in bowel/bladder habits. No melena, hematochezia. No Hematuria.   No rashes. No palpitations.  No orthopnea/paroxysmal nocturnal dyspnea   No vision or hearing issues.   No significant mood issues -- chronic anxiety / depression   -- getting a lot of mental fog with effexor, ED -- discussed consideration of changing to other medication. ? Lexapro.   No bruising.     I have personally reviewed the past medical history, past surgical history, medications, allergies, family and social history as listed below, on 12/12/2017.    Patient Active Problem List       Diagnosis  Date Noted     Arthritis of carpometacarpal (CMC) joints of both thumbs  08/17/2017     Controlled type 2 diabetes mellitus without complication, without long-term current use of insulin (HC)  07/27/2017     Lumbar spinal stenosis  05/01/2017     Steroid-induced hyperglycemia  11/01/2016     Microcytic red blood cells  11/01/2016     Anxiety and depression  11/01/2016     GWEN on CPAP  11/01/2016     Mixed hyperlipidemia  11/01/2016     Morbid obesity with BMI of 45.0-49.9, adult (HC)  11/01/2016     Chronic pain of both knees  11/01/2016     Chronic midline low back pain with left-sided sciatica  11/01/2016     Cyst of left kidney  11/01/2016     Achilles tendonitis, bilateral  07/22/2016     Health care maintenance  07/01/2016     Colonoscopy: Age 50  ITALO/PSA: Given family history of early prostate disease, would check periodically to monitor for changes; last in 08/2015, recheck in 1-3 years  AAA screen: Not  indicated  Immunizations: UTD on other vaccines.   Lipids/Annual Exam: Done 07/2016, repeat as needed for cholesterol management  Hepatitis C screen: not indicated                Vitamin D deficiency  09/23/2015     Family hx of prostate cancer  08/17/2015     Osteoarthritis of multiple joints  03/05/2015     Past Medical History:     Diagnosis  Date     Achilles tendonitis, bilateral 7/22/2016     Chronic back pain      Depression with anxiety      Obstructive sleep apnea     using nasal pillows; sleeping 8 hours      Osteoarthritis of multiple joints     has had injections       Past Surgical History:      Procedure  Laterality Date     TONSILLECTOMY  as a child     Current Outpatient Prescriptions       Medication  Sig Dispense Refill     blood sugar diagnostic (ACCU-CHEK SMARTVIEW TEST STRIP) strip Dispense item covered by pt ins. E11.9 NIDDM type II - Test 3 times/day, Reason: High A1C 300 Strip 3     blood-glucose meter Dispense item covered by pt ins. E11.65 NIDDM type II, uncontrolled - Test 3 time/day 1 Device 0     celecoxib (CELEBREX) 200 mg capsule Take 1 capsule by mouth 2 times daily with meals. 180 capsule 3     cholecalciferol (VITAMIN D3) 5,000 unit capsule Take 1 capsule by mouth once daily. For Vitamin D Deficiency 100 capsule 3     gabapentin (NEURONTIN) 100 mg capsule Take 1-3 capsules by mouth 4 times daily if needed (- for burning/shooting nerve pain). 1080 capsule 1     glipiZIDE (GLUCOTROL) 5 mg tablet Take 1-2 tablets by mouth 3 times daily before meals - Dose increase 9/8/2017 - Stop Tanzeum at this time 180 tablet 3     HAND SUPPORT misc As directed. Arthritis glove, bilateral. Wear on both hands as needed for pain. Dx: [facility] arthritis, 716.94 (M19.90). 2 Each 0     lancets Dispense item covered by pt ins. E11.65 NIDDM type II, uncontrolled - Test 3 times/day, Reason: High A1C, new start Glipizide 100 Each 11     methocarbamol (ROBAXIN) 750 mg tablet Take 1 tablet by mouth 3 times daily. As  needed for pain 90 tablet 11     venlafaxine (EFFEXOR XR) 75 mg cp24 Extended-Release capsule Take 2 capsules by mouth once daily with a meal. 150 mg po daily, NAME BRAND ONLY 60 capsule 11     Recent use of: NSAIDS     Fever/Chills or other infectious symptoms in past month: no  >10lb weight loss in past two months: no    1. Clinic Code Status:Full Code  2. Date Discussed: 12/12/2017   3. Documentation:Discussed with patient who is competent to make decision    Hx of blood transfusions:   (NO)   Tetanus status:    Immunization History     Administered  Date(s) Administered     Influenza, IIV4 10/04/2017     Tdap 10/02/2014      History of VRE/MRSA:  (NO) Date: n/a  Latex allergy  no     Allergies      Allergen   Reactions     Metformin  Other - Describe In Comment Field     Felt very foggy / groggy after taking, when in combination with Robaxin.       Statins-Hmg-Coa Reductase Inhibitors  Other - Describe In Comment Field     -- declines at this time, re-address at next clinic appointment --       Family History      Problem  Relation Age of Onset     Psychiatric illness Father      Psychiatric illness Paternal Grandfather      Psychiatric illness Paternal Uncle      Family Status     Relation  Status     Father Alive    prostate cancer 60's      Mother Alive     Brother Alive    brain tumor age 15; recurrent issues with this      Paternal Grandfather      Paternal Uncle      Denies family hx of bleeding tendencies, anesthesia complications, or other problems with surgery.     Social History     Social History        Marital status:       Spouse name: N/A     Number of children:  N/A     Years of education:  N/A     Social History Main Topics          Smoking status:   Never Smoker      Smokeless tobacco:   Never Used      Alcohol use   0.0 oz/week     0 Standard drinks or equivalent per week        Comment: rarely       Drug use:   No      Sexual activity:   Yes      Partners:  Female      Other Topics   Concern     Not on file      Social History Narrative     Originally from [place]; moved back to [facility] in 2012 after living there for another 3-4 years with wife; , no children.  Laid off in winter 2015 from [employer].  Going to school in [place] for [employer] certification.           Complete ROS was performed and was negative unless otherwise noted in HPI above.    Surgical:  Patient denies previous complications from prior surgeries including but not limited to prolonged bleeding, anesthesia complications, dysrhythmias, surgical wound infections, or prolonged hospital stay.     EXAM:   Vitals:     12/12/17 1526   BP: 138/88   Cuff Site: Right Arm   Position: Sitting   Cuff Size: Adult Regular   Pulse: 76   Weight: (!) 169.4 kg (373 lb 6 oz)   Height: 1.829 m (6')     BP Readings from Last 3 Encounters:    12/12/17 138/88   10/10/17 146/82   10/04/17 148/86     Wt Readings from Last 3 Encounters:    12/12/17 (!) 169.4 kg (373 lb 6 oz)   10/10/17 (!) 166.2 kg (366 lb 6 oz)   10/04/17 (!) 165.2 kg (364 lb 2 oz)     Estimated body mass index is 50.64 kg/(m^2) as calculated from the following:    Height as of this encounter: 1.829 m (6').    Weight as of this encounter: 169.4 kg (373 lb 6 oz).     Mallampati Airway Classification:  Class 3: Soft and hard palate and base of the uvula are visible  EXAM:  Constitutional: well groomed / good hygiene, casual dress  ENT: Normocephalic, Atraumatic, Thyroid without nodules or tenderness   Nose/Mouth: Oral pharynx without erythema or exudates, Nose is patent bilaterally, no rhinorrhea and Dental hygeine adequate   Eyes:  Extraocular muscles intact, Sclera non-icteric, Conjunctiva without erythema  Lymphatic Exam: Non-palpable nodes in neck, clavicular regions  Pulmonary: Lungs are clear to auscultation bilaterally, without wheezes or crackles  Cardiovascular Exam: regular rate and rhythm, trace pedal edema present, no murmur, click, rub or gallop  appreciated  Gastrointestinal Exam: Obese  Integument: No abnormal rashes, sores, or ulcerations noted  Neurologic Exam: CN 3-12 grossly intact   Musculoskeletal Exam: wearing bilateral hand braces. Moves upper and lower extremities symmetrically, No focal weakness  Gait and station appear grossly normal  Psychiatric Exam: Awake and Alert, Affect and mood appropriate  Speech is fluent, Thought process is normal    INVESTIGATIONS;  CXR:  not indicated     EK2017 -Personally ordered/reviewed  EKG interpretation  Normal EKG with rate of 65. No previous for comparison.   Scott Johnston MD     LABS:   Results for orders placed or performed in visit on 17      CBC W PLT NO DIFF      Result  Value Ref Range    WHITE BLOOD COUNT         5.9 4.5 - 11.0 thou/cu mm    RED BLOOD COUNT           6.35 (H) 4.30 - 5.90 mil/cu mm    HEMOGLOBIN                17.0 13.5 - 17.5 g/dL    HEMATOCRIT                50.1 37.0 - 53.0 %    MCV                       79 (L) 80 - 100 fL    MCH                       26.8 26.0 - 34.0 pg    MCHC                      33.9 32.0 - 36.0 g/dL    RDW                       13.9 11.5 - 15.5 %    PLATELET COUNT            162 140 - 440 thou/cu mm    MPV                       10.8 6.5 - 11.0 fL   COMP METABOLIC PANEL      Result  Value Ref Range    SODIUM 136 133 - 143 mmol/L    POTASSIUM 3.9 3.5 - 5.1 mmol/L    CHLORIDE 104 98 - 107 mmol/L    CO2,TOTAL 22 21 - 31 mmol/L    ANION GAP 10 5 - 18                    GLUCOSE 107 (H) 70 - 105 mg/dL    CALCIUM 9.1 8.6 - 10.3 mg/dL    BUN 14 7 - 25 mg/dL    CREATININE 0.75 0.70 - 1.30 mg/dL    BUN/CREAT RATIO           19                    GFR if African American >60 >60 ml/min/1.73m2    GFR if not African American >60 >60 ml/min/1.73m2    ALBUMIN 4.0 3.5 - 5.7 g/dL    PROTEIN,TOTAL 7.2 6.4 - 8.9 g/dL    GLOBULIN                  3.2 2.0 - 3.7 g/dL    A/G RATIO 1.3 1.0 - 2.0                    BILIRUBIN,TOTAL 1.0 0.3 - 1.0 mg/dL    ALK PHOSPHATASE  36 34 - 104 IU/L    ALT (SGPT) 28 7 - 52 IU/L    AST (SGOT) 26 13 - 39 IU/L   HEMOGLOBIN A1C MONITORING (POCT)      Result  Value Ref Range    HEMOGLOBIN A1C MONITORING (POCT) 7.6 (H) 4.0 - 6.2 %    ESTIMATED AVERAGE GLUCOSE  171 mg/dL   LIPID PANEL      Result  Value Ref Range    CHOLESTEROL,TOTAL 229 (H) <200 mg/dL    TRIGLYCERIDES 239 (H) <150 mg/dL    HDL CHOLESTEROL 43 23 - 92 mg/dL    NON-HDL CHOLESTEROL 186 (H) <145 mg/dl    CHOL/HDL RATIO            5.33 (H) <4.50                    LDL CHOLESTEROL 138 (H) <100 mg/dL    PROVIDER ORDERED STATUS RANDOM    VITAMIN D 25 (DEFICIENCY)      Result  Value Ref Range    VITAMIN D TOTAL AFL 20.3   ng/mL      8/23/2017 - Exam: XR HAND 3 VIEWS RIGHT     History: Arthritis of carpometacarpal (CMC) joints of both thumbs     Technique: 3 views.     Findings: No acute fracture or dislocation is seen. There is no focal bone lesion.     There is mild degenerative change in several interphalangeal joints with moderate degenerative change in the first carpal metacarpal joint.       Electronically Signed By: Cassie Meléndez M.D. on 8/23/2017 2:14 PM    ASSESSMENT AND PLAN:    1.  Preoperative history and physical   Shelton was seen today for preoperative exam.    Diagnoses and all orders for this visit:    Preoperative examination - 12/20/2017 - right hand surgery - Dr. Garcia - [facility]  -     AK ELECTROCARDIOGRAM TRACING    Arthritis of carpometacarpal (CMC) joints of both thumbs    GWEN on CPAP    Morbid obesity with BMI of 45.0-49.9, adult (HC)    Vitamin D deficiency  -     VITAMIN D 25 (DEFICIENCY); Future  -     VITAMIN D 25 (DEFICIENCY)  -     cholecalciferol (VITAMIN D3) 5,000 unit capsule; Take 1 capsule by mouth once daily. For Vitamin D Deficiency    Mixed hyperlipidemia  -     LIPID PANEL    Anxiety and depression    Steroid-induced hyperglycemia  -     CBC W PLT NO DIFF  -     COMP METABOLIC PANEL  -     HEMOGLOBIN A1C MONITORING  (POCT)    Controlled type 2 diabetes mellitus without complication, without long-term current use of insulin (HC)  -     EKG 12 LEAD UNIT PERFORMED (PERFORMED TODAY)        Revised Cardiac Risk Index   1. History of ischemic heart disease   2. History of Systolic congestive heart failure (EF less than or equal to 40%)    3. History of cerebrovascular disease (stroke or transient ischemic attack)   4. History of diabetes requiring preoperative insulin use   5. Chronic kidney disease (creatinine > 2 mg/dL)   6. Undergoing suprainguinal vascular, intraperitoneal, or intrathoracic surgery     Risk for cardiac death, nonfatal myocardial infarction, and nonfatal cardiac arrest:   0 predictors = 0.4%   1 predictor = 0.9%  2 predictors = 6.6%  ?3 predictors = >11%       Preoperative asseessment (as of 12/12/2017)      INTERMEDIATE RISK - risk factors including: DM2, Obesity, GWEN on CPAP.      Recommendations as follow:    - Proceed with surgery: As Scheduled.     For above listed surgery and anesthesia:     - Patient is moderate  risk for perioperative complications and IS medically optimized at this time.    ASA Classification:  Class 2 - MILD SYSTEMIC DISEASE, NO ACUTE PROBLEMS, NO FUNCTIONAL LIMITATIONS.    Other: Proceed without further diagnostic evaluation.    Preoperative Evaluation: Obstructive Sleep Apnea screening (STOP_BANG)    -- Diagnosed with Sleep Apnea - YES    - Bring your machine / CPAP with to your surgery      reviewed diet, exercise and weight control, recommended sodium restriction, cardiovascular risk and specific lipid/LDL goals reviewed, specific diabetic recommendations low cholesterol diet, weight control and daily exercise discussed, home glucose monitoring emphasized, foot care discussed and Podiatry visits discussed, annual eye examinations at Ophthalmology discussed, glycohemoglobin and other lab monitoring discussed and long term diabetic complications discussed, use of aspirin to prevent MI  and TIA's discussed    The 10-year ASCVD risk score (Auroramagalie COLLADO Jr, et al., 2013) is: 8%    Values used to calculate the score:      Age: 47 years      Sex: Male      Is Non- : No      Diabetic: Yes      Tobacco smoker: No      Systolic Blood Pressure: 138 mmHg      Is BP treated: No      HDL Cholesterol: 43 mg/dL      Total Cholesterol: 229 mg/dL    Shelton is also recommended to eat a heart-healthy diet, do regular aerobic exercises, maintain a desirable body weight, and avoid tobacco products. These recommendations are from the American Heart Association (AHA) which stresses the importance of lifestyle changes to lower cardiovascular disease risk.       Thank you for allowing me to participate in the care of your patient.   A copy of this evaluation report is provided to referring provider.     Return in about 3 months (around 3/12/2018) for -- labs in 91+ days with Diabetes clinic appointment with Dr. Johnston 1-2 days later..  Patient Instructions     Plan for surgery as scheduled with Dr. Garcia.     Labs today.     -- Try to avoid Carbohydrates as much as possible -- breads, pasta, baked goods, cakes, oatmeal, cold cereal, potatoes.   These are turned to sugar in one metabolic conversion, cause insulin secretion and increased fat deposition / weight gain.      -- Eat more lean meats, proteins, eggs, nuts.      EKG today.     Medications refilled.       Could discuss with [facility] pharmacy about other medication options for Effexor --- if needed.      --> consider changing to Lexapro.     Wean off Gabapentin / Neurontin as able -- this can cause some fogginess.       Patient was reminded of being NPO (without food) after midnight the night before surgery.   -- Hold aspirin, Advil/ibuprofen, Aleve/naproxen, Vitamin E for 7 days before surgery   -- Hold vitamins and herbal remedies for 7 days before surgery     -- Okay to use Tylenol (Acetaminophen)  Okay to take  other usual medications with a sip of water on the day of the procedure and resume their medications after the procedure.   Contact the surgery Center for specific instructions prior to procedure.    PRE OP RECOMMENDATIONS:  Patient is on chronic pain medications (NO) and plan is Acute pain management per surgeon.  Patient is on anti-platelet/anticoagulation (NO) and plan is n/a  Other medications that need adjustment perioperatively (YES) -- hold Celebrex if needed by surgeon -- for 5 days before surgery.      If you have Diabetes or Prediabetes:  -- Take only half of your usual long-acting insulin on the day before surgery (Lantus, Basaglar, Levemir, Toujeo Insulin (concentrated Lantus insulin) or similar.     -- No Glipizide on morning of surgery.     Other:    Patient was advised to call our office and the surgical services with any change in condition or new symptoms if they were to develop between today and their surgical date.  Especially any cardiopulmonary symptoms or symptoms concerning for an infection.      -- Diagnosed with Sleep Apnea - YES    - Bring your machine / CPAP with to your surgery        Return for Diabetes labs and clinic follow-up appointment every 3 to 4 months.  --- (Go for about 91 to 100 days)    Schedule lab only appointment --- A few days AFTER: 03/12/18     Schedule clinic appointment with Dr. Johnston -- Same day as labs, or 1-2 days later.     Insurance companies are now grading you and I on your blood sugar control -- Goal is to get your A1c down to 7.9% or lower and NO Smoking!    -- Medicare and most insurance companies, will only cover Hemoglobin A1c labs to be rechecked every 91+ days.      HEMOGLOBIN A1C MONITORING (POCT) (%)    Date Value   07/27/2017 8.8 (H)        Next follow-up appointment with Dr. Johnston should be scheduled:  -- Approximately a few days AFTER: 03/12/18      Scott Johnston MD

## 2018-02-12 NOTE — PATIENT INSTRUCTIONS
Patient Information     Patient Name MRN Shelton Lopez [id number] Male 1970      Patient Instructions by Scott Johnston MD at 2017  3:20 PM     Author:  Scott Johnston MD  Service:  (none) Author Type:  Physician     Filed:  2017  4:18 PM  Encounter Date:  2017 Status:  Addendum     :  Scott Johnston MD (Physician)        Related Notes: Original Note by Scott Johnston MD (Physician) filed at 2017  4:15 PM            Plan for surgery as scheduled with Dr. Garcia.     Labs today.     -- Try to avoid Carbohydrates as much as possible -- breads, pasta, baked goods, cakes, oatmeal, cold cereal, potatoes.   These are turned to sugar in one metabolic conversion, cause insulin secretion and increased fat deposition / weight gain.      -- Eat more lean meats, proteins, eggs, nuts.      EKG today.     Medications refilled.       Could discuss with [facility] pharmacy about other medication options for Effexor --- if needed.      --> consider changing to Lexapro.     Wean off Gabapentin / Neurontin as able -- this can cause some fogginess.       Patient was reminded of being NPO (without food) after midnight the night before surgery.   -- Hold aspirin, Advil/ibuprofen, Aleve/naproxen, Vitamin E for 7 days before surgery   -- Hold vitamins and herbal remedies for 7 days before surgery     -- Okay to use Tylenol (Acetaminophen)  Okay to take other usual medications with a sip of water on the day of the procedure and resume their medications after the procedure.   Contact the surgery Center for specific instructions prior to procedure.    PRE OP RECOMMENDATIONS:  Patient is on chronic pain medications (NO) and plan is Acute pain management per surgeon.  Patient is on anti-platelet/anticoagulation (NO) and plan is n/a  Other medications that need adjustment perioperatively (YES) -- hold Celebrex if needed by surgeon -- for 5 days before  surgery.      If you have Diabetes or Prediabetes:  -- Take only half of your usual long-acting insulin on the day before surgery (Lantus, Basaglar, Levemir, Toujeo Insulin (concentrated Lantus insulin) or similar.     -- No Glipizide on morning of surgery.     Other:    Patient was advised to call our office and the surgical services with any change in condition or new symptoms if they were to develop between today and their surgical date.  Especially any cardiopulmonary symptoms or symptoms concerning for an infection.      -- Diagnosed with Sleep Apnea - YES    - Bring your machine / CPAP with to your surgery        Return for Diabetes labs and clinic follow-up appointment every 3 to 4 months.  --- (Go for about 91 to 100 days)    Schedule lab only appointment --- A few days AFTER: 03/12/18     Schedule clinic appointment with Dr. Johnston -- Same day as labs, or 1-2 days later.     Insurance companies are now grading you and I on your blood sugar control -- Goal is to get your A1c down to 7.9% or lower and NO Smoking!    -- Medicare and most insurance companies, will only cover Hemoglobin A1c labs to be rechecked every 91+ days.      HEMOGLOBIN A1C MONITORING (POCT) (%)    Date Value   07/27/2017 8.8 (H)        Next follow-up appointment with Dr. Johnston should be scheduled:  -- Approximately a few days AFTER: 03/12/18

## 2018-02-12 NOTE — NURSING NOTE
Patient Information     Patient Name MRN Sex Shelton Benson [id number] Male 1970      Nursing Note by Reanna Cedeno at 2017  3:20 PM     Author:  Reanna Cedeno Service:  (none) Author Type:  (none)     Filed:  2017  3:54 PM Encounter Date:  2017 Status:  Signed     :  Reanna Cedeno            This patient presents today for a Preoperative exam for this procedure: Right Hand Surgery   Date of Surgery: 17   Surgeon:  Dr. Garcia  Facility:  [facility]  Fax:  N/a  PH: [phone] Sherrill BARRERA-Assistant      Reanna Cedeno LPN        2017 3:16 PM

## 2018-02-12 NOTE — TELEPHONE ENCOUNTER
Patient Information     Patient Name MRN Sex Shelton Benson [id number] Male 1970      Telephone Encounter by Selene Chavez at 2017  2:26 PM     Author:  Selene Chavez Service:  (none) Author Type:  (none)     Filed:  2017  2:30 PM Encounter Date:  2017 Status:  Signed     :  Selene Chavez            DJS - Pre op exam; DOS:17; RT hand surgery; [facility]; [facility]. Please call.    Selene Chavez ....................  2017   2:30 PM

## 2018-02-12 NOTE — TELEPHONE ENCOUNTER
Patient Information     Patient Name MRN Shelton Lopez [id number] Male 1970      Telephone Encounter by Sasha Davis at 2017  9:08 AM     Author:  Sasha Davis Service:  (none) Author Type:  (none)     Filed:  2017  9:09 AM Encounter Date:  2017 Status:  Signed     :  Sasha Davis            Spoke with patient's wife.  They are already back in town and are available any day and time.  We just need to see what works best for you and we call call and let them know.  Sasha Davis ....................  2017   9:09 AM

## 2018-02-12 NOTE — TELEPHONE ENCOUNTER
Patient Information     Patient Name MRN Shelton Lopez [id number] Male 1970      Telephone Encounter by Scott Johnston MD at 2017  6:13 PM     Author:  Scott Johnston MD Service:  (none) Author Type:  Physician     Filed:  2017  6:14 PM Encounter Date:  2017 Status:  Signed     :  Scott Johnston MD (Physician)            Please find out when patient will be back in town and what days would potentially work best for him, we will see what availability we can find.    Scott Johnston MD

## 2018-02-12 NOTE — H&P
Patient Information     Patient Name MRN Shelton Lopez [id number] Male 1970      H&P by Scott Johnston MD at 2017  3:20 PM     Author:  Scott Johnston MD Service:  (none) Author Type:  Physician     Filed:  2017  9:33 PM Encounter Date:  2017 Status:  Signed     :  Scott Johnston MD (Physician)            Nursing Notes:   Reanna Cedeno  2017  3:54 PM  Signed  This patient presents today for a Preoperative exam for this procedure: Right Hand Surgery   Date of Surgery: 17   Surgeon:  Dr. Garcia  Facility:  [facility]  Fax:  N/a  PH: [phone] Sherrill BARRERA-Assistant      Reanna Cedeno LPN        2017 3:16 PM        Shelton Puentes presents to clinic today for:   Chief Complaint    Patient presents with      Preoperative Exam     Referring Provider: Dr. Garcia   HPI: Mr. Puentes is a 47 y.o. male who presents today for Consultative evaluation requested by Dr. Garcia for preoperative cardiopulmonary clearance.     (Z01.818) Preoperative examination - 2017 - right hand surgery - Dr. Garcia - [facility]  (primary encounter diagnosis)  (M18.0) Arthritis of carpometacarpal (CMC) joints of both thumbs  (G47.33,  Z99.89) GWEN on CPAP  (E66.01,  Z68.42) Morbid obesity with BMI of 45.0-49.9, adult (HC)  (E55.9) Vitamin D deficiency  (E78.2) Mixed hyperlipidemia  (F41.8) Anxiety and depression  (R73.9,  T38.0X5A) Steroid-induced hyperglycemia  (E11.9) Controlled type 2 diabetes mellitus without complication, without long-term current use of insulin (HC)     Patient presents for preoperative evaluation prior to hand surgery.    Sleep apnea, uses CPAP nightly, finds helpful.  Advised to bring his CPAP machine to the surgery.    Obesity, discussed need for reduced oral caloric intake.  Regular exercise.  Reduce carbohydrate intake.  Information printed and reviewed.  -- He has been working on trying to  cut out carbohydrates but continues to have a problem with his weight.    Vitamin D labs checked today, vitamin D level is still very low.  Start oral vitamin D replacement.  5000 IU daily.  Prescription sent to pharmacy.    Hyperlipidemia, declined statin therapy at this time. (put in allergy list for now - we will address this again at his next appointment).   Last Lipids:  Chol: 2017 229   239  HDL: 2017 43   LDL: 2017 138    Previous diabetes level was uncontrolled, labs today show diabetes is now controlled.  Doing well with glipizide.  HEMOGLOBIN A1C MONITORING (POCT) (%)    Date Value   2017 7.6 (H)     anxiety, chronic.  Rather severe at times.  He had some issues with anger and no mostly depression-type syndrome as well.  We tried Wellbutrin, did not go very well for him.  He has been having a lot of mental fog with Effexor, while off Effexor his memory was much better.  He also has been having erectile dysfunction issues with Effexor.  His wife and like him to get off of this, but he needs something else to take for his mental health.  We discussed consideration of Lexapro, he still has a large amount of Effexor at home, advised that when he starts getting low on Effexor we would plan to change him over to Lexapro.    Previously uncontrolled type 2 diabetes, currently controlled.  See above.  -- Check EKG    Mr. Reynolds Body mass index is 50.64 kg/(m^2). This is out of the normal range for a 47 y.o. Normal range for ages 18+ is between 18.5 and 24.9. To lose weight we reviewed risks and benefits of appropriate options such as diet, exercise, and medications. Patient's strategy will be  self-directed nutrition plan and self-directed exercise program   BP Readings from Last 1 Encounters:17 : 148/88  Mr. Reynolds blood pressure is out of the normal range for adults. Per JNC-8 guidelines normal adult blood pressure is < 120/80, pre-hypertensive is between  120/80 and 139/89, and hypertension is 140/90 or greater. Risks of hypertension were discussed. Patient's strategy will be weight loss, increased activity and reduced salt intake    Functional Capacity: > 4 METS.   Reports that he can climb a flight of stairs without any chest pain/heaviness or shortness of breath.   Patient reports no current symptoms of fevers, chills, nausea/vomiting.   No cough. No shortness of breath.   No change in bowel/bladder habits. No melena, hematochezia. No Hematuria.   No rashes. No palpitations.  No orthopnea/paroxysmal nocturnal dyspnea   No vision or hearing issues.   No significant mood issues -- chronic anxiety / depression   -- getting a lot of mental fog with effexor, ED -- discussed consideration of changing to other medication. ? Lexapro.   No bruising.     I have personally reviewed the past medical history, past surgical history, medications, allergies, family and social history as listed below, on 12/12/2017.    Patient Active Problem List       Diagnosis  Date Noted     Arthritis of carpometacarpal (CMC) joints of both thumbs  08/17/2017     Controlled type 2 diabetes mellitus without complication, without long-term current use of insulin (HC)  07/27/2017     Lumbar spinal stenosis  05/01/2017     Steroid-induced hyperglycemia  11/01/2016     Microcytic red blood cells  11/01/2016     Anxiety and depression  11/01/2016     GWEN on CPAP  11/01/2016     Mixed hyperlipidemia  11/01/2016     Morbid obesity with BMI of 45.0-49.9, adult (HC)  11/01/2016     Chronic pain of both knees  11/01/2016     Chronic midline low back pain with left-sided sciatica  11/01/2016     Cyst of left kidney  11/01/2016     Achilles tendonitis, bilateral  07/22/2016     Health care maintenance  07/01/2016     Colonoscopy: Age 50  ITALO/PSA: Given family history of early prostate disease, would check periodically to monitor for changes; last in 08/2015, recheck in 1-3 years  AAA screen: Not  indicated  Immunizations: UTD on other vaccines.   Lipids/Annual Exam: Done 07/2016, repeat as needed for cholesterol management  Hepatitis C screen: not indicated                Vitamin D deficiency  09/23/2015     Family hx of prostate cancer  08/17/2015     Osteoarthritis of multiple joints  03/05/2015     Past Medical History:     Diagnosis  Date     Achilles tendonitis, bilateral 7/22/2016     Chronic back pain      Depression with anxiety      Obstructive sleep apnea     using nasal pillows; sleeping 8 hours      Osteoarthritis of multiple joints     has had injections       Past Surgical History:      Procedure  Laterality Date     TONSILLECTOMY  as a child     Current Outpatient Prescriptions       Medication  Sig Dispense Refill     blood sugar diagnostic (ACCU-CHEK SMARTVIEW TEST STRIP) strip Dispense item covered by pt ins. E11.9 NIDDM type II - Test 3 times/day, Reason: High A1C 300 Strip 3     blood-glucose meter Dispense item covered by pt ins. E11.65 NIDDM type II, uncontrolled - Test 3 time/day 1 Device 0     celecoxib (CELEBREX) 200 mg capsule Take 1 capsule by mouth 2 times daily with meals. 180 capsule 3     cholecalciferol (VITAMIN D3) 5,000 unit capsule Take 1 capsule by mouth once daily. For Vitamin D Deficiency 100 capsule 3     gabapentin (NEURONTIN) 100 mg capsule Take 1-3 capsules by mouth 4 times daily if needed (- for burning/shooting nerve pain). 1080 capsule 1     glipiZIDE (GLUCOTROL) 5 mg tablet Take 1-2 tablets by mouth 3 times daily before meals - Dose increase 9/8/2017 - Stop Tanzeum at this time 180 tablet 3     HAND SUPPORT misc As directed. Arthritis glove, bilateral. Wear on both hands as needed for pain. Dx: [facility] arthritis, 716.94 (M19.90). 2 Each 0     lancets Dispense item covered by pt ins. E11.65 NIDDM type II, uncontrolled - Test 3 times/day, Reason: High A1C, new start Glipizide 100 Each 11     methocarbamol (ROBAXIN) 750 mg tablet Take 1 tablet by mouth 3 times daily. As  needed for pain 90 tablet 11     venlafaxine (EFFEXOR XR) 75 mg cp24 Extended-Release capsule Take 2 capsules by mouth once daily with a meal. 150 mg po daily, NAME BRAND ONLY 60 capsule 11     Recent use of: NSAIDS     Fever/Chills or other infectious symptoms in past month: no  >10lb weight loss in past two months: no    1. Clinic Code Status:Full Code  2. Date Discussed: 12/12/2017   3. Documentation:Discussed with patient who is competent to make decision    Hx of blood transfusions:   (NO)   Tetanus status:    Immunization History     Administered  Date(s) Administered     Influenza, IIV4 10/04/2017     Tdap 10/02/2014      History of VRE/MRSA:  (NO) Date: n/a  Latex allergy  no     Allergies      Allergen   Reactions     Metformin  Other - Describe In Comment Field     Felt very foggy / groggy after taking, when in combination with Robaxin.       Statins-Hmg-Coa Reductase Inhibitors  Other - Describe In Comment Field     -- declines at this time, re-address at next clinic appointment --       Family History      Problem  Relation Age of Onset     Psychiatric illness Father      Psychiatric illness Paternal Grandfather      Psychiatric illness Paternal Uncle      Family Status     Relation  Status     Father Alive    prostate cancer 60's      Mother Alive     Brother Alive    brain tumor age 15; recurrent issues with this      Paternal Grandfather      Paternal Uncle      Denies family hx of bleeding tendencies, anesthesia complications, or other problems with surgery.     Social History     Social History        Marital status:       Spouse name: N/A     Number of children:  N/A     Years of education:  N/A     Social History Main Topics          Smoking status:   Never Smoker      Smokeless tobacco:   Never Used      Alcohol use   0.0 oz/week     0 Standard drinks or equivalent per week        Comment: rarely       Drug use:   No      Sexual activity:   Yes      Partners:  Female      Other Topics   Concern     Not on file      Social History Narrative     Originally from [place]; moved back to [facility] in 2012 after living there for another 3-4 years with wife; , no children.  Laid off in winter 2015 from [employer] business.  Going to school in [place] for [employer] certification.           Complete ROS was performed and was negative unless otherwise noted in HPI above.    Surgical:  Patient denies previous complications from prior surgeries including but not limited to prolonged bleeding, anesthesia complications, dysrhythmias, surgical wound infections, or prolonged hospital stay.     EXAM:   Vitals:     12/12/17 1526   BP: 138/88   Cuff Site: Right Arm   Position: Sitting   Cuff Size: Adult Regular   Pulse: 76   Weight: (!) 169.4 kg (373 lb 6 oz)   Height: 1.829 m (6')     BP Readings from Last 3 Encounters:    12/12/17 138/88   10/10/17 146/82   10/04/17 148/86     Wt Readings from Last 3 Encounters:    12/12/17 (!) 169.4 kg (373 lb 6 oz)   10/10/17 (!) 166.2 kg (366 lb 6 oz)   10/04/17 (!) 165.2 kg (364 lb 2 oz)     Estimated body mass index is 50.64 kg/(m^2) as calculated from the following:    Height as of this encounter: 1.829 m (6').    Weight as of this encounter: 169.4 kg (373 lb 6 oz).     Mallampati Airway Classification:  Class 3: Soft and hard palate and base of the uvula are visible  EXAM:  Constitutional: well groomed / good hygiene, casual dress  ENT: Normocephalic, Atraumatic, Thyroid without nodules or tenderness   Nose/Mouth: Oral pharynx without erythema or exudates, Nose is patent bilaterally, no rhinorrhea and Dental hygeine adequate   Eyes:  Extraocular muscles intact, Sclera non-icteric, Conjunctiva without erythema  Lymphatic Exam: Non-palpable nodes in neck, clavicular regions  Pulmonary: Lungs are clear to auscultation bilaterally, without wheezes or crackles  Cardiovascular Exam: regular rate and rhythm, trace pedal edema present, no murmur, click, rub or gallop  appreciated  Gastrointestinal Exam: Obese  Integument: No abnormal rashes, sores, or ulcerations noted  Neurologic Exam: CN 3-12 grossly intact   Musculoskeletal Exam: wearing bilateral hand braces. Moves upper and lower extremities symmetrically, No focal weakness  Gait and station appear grossly normal  Psychiatric Exam: Awake and Alert, Affect and mood appropriate  Speech is fluent, Thought process is normal    INVESTIGATIONS;  CXR:  not indicated     EK2017 -Personally ordered/reviewed  EKG interpretation  Normal EKG with rate of 65. No previous for comparison.   Scott Johnston MD     LABS:   Results for orders placed or performed in visit on 17      CBC W PLT NO DIFF      Result  Value Ref Range    WHITE BLOOD COUNT         5.9 4.5 - 11.0 thou/cu mm    RED BLOOD COUNT           6.35 (H) 4.30 - 5.90 mil/cu mm    HEMOGLOBIN                17.0 13.5 - 17.5 g/dL    HEMATOCRIT                50.1 37.0 - 53.0 %    MCV                       79 (L) 80 - 100 fL    MCH                       26.8 26.0 - 34.0 pg    MCHC                      33.9 32.0 - 36.0 g/dL    RDW                       13.9 11.5 - 15.5 %    PLATELET COUNT            162 140 - 440 thou/cu mm    MPV                       10.8 6.5 - 11.0 fL   COMP METABOLIC PANEL      Result  Value Ref Range    SODIUM 136 133 - 143 mmol/L    POTASSIUM 3.9 3.5 - 5.1 mmol/L    CHLORIDE 104 98 - 107 mmol/L    CO2,TOTAL 22 21 - 31 mmol/L    ANION GAP 10 5 - 18                    GLUCOSE 107 (H) 70 - 105 mg/dL    CALCIUM 9.1 8.6 - 10.3 mg/dL    BUN 14 7 - 25 mg/dL    CREATININE 0.75 0.70 - 1.30 mg/dL    BUN/CREAT RATIO           19                    GFR if African American >60 >60 ml/min/1.73m2    GFR if not African American >60 >60 ml/min/1.73m2    ALBUMIN 4.0 3.5 - 5.7 g/dL    PROTEIN,TOTAL 7.2 6.4 - 8.9 g/dL    GLOBULIN                  3.2 2.0 - 3.7 g/dL    A/G RATIO 1.3 1.0 - 2.0                    BILIRUBIN,TOTAL 1.0 0.3 - 1.0 mg/dL    ALK PHOSPHATASE  36 34 - 104 IU/L    ALT (SGPT) 28 7 - 52 IU/L    AST (SGOT) 26 13 - 39 IU/L   HEMOGLOBIN A1C MONITORING (POCT)      Result  Value Ref Range    HEMOGLOBIN A1C MONITORING (POCT) 7.6 (H) 4.0 - 6.2 %    ESTIMATED AVERAGE GLUCOSE  171 mg/dL   LIPID PANEL      Result  Value Ref Range    CHOLESTEROL,TOTAL 229 (H) <200 mg/dL    TRIGLYCERIDES 239 (H) <150 mg/dL    HDL CHOLESTEROL 43 23 - 92 mg/dL    NON-HDL CHOLESTEROL 186 (H) <145 mg/dl    CHOL/HDL RATIO            5.33 (H) <4.50                    LDL CHOLESTEROL 138 (H) <100 mg/dL    PROVIDER ORDERED STATUS RANDOM    VITAMIN D 25 (DEFICIENCY)      Result  Value Ref Range    VITAMIN D TOTAL AFL 20.3   ng/mL      8/23/2017 - Exam: XR HAND 3 VIEWS RIGHT     History: Arthritis of carpometacarpal (CMC) joints of both thumbs     Technique: 3 views.     Findings: No acute fracture or dislocation is seen. There is no focal bone lesion.     There is mild degenerative change in several interphalangeal joints with moderate degenerative change in the first carpal metacarpal joint.       Electronically Signed By: Cassie Meléndez M.D. on 8/23/2017 2:14 PM    ASSESSMENT AND PLAN:    1.  Preoperative history and physical   Shelton was seen today for preoperative exam.    Diagnoses and all orders for this visit:    Preoperative examination - 12/20/2017 - right hand surgery - Dr. Garcia - [facility]  -     DC ELECTROCARDIOGRAM TRACING    Arthritis of carpometacarpal (CMC) joints of both thumbs    GWEN on CPAP    Morbid obesity with BMI of 45.0-49.9, adult (HC)    Vitamin D deficiency  -     VITAMIN D 25 (DEFICIENCY); Future  -     VITAMIN D 25 (DEFICIENCY)  -     cholecalciferol (VITAMIN D3) 5,000 unit capsule; Take 1 capsule by mouth once daily. For Vitamin D Deficiency    Mixed hyperlipidemia  -     LIPID PANEL    Anxiety and depression    Steroid-induced hyperglycemia  -     CBC W PLT NO DIFF  -     COMP METABOLIC PANEL  -     HEMOGLOBIN A1C MONITORING  (POCT)    Controlled type 2 diabetes mellitus without complication, without long-term current use of insulin (HC)  -     EKG 12 LEAD UNIT PERFORMED (PERFORMED TODAY)        Revised Cardiac Risk Index   1. History of ischemic heart disease   2. History of Systolic congestive heart failure (EF less than or equal to 40%)    3. History of cerebrovascular disease (stroke or transient ischemic attack)   4. History of diabetes requiring preoperative insulin use   5. Chronic kidney disease (creatinine > 2 mg/dL)   6. Undergoing suprainguinal vascular, intraperitoneal, or intrathoracic surgery     Risk for cardiac death, nonfatal myocardial infarction, and nonfatal cardiac arrest:   0 predictors = 0.4%   1 predictor = 0.9%  2 predictors = 6.6%  ?3 predictors = >11%       Preoperative asseessment (as of 12/12/2017)      INTERMEDIATE RISK - risk factors including: DM2, Obesity, GWEN on CPAP.      Recommendations as follow:    - Proceed with surgery: As Scheduled.     For above listed surgery and anesthesia:     - Patient is moderate  risk for perioperative complications and IS medically optimized at this time.    ASA Classification:  Class 2 - MILD SYSTEMIC DISEASE, NO ACUTE PROBLEMS, NO FUNCTIONAL LIMITATIONS.    Other: Proceed without further diagnostic evaluation.    Preoperative Evaluation: Obstructive Sleep Apnea screening (STOP_BANG)    -- Diagnosed with Sleep Apnea - YES    - Bring your machine / CPAP with to your surgery      reviewed diet, exercise and weight control, recommended sodium restriction, cardiovascular risk and specific lipid/LDL goals reviewed, specific diabetic recommendations low cholesterol diet, weight control and daily exercise discussed, home glucose monitoring emphasized, foot care discussed and Podiatry visits discussed, annual eye examinations at Ophthalmology discussed, glycohemoglobin and other lab monitoring discussed and long term diabetic complications discussed, use of aspirin to prevent MI  and TIA's discussed    The 10-year ASCVD risk score (Brutusmagalie COLLADO Jr, et al., 2013) is: 8%    Values used to calculate the score:      Age: 47 years      Sex: Male      Is Non- : No      Diabetic: Yes      Tobacco smoker: No      Systolic Blood Pressure: 138 mmHg      Is BP treated: No      HDL Cholesterol: 43 mg/dL      Total Cholesterol: 229 mg/dL    Shelton is also recommended to eat a heart-healthy diet, do regular aerobic exercises, maintain a desirable body weight, and avoid tobacco products. These recommendations are from the American Heart Association (AHA) which stresses the importance of lifestyle changes to lower cardiovascular disease risk.       Thank you for allowing me to participate in the care of your patient.   A copy of this evaluation report is provided to referring provider.     Return in about 3 months (around 3/12/2018) for -- labs in 91+ days with Diabetes clinic appointment with Dr. Johnston 1-2 days later..  Patient Instructions     Plan for surgery as scheduled with Dr. Garcia.     Labs today.     -- Try to avoid Carbohydrates as much as possible -- breads, pasta, baked goods, cakes, oatmeal, cold cereal, potatoes.   These are turned to sugar in one metabolic conversion, cause insulin secretion and increased fat deposition / weight gain.      -- Eat more lean meats, proteins, eggs, nuts.      EKG today.     Medications refilled.       Could discuss with [facility] pharmacy about other medication options for Effexor --- if needed.      --> consider changing to Lexapro.     Wean off Gabapentin / Neurontin as able -- this can cause some fogginess.       Patient was reminded of being NPO (without food) after midnight the night before surgery.   -- Hold aspirin, Advil/ibuprofen, Aleve/naproxen, Vitamin E for 7 days before surgery   -- Hold vitamins and herbal remedies for 7 days before surgery     -- Okay to use Tylenol (Acetaminophen)  Okay to take  other usual medications with a sip of water on the day of the procedure and resume their medications after the procedure.   Contact the surgery Center for specific instructions prior to procedure.    PRE OP RECOMMENDATIONS:  Patient is on chronic pain medications (NO) and plan is Acute pain management per surgeon.  Patient is on anti-platelet/anticoagulation (NO) and plan is n/a  Other medications that need adjustment perioperatively (YES) -- hold Celebrex if needed by surgeon -- for 5 days before surgery.      If you have Diabetes or Prediabetes:  -- Take only half of your usual long-acting insulin on the day before surgery (Lantus, Basaglar, Levemir, Toujeo Insulin (concentrated Lantus insulin) or similar.     -- No Glipizide on morning of surgery.     Other:    Patient was advised to call our office and the surgical services with any change in condition or new symptoms if they were to develop between today and their surgical date.  Especially any cardiopulmonary symptoms or symptoms concerning for an infection.      -- Diagnosed with Sleep Apnea - YES    - Bring your machine / CPAP with to your surgery        Return for Diabetes labs and clinic follow-up appointment every 3 to 4 months.  --- (Go for about 91 to 100 days)    Schedule lab only appointment --- A few days AFTER: 03/12/18     Schedule clinic appointment with Dr. Johnston -- Same day as labs, or 1-2 days later.     Insurance companies are now grading you and I on your blood sugar control -- Goal is to get your A1c down to 7.9% or lower and NO Smoking!    -- Medicare and most insurance companies, will only cover Hemoglobin A1c labs to be rechecked every 91+ days.      HEMOGLOBIN A1C MONITORING (POCT) (%)    Date Value   07/27/2017 8.8 (H)        Next follow-up appointment with Dr. Johnston should be scheduled:  -- Approximately a few days AFTER: 03/12/18      Scott Johnston MD

## 2018-02-12 NOTE — TELEPHONE ENCOUNTER
Patient Information     Patient Name MRN Shelton Lopez [id number] Male 1970      Telephone Encounter by Ananya Mckinley RN at 2017 11:12 AM     Author:  Aannya Mckinley RN Service:  (none) Author Type:  NURS- Registered Nurse     Filed:  2017 11:15 AM Encounter Date:  2017 Status:  Signed     :  Ananya Mckinley RN (NURS- Registered Nurse)            celebrex noted as refilled on 10/10/17 #180 x 3 refills and methocarbamol noted as refilled on17 #90 x 11 refills to [facility].  ANANYA MCKINLEY RN ....................  2017   11:13 AM

## 2018-02-13 ENCOUNTER — TRANSFERRED RECORDS (OUTPATIENT)
Dept: HEALTH INFORMATION MANAGEMENT | Facility: OTHER | Age: 48
End: 2018-02-13

## 2018-02-13 NOTE — TELEPHONE ENCOUNTER
Patient Information     Patient Name MRN Sex Shelton Benson [id number] Male 1970      Telephone Encounter by Scott Johnston MD at 2018  2:12 PM     Author:  Scott Johnston MD Service:  (none) Author Type:  Physician     Filed:  2018  2:13 PM Encounter Date:  2018 Status:  Signed     :  Scott Johnston MD (Physician)            Noted. Okay to increase CPAP pressure to 15 cm water.     Get print-out from [employer] in about 2 weeks --- with CPAP report, have [employer] fax it to us.     Scott Johnston MD

## 2018-02-13 NOTE — TELEPHONE ENCOUNTER
Patient Information     Patient Name MRN Sex Shelton Benson [id number] Male 1970      Telephone Encounter by Reanna Cedeno at 2018  2:39 PM     Author:  Reanna Cedeno Service:  (none) Author Type:  (none)     Filed:  2018  2:46 PM Encounter Date:  2018 Status:  Signed     :  Reanna Cedeno            Patient and [facility] notified of providers note.      Reanna Cedeno LPN        2018 2:39 PM

## 2018-02-13 NOTE — TELEPHONE ENCOUNTER
Patient Information     Patient Name MRN Shelton Lopez [id number] Male 1970      Telephone Encounter by Maxien Beltran RN at 2018 11:18 AM     Author:  Maxine Beltran RN Service:  (none) Author Type:  NURS- Registered Nurse     Filed:  2018 11:30 AM Encounter Date:  2018 Status:  Signed     :  Maxine Beltran RN (NURS- Registered Nurse)            Vanessa is stating that insurance plan does not cover Effexor. Pharmacy states they will either need new medication or for PCP to initiate a PA for Effexor. Per last office visit notes on 17 with Scott Johnston MD, patient was going to use up the remaining Effexor he had, and then consider  switching to Lexapro due to some adverse effects he reported. Patient was to call when getting low on Effexor to request new order for Lexapro.     Attempted call to patient to confirm his preference, but unable to reach at this time. Routed to Scott Johnston MD to consider change in medication at this time being it was discussed at last visit.   Maxine Beltran RN ....................  2018   11:23 AM

## 2018-02-13 NOTE — TELEPHONE ENCOUNTER
Patient Information     Patient Name MRN Sex Shelton Benson [id number] Male 1970      Telephone Encounter by Reanna Cedeno at 2018  2:08 PM     Author:  Reanna Cedeno Service:  (none) Author Type:  (none)     Filed:  2018  2:09 PM Encounter Date:  2018 Status:  Signed     :  Reanna Cedeno            Patient wondering if he could get Scott Johnston MD to approve the pressure on his CPAP to increase from 13 to 15.  [facility] will not do this without the MD approval.      Reanna Cedeno LPN        2018 2:09 PM

## 2018-02-19 RX ORDER — METHOCARBAMOL 750 MG/1
750 TABLET, FILM COATED ORAL
COMMUNITY
Start: 2017-07-27 | End: 2018-05-11

## 2018-02-19 RX ORDER — VENLAFAXINE HYDROCHLORIDE 75 MG/1
150 CAPSULE, EXTENDED RELEASE ORAL
COMMUNITY
Start: 2017-12-12 | End: 2018-02-21

## 2018-02-19 RX ORDER — INSULIN PUMP SYRINGE, 3 ML
EACH MISCELLANEOUS
COMMUNITY
Start: 2017-07-27

## 2018-02-19 RX ORDER — GLIPIZIDE 5 MG/1
TABLET ORAL
COMMUNITY
Start: 2017-09-08 | End: 2018-03-20

## 2018-02-19 RX ORDER — ARM BRACE
EACH MISCELLANEOUS
COMMUNITY
Start: 2015-08-21 | End: 2018-05-11

## 2018-02-19 RX ORDER — OXYCODONE AND ACETAMINOPHEN 5; 325 MG/1; MG/1
1-2 TABLET ORAL
COMMUNITY
Start: 2017-12-20 | End: 2018-05-11

## 2018-02-19 RX ORDER — CELECOXIB 200 MG/1
200 CAPSULE ORAL
COMMUNITY
Start: 2017-10-10 | End: 2018-02-26

## 2018-02-20 ENCOUNTER — DOCUMENTATION ONLY (OUTPATIENT)
Dept: FAMILY MEDICINE | Facility: OTHER | Age: 48
End: 2018-02-20

## 2018-02-20 PROBLEM — M18.0 ARTHRITIS OF CARPOMETACARPAL (CMC) JOINTS OF BOTH THUMBS: Status: ACTIVE | Noted: 2017-08-17

## 2018-02-20 PROBLEM — M48.061 LUMBAR SPINAL STENOSIS: Status: ACTIVE | Noted: 2017-05-01

## 2018-02-21 ENCOUNTER — OFFICE VISIT (OUTPATIENT)
Dept: INTERNAL MEDICINE | Facility: OTHER | Age: 48
End: 2018-02-21
Attending: INTERNAL MEDICINE
Payer: COMMERCIAL

## 2018-02-21 VITALS
HEART RATE: 74 BPM | HEIGHT: 60 IN | WEIGHT: 315 LBS | DIASTOLIC BLOOD PRESSURE: 88 MMHG | SYSTOLIC BLOOD PRESSURE: 130 MMHG | OXYGEN SATURATION: 96 % | BODY MASS INDEX: 61.84 KG/M2

## 2018-02-21 DIAGNOSIS — F32.A ANXIETY AND DEPRESSION: ICD-10-CM

## 2018-02-21 DIAGNOSIS — E66.01 MORBID OBESITY WITH BMI OF 45.0-49.9, ADULT (H): ICD-10-CM

## 2018-02-21 DIAGNOSIS — F41.9 ANXIETY AND DEPRESSION: ICD-10-CM

## 2018-02-21 DIAGNOSIS — E78.2 MIXED HYPERLIPIDEMIA: ICD-10-CM

## 2018-02-21 DIAGNOSIS — E11.9 CONTROLLED TYPE 2 DIABETES MELLITUS WITHOUT COMPLICATION, WITHOUT LONG-TERM CURRENT USE OF INSULIN (H): ICD-10-CM

## 2018-02-21 DIAGNOSIS — M15.0 PRIMARY OSTEOARTHRITIS INVOLVING MULTIPLE JOINTS: ICD-10-CM

## 2018-02-21 DIAGNOSIS — Z01.818 PREOP GENERAL PHYSICAL EXAM: Primary | ICD-10-CM

## 2018-02-21 DIAGNOSIS — M18.0 ARTHRITIS OF CARPOMETACARPAL (CMC) JOINTS OF BOTH THUMBS: ICD-10-CM

## 2018-02-21 DIAGNOSIS — G47.33 OSA ON CPAP: ICD-10-CM

## 2018-02-21 PROCEDURE — 99214 OFFICE O/P EST MOD 30 MIN: CPT | Performed by: INTERNAL MEDICINE

## 2018-02-21 PROCEDURE — G0463 HOSPITAL OUTPT CLINIC VISIT: HCPCS

## 2018-02-21 RX ORDER — ESCITALOPRAM OXALATE 10 MG/1
10-20 TABLET ORAL DAILY
Qty: 90 TABLET | Refills: 1 | Status: SHIPPED | OUTPATIENT
Start: 2018-02-21 | End: 2018-03-02

## 2018-02-21 RX ORDER — ESCITALOPRAM OXALATE 10 MG/1
10 TABLET ORAL DAILY
COMMUNITY
Start: 2018-01-18 | End: 2018-02-21

## 2018-02-21 NOTE — MR AVS SNAPSHOT
After Visit Summary   2/21/2018    Shelton Puentes    MRN: [id number]           Patient Information     Date Of Birth          1970        Visit Information        Provider Department      2/21/2018 11:20 AM Scott Johnston MD [facility]        Today's Diagnoses     Preop general physical exam    -  1    Arthritis of carpometacarpal (CMC) joints of both thumbs        Controlled type 2 diabetes mellitus without complication, without long-term current use of insulin (H)        Anxiety and depression        Mixed hyperlipidemia        Morbid obesity with BMI of 45.0-49.9, adult (H)        GWEN on CPAP        Primary osteoarthritis involving multiple joints          Care Instructions      Before Your Surgery      Call your surgeon if there is any change in your health. This includes signs of a cold or flu (such as a sore throat, runny nose, cough, rash or fever).    Do not smoke, drink alcohol or take over the counter medicine (unless your surgeon or primary care doctor tells you to) for the 24 hours before and after surgery.    If you take prescribed drugs: Follow your doctor s orders about which medicines to take and which to stop until after surgery.    Eating and drinking prior to surgery: follow the instructions from your surgeon    Take a shower or bath the night before surgery. Use the soap your surgeon gave you to gently clean your skin. If you do not have soap from your surgeon, use your regular soap. Do not shave or scrub the surgery site.  Wear clean pajamas and have clean sheets on your bed.     Continue current medications.   Hold Celebrex -- for as long as your surgeon wants prior to surgery.   Bring your CPAP with you to surgery.     To help with weight loss and improve blood sugar control....    -- Try to avoid Carbohydrates as much as possible -- breads, pasta, baked goods, cakes, oatmeal, cold cereal, potatoes.   These are turned to sugar in one  "metabolic conversion, cause insulin secretion and increased fat deposition / weight gain.      -- Eat more lean meats, proteins, eggs, nuts.     Return as needed for follow-up with Dr. Johnston.    Clinic : [phone]  Appointment line: [phone]            Follow-ups after your visit        Follow-up notes from your care team     Return if symptoms worsen or fail to improve.      Who to contact     If you have questions or need follow up information about today's clinic visit or your schedule please contact [facility] directly at [phone].  Normal or non-critical lab and imaging results will be communicated to you by My Lu[employer] My Life My Heartbeatshart, letter or phone within 4 business days after the clinic has received the results. If you do not hear from us within 7 days, please contact the clinic through [employer] or phone. If you have a critical or abnormal lab result, we will notify you by phone as soon as possible.  Submit refill requests through [employer] or call your pharmacy and they will forward the refill request to us. Please allow 3 business days for your refill to be completed.          Additional Information About Your Visit        MyChart Information     [employer]ra lets you send messages to your doctor, view your test results, renew your prescriptions, schedule appointments and more. To sign up, go to [email or web address] . Click on \"Log in\" on the left side of the screen, which will take you to the Welcome page. Then click on \"Sign up Now\" on the right side of the page.     You will be asked to enter the access code listed below, as well as some personal information. Please follow the directions to create your username and password.     Your access code is: [email or web address]  Expires: 2018 12:17 PM     Your access code will  in 90 days. If you need help or a new code, please call your [facility] clinic or [phone].        Care EveryWhere ID     This is your Care EveryWhere ID. This " "could be used by other organizations to access your [place] medical records  [id number]        Your Vitals Were     Pulse Height Pulse Oximetry BMI (Body Mass Index)          74 6\" (0.152 m) 96% 7,323.72 kg/m2         Blood Pressure from Last 3 Encounters:   02/21/18 130/88   12/12/17 138/88   10/10/17 146/82    Weight from Last 3 Encounters:   02/21/18 (!) 375 lb (170.1 kg)   12/12/17 (!) 373 lb 6 oz (169.4 kg)   10/10/17 (!) 366 lb 6 oz (166.2 kg)              Today, you had the following     No orders found for display         Today's Medication Changes          These changes are accurate as of 2/21/18 12:17 PM.  If you have any questions, ask your nurse or doctor.               These medicines have changed or have updated prescriptions.        Dose/Directions    escitalopram 10 MG tablet   Commonly known as:  LEXAPRO   This may have changed:    - how much to take  - additional instructions   Used for:  Anxiety and depression   Changed by:  Scott Johnston MD        Dose:  10-20 mg   Take 1-2 tablets (10-20 mg) by mouth daily - start 10 mg daily, can increase after 2 weeks - Wean off Effexor then start Lexapro   Quantity:  90 tablet   Refills:  1         Stop taking these medicines if you haven't already. Please contact your care team if you have questions.     venlafaxine 75 MG 24 hr capsule   Commonly known as:  EFFEXOR-XR   Stopped by:  Scott Johnston MD                Where to get your medicines      These medications were sent to [id number] - [place], [place] - [address]  [address], [facility] [address]     Phone:  [phone]     escitalopram 10 MG tablet                Primary Care Provider Office Phone # Fax #    Scott Johnston [phone] [address] [facility] [address]        Equal Access to Services     MADHU WELCH AH: Hai avendaño Soomaali, waaxda luqadaha, qaybta kaalsonido flores " shawn orellana. So [facility] [phone].    ATENCIÓN: Si narinderla maddy, tiene a rueda disposición servicios gratuitos de asistencia lingüística. Hosea souza [phone].    We comply with applicable federal civil rights laws and [place] laws. We do not discriminate on the basis of race, color, national origin, age, disability, sex, sexual orientation, or gender identity.            Thank you!     Thank you for choosing [facility]  for your care. Our goal is always to provide you with excellent care. Hearing back from our patients is one way we can continue to improve our services. Please take a few minutes to complete the written survey that you may receive in the mail after your visit with us. Thank you!             Your Updated Medication List - Protect others around you: Learn how to safely use, store and throw away your medicines at www.disposemymeds.org.          This list is accurate as of 2/21/18 12:17 PM.  Always use your most recent med list.                   Brand Name Dispense Instructions for use Diagnosis    ACE HAND SUPPORT GLOVE SM/MED Misc      As directed. Arthritis glove, bilateral. Wear on both hands as needed for pain. Dx: [facility] arthritis, 716.94 (M19.90).        blood glucose lancets standard    no brand specified     Dispense item covered by pt ins. E11.65 NIDDM type II, uncontrolled - Test 3 times/day, Reason: High A1C, new start Glipizide        blood glucose monitoring test strip    no brand specified     Dispense item covered by pt ins. E11.9 NIDDM type II - Test 3 times/day, Reason: High A1C        celecoxib 200 MG capsule    celeBREX     Take 200 mg by mouth        D 5000 5000 UNITS Caps   Generic drug:  cholecalciferol      Take 5,000 Units by mouth        escitalopram 10 MG tablet    LEXAPRO    90 tablet    Take 1-2 tablets (10-20 mg) by mouth daily - start 10 mg daily, can increase after 2 weeks - Wean off Effexor then start Lexapro    Anxiety and  depression       FIFTY50 GLUCOSE METER 2.0 W/DEVICE Kit      Dispense item covered by pt ins. E11.65 NIDDM type II, uncontrolled - Test 3 time/day        glipiZIDE 5 MG tablet    GLUCOTROL     Take 1-2 tablets by mouth 3 times daily before meals - Dose increase 9/8/2017 - Stop Tanzeum at this time        methocarbamol 750 MG tablet    ROBAXIN     Take 750 mg by mouth        oxyCODONE-acetaminophen 5-325 MG per tablet    PERCOCET     Take 1-2 tablets by mouth

## 2018-02-21 NOTE — PROGRESS NOTES
[facility]  [address]  [place] [address]  [phone]    PRE-OP EVALUATION:  Today's date: 2018    Shelton Puentes (: 1970) presents for pre-operative evaluation assessment as requested by Dr. Kulkarni.  He requires evaluation and anesthesia risk assessment prior to undergoing surgery/procedure for treatment of l thumb procedure .    Proposed Surgery/ Procedure: left thumb procedure  Date of Surgery/ Procedure: 18  Time of Surgery/ Procedure:// unknown  Hospital/Surgical Facility: [facility]  Fax number for surgical facility: [phone]  Primary Physician: Scott Johnston  Type of Anesthesia Anticipated: Choice    Patient has a Health Care Directive or Living Will:  NO    1. NO - Do you have a history of heart attack, stroke, stent, bypass or surgery on an artery in the head, neck, heart or legs?  2. NO - Do you ever have any pain or discomfort in your chest?  3. NO - Do you have a history of  Heart Failure?  4. NO - Are you troubled by shortness of breath when: walking on the level, up a slight hill or at night?  5. NO - Do you currently have a cold, bronchitis or other respiratory infection?  6. NO - Do you have a cough, shortness of breath or wheezing?  7. NO - Do you sometimes get pains in the calves of your legs when you walk?  8. NO - Do you or anyone in your family have previous history of blood clots?  9. NO - Do you or does anyone in your family have a serious bleeding problem such as prolonged bleeding following surgeries or cuts?  10. NO - Have you ever had problems with anemia or been told to take iron pills?  11. NO - Have you had any abnormal blood loss such as black, tarry or bloody stools, or abnormal vaginal bleeding?  12. NO - Have you ever had a blood transfusion?  13. NO - Have you or any of your relatives ever had problems with anesthesia?  14. YES - DO YOU HAVE SLEEP APNEA, EXCESSIVE SNORING OR DAYTIME DROWSINESS?  -- Uses CPAP nightly. Finds helpful.   15. NO - Do you have any prosthetic heart valves?  16. NO - Do you have prosthetic joints?  17. NO - Is there any chance that you may be pregnant?      HPI:      Preop general physical exam  Arthritis of carpometacarpal (CMC) joints of both thumbs  Controlled type 2 diabetes mellitus without complication, without long-term current use of insulin (H)  Anxiety and depression  Mixed hyperlipidemia  Morbid obesity with BMI of 45.0-49.9, adult (H)  GWEN on CPAP  Primary osteoarthritis involving multiple joints    HPI related to upcoming procedure: Has significant hand arthritis pain.  Underwent right thumb CMC joint surgery and did well with this.  Reports pain is substantially improved.  Still having a lot of pain of his left CMC joint, left hand.  Currently scheduled for surgery.    Diabetes, currently well controlled.  Doing well with current medication regimen.  Taking glipizide.  Advised that he hold these once he is n.p.o. for surgery.  DIABETES - Patient has a longstanding history of DiabetesType Type II . Patient is being treated with oral agents and denies significant side effects. Control has been good. Complicating factors include but are not limited to: obesity.           Anxiety and depression, was on Effexor but had a very foggy mentation.  He would like to try changing to Lexapro.  Prescription updated and new prescription sent to pharmacy.    Hyperlipidemia, doing well with his statin.  Needs refills today.    Obesity, discussed need for reduced oral caloric intake.  Regular exercise.  Reduce carbohydrate intake.  Information printed and reviewed.    Sleep apnea, uses CPAP nightly.  Finds very helpful.  Advised to bring this with him to his surgery.    Multiple joint arthritis, still using Celebrex.  Advised that he may need to hold this prior to surgery.                                                                                                      .    MEDICAL  HISTORY:     Patient Active Problem List    Diagnosis Date Noted     Arthritis of carpometacarpal (CMC) joints of both thumbs 08/17/2017     Priority: Medium     Controlled type 2 diabetes mellitus without complication, without long-term current use of insulin (H) 07/27/2017     Priority: Medium     Lumbar spinal stenosis 05/01/2017     Priority: Medium     Anxiety and depression 11/01/2016     Priority: Medium     Chronic midline low back pain with left-sided sciatica 11/01/2016     Priority: Medium     Chronic pain of both knees 11/01/2016     Priority: Medium     Cyst of left kidney 11/01/2016     Priority: Medium     Microcytic red blood cells 11/01/2016     Priority: Medium     Mixed hyperlipidemia 11/01/2016     Priority: Medium     Morbid obesity with BMI of 45.0-49.9, adult (H) 11/01/2016     Priority: Medium     GWEN on CPAP 11/01/2016     Priority: Medium     Steroid-induced hyperglycemia 11/01/2016     Priority: Medium     Achilles tendonitis, bilateral 07/22/2016     Priority: Medium     Health care maintenance 07/01/2016     Priority: Medium     Overview:   Colonoscopy: Age 50  ITALO/PSA: Given family history of early prostate disease, would check periodically to monitor for changes; last in 08/2015, recheck in 1-3 years  AAA screen: Not indicated  Immunizations: UTD on other vaccines.   Lipids/Annual Exam: Done 07/2016, repeat as needed for cholesterol management  Hepatitis C screen: not indicated       Vitamin D deficiency 09/23/2015     Priority: Medium     Family hx of prostate cancer 08/17/2015     Priority: Medium     Osteoarthritis of multiple joints 03/05/2015     Priority: Medium      Past Medical History:   Diagnosis Date     Achilles tendinitis of left lower extremity     7/22/2016     Dorsalgia     No Comments Provided     Obstructive sleep apnea     using nasal pillows; sleeping 8 hours     Other specified anxiety disorders     No Comments Provided     Polyosteoarthritis     has had injections      Past Surgical History:   Procedure Laterality Date     TONSILLECTOMY      as a child     Current Outpatient Prescriptions   Medication Sig Dispense Refill     escitalopram (LEXAPRO) 10 MG tablet Take 1-2 tablets (10-20 mg) by mouth daily - start 10 mg daily, can increase after 2 weeks - Wean off Effexor then start Lexapro 90 tablet 1     [DISCONTINUED] escitalopram (LEXAPRO) 10 MG tablet Take 10 mg by mouth daily       blood glucose monitoring (NO BRAND SPECIFIED) test strip Dispense item covered by pt ins. E11.9 NIDDM type II - Test 3 times/day, Reason: High A1C       Blood Glucose Monitoring Suppl (FIFTY50 GLUCOSE METER 2.0) W/DEVICE KIT Dispense item covered by pt ins. E11.65 NIDDM type II, uncontrolled - Test 3 time/day       celecoxib (CELEBREX) 200 MG capsule Take 200 mg by mouth       cholecalciferol (D 5000) 5000 UNITS CAPS Take 5,000 Units by mouth       glipiZIDE (GLUCOTROL) 5 MG tablet Take 1-2 tablets by mouth 3 times daily before meals - Dose increase 9/8/2017 - Stop Tanzeum at this time       Elastic Bandages & Supports (ACE HAND SUPPORT GLOVE SM/MED) MISC As directed. Arthritis glove, bilateral. Wear on both hands as needed for pain. Dx: [facility] arthritis, 716.94 (M19.90).       blood glucose (NO BRAND SPECIFIED) lancets standard Dispense item covered by pt ins. E11.65 NIDDM type II, uncontrolled - Test 3 times/day, Reason: High A1C, new start Glipizide       methocarbamol (ROBAXIN) 750 MG tablet Take 750 mg by mouth       oxyCODONE-acetaminophen (PERCOCET) 5-325 MG per tablet Take 1-2 tablets by mouth       OTC products: NSAIDS    Allergies   Allergen Reactions     Hmg-Coa-R Inhibitors Other (See Comments)     -- declines at this time, re-address at next clinic appointment --     Metformin Other (See Comments)     Felt very foggy / groggy after taking, when in combination with Robaxin.       Latex Allergy: NO    Social History   Substance Use Topics     Smoking status: Never Smoker     Smokeless  "tobacco: Never Used     Alcohol use 0.0 oz/week      Comment: Alcoholic Drinks/day: rarely     History   Drug Use No     Comment: Drug use: No       REVIEW OF SYSTEMS:   Constitutional, neuro, ENT, endocrine, pulmonary, cardiac, gastrointestinal, genitourinary, musculoskeletal, integument and psychiatric systems are negative, except as otherwise noted.    EXAM:   /88 (BP Location: Right arm, Patient Position: Sitting, Cuff Size: Adult Large)  Pulse 74  Ht (!) 6\" (0.152 m)  Wt (!) 375 lb (170.1 kg)  SpO2 96%  BMI 7,323.72 kg/m2    GENERAL APPEARANCE: healthy, alert and no distress     EYES: EOMI,  PERRL     HENT: ear canals and TM's normal and nose and mouth without ulcers or lesions     NECK: no adenopathy, no asymmetry, masses, or scars and thyroid normal to palpation     RESP: lungs clear to auscultation - no rales, rhonchi or wheezes     CV: regular rates and rhythm, normal S1 S2, no S3 or S4 and no murmur, click or rub     ABDOMEN:  soft, nontender, no HSM or masses and bowel sounds normal + obesity.      MS: extremities normal- no gross deformities noted, no evidence of inflammation in joints in all extremities. - pain with use of left thumb.      SKIN: no suspicious lesions or rashes     NEURO: Normal strength and tone, sensory exam grossly normal, mentation intact and speech normal     PSYCH: mentation appears normal. and affect normal/bright     LYMPHATICS: No cervical adenopathy    DIAGNOSTICS:   EKG: Not indicated due to non-vascular surgery and low risk of event (age <65 and without cardiac risk factors)    Recent Labs   Lab Test  12/12/17   1716  12/12/17   1646  07/27/17   1249  07/27/17   1221   HGB   --   17.0   --   16.8   PLT   --   162   --   152   NA  136   --   139   --    POTASSIUM  3.9   --   4.3   --    CR  0.75   --   0.86   --         IMPRESSION:   Reason for surgery/procedure: Left hand pain.  Diagnosis/reason for consult: Preoperative cardiovascular/diabetes risk " reduction    The proposed surgical procedure is considered INTERMEDIATE risk.    REVISED CARDIAC RISK INDEX  The patient has the following serious cardiovascular risks for perioperative complications such as (MI, PE, VFib and 3  AV Block):  No serious cardiac risks  INTERPRETATION: 0 risks: Class I (very low risk - 0.4% complication rate)    The patient has the following additional risks for perioperative complications:  No identified additional risks      ICD-10-CM    1. Preop general physical exam Z01.818    2. Arthritis of carpometacarpal (CMC) joints of both thumbs M18.0    3. Controlled type 2 diabetes mellitus without complication, without long-term current use of insulin (H) E11.9    4. Anxiety and depression F41.8 escitalopram (LEXAPRO) 10 MG tablet   5. Mixed hyperlipidemia E78.2    6. Morbid obesity with BMI of 45.0-49.9, adult (H) E66.01     Z68.42    7. GWEN on CPAP G47.33     Z99.89    8. Primary osteoarthritis involving multiple joints M15.0        RECOMMENDATIONS:       Obstructive Sleep Apnea (or suspected sleep apnea)  Patient is to bring their home CPAP with them on the day of surgery      --Patient is to take all scheduled medications on the day of surgery EXCEPT for modifications listed below.    Diabetes Medication Use  -----Hold usual oral and non-insulin diabetic meds (e.g. Metformin, Actos, Glipizide) while NPO.       APPROVAL GIVEN to proceed with proposed procedure, without further diagnostic evaluation       Signed Electronically by: Scott Johnston MD    Copy of this evaluation report is provided to requesting physician.    [facility] Preop Guidelines

## 2018-02-21 NOTE — PATIENT INSTRUCTIONS
Before Your Surgery      Call your surgeon if there is any change in your health. This includes signs of a cold or flu (such as a sore throat, runny nose, cough, rash or fever).    Do not smoke, drink alcohol or take over the counter medicine (unless your surgeon or primary care doctor tells you to) for the 24 hours before and after surgery.    If you take prescribed drugs: Follow your doctor s orders about which medicines to take and which to stop until after surgery.    Eating and drinking prior to surgery: follow the instructions from your surgeon    Take a shower or bath the night before surgery. Use the soap your surgeon gave you to gently clean your skin. If you do not have soap from your surgeon, use your regular soap. Do not shave or scrub the surgery site.  Wear clean pajamas and have clean sheets on your bed.     Continue current medications.   Hold Celebrex -- for as long as your surgeon wants prior to surgery.   Bring your CPAP with you to surgery.     To help with weight loss and improve blood sugar control....    -- Try to avoid Carbohydrates as much as possible -- breads, pasta, baked goods, cakes, oatmeal, cold cereal, potatoes.   These are turned to sugar in one metabolic conversion, cause insulin secretion and increased fat deposition / weight gain.      -- Eat more lean meats, proteins, eggs, nuts.     Return as needed for follow-up with Dr. Johnston.    Clinic : [phone]  Appointment line: [phone]

## 2018-02-26 ENCOUNTER — TELEPHONE (OUTPATIENT)
Dept: INTERNAL MEDICINE | Facility: OTHER | Age: 48
End: 2018-02-26

## 2018-02-26 DIAGNOSIS — M18.0 ARTHRITIS OF CARPOMETACARPAL (CMC) JOINTS OF BOTH THUMBS: Primary | ICD-10-CM

## 2018-02-26 NOTE — TELEPHONE ENCOUNTER
Patient is requesting refill of Celebrex. Stated that he forgot to ask for a refill during his pre-op appointment on 2/21.     Mynor José LPN 02/26/18 2:42 PM

## 2018-02-27 ENCOUNTER — TRANSFERRED RECORDS (OUTPATIENT)
Dept: HEALTH INFORMATION MANAGEMENT | Facility: OTHER | Age: 48
End: 2018-02-27

## 2018-02-27 RX ORDER — CELECOXIB 200 MG/1
200 CAPSULE ORAL DAILY
Qty: 90 CAPSULE | Refills: 3 | Status: SHIPPED | OUTPATIENT
Start: 2018-02-27 | End: 2018-05-08

## 2018-03-02 ENCOUNTER — TELEPHONE (OUTPATIENT)
Dept: INTERNAL MEDICINE | Facility: OTHER | Age: 48
End: 2018-03-02

## 2018-03-02 DIAGNOSIS — F32.A ANXIETY AND DEPRESSION: ICD-10-CM

## 2018-03-02 DIAGNOSIS — F41.9 ANXIETY AND DEPRESSION: ICD-10-CM

## 2018-03-02 RX ORDER — CITALOPRAM HYDROBROMIDE 20 MG/1
TABLET ORAL
Qty: 60 TABLET | Refills: 0 | Status: SHIPPED | OUTPATIENT
Start: 2018-03-02 | End: 2018-05-07

## 2018-03-02 NOTE — TELEPHONE ENCOUNTER
Patricia at [facility] is withdrawing the PA request for celecoxib (CELEBREX) 200 MG capsule.  The patient would have to try and fail Celexa, Paxil & Zoloft (not at the same time).  If your nurse could contact the patient with whatever medication you are prescribing, that would be great.  Indira Frederick on 3/2/2018 at 11:51 AM

## 2018-03-05 ENCOUNTER — TRANSFERRED RECORDS (OUTPATIENT)
Dept: HEALTH INFORMATION MANAGEMENT | Facility: OTHER | Age: 48
End: 2018-03-05

## 2018-03-14 ENCOUNTER — TELEPHONE (OUTPATIENT)
Dept: INTERNAL MEDICINE | Facility: OTHER | Age: 48
End: 2018-03-14

## 2018-03-14 NOTE — TELEPHONE ENCOUNTER
Left message for [facility] to call back about a PA for Effexor and Celebrex.      Reanna Cedeno LPN 3/14/2018 4:40 PM

## 2018-03-15 NOTE — TELEPHONE ENCOUNTER
Per [facility] indicates that if patient is suppose to be on Lexapro, a new PA is needed.  Patient requesting Effexor to have on hand and a new PA will also need to be completed, unsure with strength patient is suppose to have.      Reanna Cedeno LPN 3/15/2018 11:56 AM

## 2018-03-15 NOTE — TELEPHONE ENCOUNTER
Patient informed that Celebrex went through the insurance per [facility], patient should be able to get this filled based on the conversation Patricia from [facility] had with [facility] pharmacist.  Patient reports that he is completed weaned off of the Effexor and would like to try more Citalopram at this time to see how it makes him feel.  Patient reports that he will call back in the next 1-2 weeks with an update.        Reanna Cedeno LPN 3/15/2018 2:16 PM

## 2018-03-15 NOTE — TELEPHONE ENCOUNTER
Patricia from [facility] looked into the Celebrex and Effexor, she reports that she will call the pharmacy at this time for further information and will call this writer back.      Reanna Cedeno LPN 3/15/2018 9:02 AM

## 2018-03-20 DIAGNOSIS — E11.9 TYPE 2 DIABETES MELLITUS WITHOUT COMPLICATION, UNSPECIFIED LONG TERM INSULIN USE STATUS: ICD-10-CM

## 2018-03-20 DIAGNOSIS — T38.0X5A STEROID-INDUCED HYPERGLYCEMIA: ICD-10-CM

## 2018-03-20 DIAGNOSIS — R73.9 STEROID-INDUCED HYPERGLYCEMIA: ICD-10-CM

## 2018-03-23 RX ORDER — GLIPIZIDE 5 MG/1
TABLET ORAL
Qty: 180 TABLET | Refills: 0 | Status: SHIPPED | OUTPATIENT
Start: 2018-03-23 | End: 2018-05-08

## 2018-03-30 ENCOUNTER — TRANSFERRED RECORDS (OUTPATIENT)
Dept: HEALTH INFORMATION MANAGEMENT | Facility: OTHER | Age: 48
End: 2018-03-30

## 2018-04-02 DIAGNOSIS — G89.29 OTHER CHRONIC PAIN: Primary | ICD-10-CM

## 2018-04-05 RX ORDER — BUPROPION HYDROCHLORIDE 150 MG/1
TABLET, EXTENDED RELEASE ORAL
Refills: 3 | COMMUNITY
Start: 2017-10-28 | End: 2018-05-11

## 2018-04-05 RX ORDER — BACLOFEN 10 MG/1
TABLET ORAL
Refills: 2 | COMMUNITY
Start: 2017-06-20 | End: 2018-05-11

## 2018-04-05 RX ORDER — PEN NEEDLE, DIABETIC 32GX 5/32"
NEEDLE, DISPOSABLE MISCELLANEOUS
Refills: 3 | COMMUNITY
Start: 2017-04-21 | End: 2018-05-11

## 2018-04-05 RX ORDER — LANCETS
EACH MISCELLANEOUS
Refills: 11 | COMMUNITY
Start: 2018-03-23 | End: 2018-08-09

## 2018-04-05 RX ORDER — ATORVASTATIN CALCIUM 40 MG/1
TABLET, FILM COATED ORAL
Refills: 11 | COMMUNITY
Start: 2017-07-27 | End: 2018-05-11

## 2018-04-05 RX ORDER — HYDROXYZINE PAMOATE 25 MG/1
CAPSULE ORAL
Refills: 3 | COMMUNITY
Start: 2017-10-10 | End: 2018-05-11

## 2018-04-05 RX ORDER — CLONIDINE HYDROCHLORIDE 0.1 MG/1
TABLET ORAL
Refills: 0 | COMMUNITY
Start: 2017-10-09 | End: 2018-05-11

## 2018-04-05 RX ORDER — GABAPENTIN 300 MG/1
CAPSULE ORAL
COMMUNITY
Start: 2018-02-28 | End: 2018-05-11

## 2018-04-05 RX ORDER — GABAPENTIN 100 MG/1
CAPSULE ORAL
Refills: 1 | COMMUNITY
Start: 2017-12-05 | End: 2018-10-08

## 2018-04-05 RX ORDER — ACETAMINOPHEN AND CODEINE PHOSPHATE 300; 60 MG/1; MG/1
TABLET ORAL
Refills: 0 | COMMUNITY
Start: 2017-10-04 | End: 2018-05-11

## 2018-04-05 RX ORDER — NAPROXEN 500 MG/1
TABLET ORAL
Refills: 11 | COMMUNITY
Start: 2017-07-27 | End: 2018-05-11

## 2018-04-05 NOTE — TELEPHONE ENCOUNTER
This is a Refill request from: [facility]  Name of Medication and Dose:  MAPAP 500 MG tablet  Quantity requested:  240  Last fill date: 4/12/2017  Last Office Visit: 2/21/2018  Narcotic Agreement Signed:  Unknown  PCP:  alban Johnston  Associated Diagnosis: chronic pain of both knees    Akanksha Kelly LPN Supervisor 4/5/2018   7:46 AM

## 2018-05-07 DIAGNOSIS — M18.0 ARTHRITIS OF CARPOMETACARPAL (CMC) JOINTS OF BOTH THUMBS: ICD-10-CM

## 2018-05-07 DIAGNOSIS — E11.9 TYPE 2 DIABETES MELLITUS WITHOUT COMPLICATION, UNSPECIFIED LONG TERM INSULIN USE STATUS: ICD-10-CM

## 2018-05-07 DIAGNOSIS — F41.9 ANXIETY AND DEPRESSION: Primary | ICD-10-CM

## 2018-05-07 DIAGNOSIS — F32.A ANXIETY AND DEPRESSION: Primary | ICD-10-CM

## 2018-05-08 RX ORDER — CITALOPRAM HYDROBROMIDE 20 MG/1
20 TABLET ORAL DAILY
Qty: 90 TABLET | Refills: 3 | Status: SHIPPED | OUTPATIENT
Start: 2018-05-08 | End: 2018-05-11

## 2018-05-08 RX ORDER — CELECOXIB 200 MG/1
200 CAPSULE ORAL DAILY
Qty: 90 CAPSULE | Refills: 3 | Status: SHIPPED | OUTPATIENT
Start: 2018-05-08 | End: 2019-01-15 | Stop reason: DRUGHIGH

## 2018-05-08 RX ORDER — GLIPIZIDE 5 MG/1
5-10 TABLET ORAL 3 TIMES DAILY
Qty: 540 TABLET | Refills: 3 | Status: SHIPPED | OUTPATIENT
Start: 2018-05-08 | End: 2019-01-15

## 2018-05-08 NOTE — TELEPHONE ENCOUNTER
Last office visit 2-21-18, no future one at this time.    Orders pending.      Reanna Cedeno LPN 5/8/2018 1:38 PM

## 2018-05-11 ENCOUNTER — TELEPHONE (OUTPATIENT)
Dept: INTERNAL MEDICINE | Facility: OTHER | Age: 48
End: 2018-05-11

## 2018-05-11 NOTE — TELEPHONE ENCOUNTER
Patient's wife clarified medication changes. Transferred to make appointment with Dr Johnston next week per Dr Lowry.  Janey Chong LPN ....................5/11/2018   4:18 PM

## 2018-05-11 NOTE — TELEPHONE ENCOUNTER
"This message was sent under spouse's MyChart:      \"Milton is having problems with his blood sugar level last few days. today at 10 it was 160 ate a late breakfast (toast, banana, sm piece of quiche) took his 2 Rx pills;  at 2 it dropped to 82 became dizzy, woozie not well. Gave him OJ, banana, veggies just came up to 151 at 4pm.   Whats up???? \"      Did let her know that PCP is unavailable and will send to another covering provider. Or does this need to wait for PCP?  Milly Burciaga LPN...................5/11/2018   2:40 PM   "

## 2018-05-11 NOTE — TELEPHONE ENCOUNTER
It would recommend that he hold his glipizide, take the tanzeum and monitor his blood sugars through the weekend.  Please clarify his med list while you have someone on the phone as there is several duplicates and various doses.

## 2018-05-15 ENCOUNTER — OFFICE VISIT (OUTPATIENT)
Dept: INTERNAL MEDICINE | Facility: OTHER | Age: 48
End: 2018-05-15
Attending: INTERNAL MEDICINE
Payer: COMMERCIAL

## 2018-05-15 VITALS
BODY MASS INDEX: 7233.39 KG/M2 | WEIGHT: 315 LBS | DIASTOLIC BLOOD PRESSURE: 70 MMHG | HEART RATE: 80 BPM | SYSTOLIC BLOOD PRESSURE: 134 MMHG

## 2018-05-15 DIAGNOSIS — E11.9 CONTROLLED TYPE 2 DIABETES MELLITUS WITHOUT COMPLICATION, WITHOUT LONG-TERM CURRENT USE OF INSULIN (H): Primary | ICD-10-CM

## 2018-05-15 DIAGNOSIS — E78.2 MIXED HYPERLIPIDEMIA: ICD-10-CM

## 2018-05-15 DIAGNOSIS — E66.01 MORBID OBESITY WITH BMI OF 45.0-49.9, ADULT (H): ICD-10-CM

## 2018-05-15 DIAGNOSIS — G47.33 OSA ON CPAP: ICD-10-CM

## 2018-05-15 DIAGNOSIS — E55.9 VITAMIN D DEFICIENCY: ICD-10-CM

## 2018-05-15 DIAGNOSIS — M18.0 ARTHRITIS OF CARPOMETACARPAL (CMC) JOINTS OF BOTH THUMBS: ICD-10-CM

## 2018-05-15 LAB
ALBUMIN SERPL-MCNC: 4.4 G/DL (ref 3.5–5.7)
ALBUMIN UR-MCNC: NEGATIVE MG/DL
ALP SERPL-CCNC: 41 U/L (ref 34–104)
ALT SERPL W P-5'-P-CCNC: 39 U/L (ref 7–52)
ANION GAP SERPL CALCULATED.3IONS-SCNC: 9 MMOL/L (ref 3–14)
APPEARANCE UR: CLEAR
AST SERPL W P-5'-P-CCNC: 32 U/L (ref 13–39)
BILIRUB SERPL-MCNC: 1.2 MG/DL (ref 0.3–1)
BILIRUB UR QL STRIP: NEGATIVE
BUN SERPL-MCNC: 15 MG/DL (ref 7–25)
CALCIUM SERPL-MCNC: 9.5 MG/DL (ref 8.6–10.3)
CHLORIDE SERPL-SCNC: 105 MMOL/L (ref 98–107)
CHOLEST SERPL-MCNC: 203 MG/DL
CO2 SERPL-SCNC: 25 MMOL/L (ref 21–31)
COLOR UR AUTO: YELLOW
CREAT SERPL-MCNC: 0.72 MG/DL (ref 0.7–1.3)
CREAT UR-MCNC: 178 MG/DL
ERYTHROCYTE [DISTWIDTH] IN BLOOD BY AUTOMATED COUNT: 14.6 % (ref 10–15)
GFR SERPL CREATININE-BSD FRML MDRD: >90 ML/MIN/1.7M2
GLUCOSE SERPL-MCNC: 153 MG/DL (ref 70–105)
GLUCOSE UR STRIP-MCNC: NEGATIVE MG/DL
HBA1C MFR BLD: 7.8 % (ref 4–6)
HCT VFR BLD AUTO: 47.8 % (ref 40–53)
HDLC SERPL-MCNC: 39 MG/DL (ref 23–92)
HGB BLD-MCNC: 16.2 G/DL (ref 13.3–17.7)
HGB UR QL STRIP: NEGATIVE
KETONES UR STRIP-MCNC: NEGATIVE MG/DL
LDLC SERPL CALC-MCNC: 132 MG/DL
LEUKOCYTE ESTERASE UR QL STRIP: NEGATIVE
MCH RBC QN AUTO: 27 PG (ref 26.5–33)
MCHC RBC AUTO-ENTMCNC: 33.9 G/DL (ref 31.5–36.5)
MCV RBC AUTO: 80 FL (ref 78–100)
MICROALBUMIN UR-MCNC: 8 MG/L
MICROALBUMIN/CREAT UR: 4.6 MG/G CR (ref 0–17)
NITRATE UR QL: NEGATIVE
NONHDLC SERPL-MCNC: 164 MG/DL
PH UR STRIP: 5.5 PH (ref 5–7)
PLATELET # BLD AUTO: 170 10E9/L (ref 150–450)
POTASSIUM SERPL-SCNC: 4 MMOL/L (ref 3.5–5.1)
PROT SERPL-MCNC: 7.3 G/DL (ref 6.4–8.9)
RBC # BLD AUTO: 6 10E12/L (ref 4.4–5.9)
SODIUM SERPL-SCNC: 139 MMOL/L (ref 134–144)
SOURCE: NORMAL
SP GR UR STRIP: 1.02 (ref 1–1.03)
TRIGL SERPL-MCNC: 160 MG/DL
UROBILINOGEN UR STRIP-ACNC: 0.2 EU/DL (ref 0.2–1)
WBC # BLD AUTO: 5 10E9/L (ref 4–11)

## 2018-05-15 PROCEDURE — G0463 HOSPITAL OUTPT CLINIC VISIT: HCPCS

## 2018-05-15 PROCEDURE — 85027 COMPLETE CBC AUTOMATED: CPT | Performed by: INTERNAL MEDICINE

## 2018-05-15 PROCEDURE — 83036 HEMOGLOBIN GLYCOSYLATED A1C: CPT | Performed by: INTERNAL MEDICINE

## 2018-05-15 PROCEDURE — 82043 UR ALBUMIN QUANTITATIVE: CPT | Performed by: INTERNAL MEDICINE

## 2018-05-15 PROCEDURE — 80053 COMPREHEN METABOLIC PANEL: CPT | Performed by: INTERNAL MEDICINE

## 2018-05-15 PROCEDURE — 80061 LIPID PANEL: CPT | Performed by: INTERNAL MEDICINE

## 2018-05-15 PROCEDURE — 36415 COLL VENOUS BLD VENIPUNCTURE: CPT | Performed by: INTERNAL MEDICINE

## 2018-05-15 PROCEDURE — 99214 OFFICE O/P EST MOD 30 MIN: CPT | Performed by: INTERNAL MEDICINE

## 2018-05-15 PROCEDURE — 81003 URINALYSIS AUTO W/O SCOPE: CPT | Performed by: INTERNAL MEDICINE

## 2018-05-15 ASSESSMENT — ANXIETY QUESTIONNAIRES
1. FEELING NERVOUS, ANXIOUS, OR ON EDGE: NOT AT ALL
6. BECOMING EASILY ANNOYED OR IRRITABLE: NOT AT ALL
5. BEING SO RESTLESS THAT IT IS HARD TO SIT STILL: NOT AT ALL
3. WORRYING TOO MUCH ABOUT DIFFERENT THINGS: NOT AT ALL
GAD7 TOTAL SCORE: 0
IF YOU CHECKED OFF ANY PROBLEMS ON THIS QUESTIONNAIRE, HOW DIFFICULT HAVE THESE PROBLEMS MADE IT FOR YOU TO DO YOUR WORK, TAKE CARE OF THINGS AT HOME, OR GET ALONG WITH OTHER PEOPLE: NOT DIFFICULT AT ALL
7. FEELING AFRAID AS IF SOMETHING AWFUL MIGHT HAPPEN: NOT AT ALL
2. NOT BEING ABLE TO STOP OR CONTROL WORRYING: NOT AT ALL

## 2018-05-15 ASSESSMENT — ENCOUNTER SYMPTOMS
AGITATION: 0
VOMITING: 0
PALPITATIONS: 0
MYALGIAS: 0
DIZZINESS: 0
ARTHRALGIAS: 1
WHEEZING: 0
EYE PAIN: 0
COUGH: 0
DIARRHEA: 0
CHILLS: 0
FATIGUE: 0
ABDOMINAL PAIN: 0
FEVER: 0
NAUSEA: 0
BRUISES/BLEEDS EASILY: 0
DYSURIA: 0
CONFUSION: 0
SHORTNESS OF BREATH: 0
HEMATURIA: 0
LIGHT-HEADEDNESS: 0

## 2018-05-15 ASSESSMENT — PATIENT HEALTH QUESTIONNAIRE - PHQ9: 5. POOR APPETITE OR OVEREATING: NOT AT ALL

## 2018-05-15 ASSESSMENT — PAIN SCALES - GENERAL: PAINLEVEL: MODERATE PAIN (5)

## 2018-05-15 NOTE — PROGRESS NOTES
Nursing Notes:   Reanna Cedeno LPN  5/15/2018 11:06 AM  Signed  Patient presents to the clinic for   Previous A1C is at goal of <8  No results found for: A1C  Urine microalbumin:creatine: n/a  Foot exam unknown-decline  Eye exam 05/2018    Tobacco User no  Patient is not on a daily aspirin  Patient is not on a Statin.  Blood pressure today of:     BP Readings from Last 1 Encounters:   02/21/18 130/88      is at the goal of <139/89 for diabetics.    Reanna Cedeno LPN on 5/15/2018 at 10:48 AM      Nursing note reviewed with patient.  Accurracy and completeness verified.   Mr. Puentes is a 47 year old male who:  Patient presents with:  Diabetes    HPI     ICD-10-CM    1. Controlled type 2 diabetes mellitus without complication, without long-term current use of insulin (H) E11.9 Albumin Random Urine Quantitative with Creat Ratio     Comprehensive metabolic panel     CBC with platelets     *UA reflex to Microscopic     Hemoglobin A1c     aspirin 81 MG tablet     Comprehensive metabolic panel     CBC with platelets     Hemoglobin A1c     Albumin Random Urine Quantitative with Creat Ratio     *UA reflex to Microscopic   2. Morbid obesity with BMI of 45.0-49.9, adult (H) E66.01     Z68.42    3. Mixed hyperlipidemia E78.2 Lipid Profile     Lipid Profile   4. GWEN on CPAP - uses nightly, finds helpful. currently 15 cm H20 - will increase to 18 cm H20 5/15/2018 G47.33 Sleep DME    Z99.89    5. Arthritis of carpometacarpal (CMC) joints of both thumbs M18.0    6. Vitamin D deficiency E55.9 Cholecalciferol (VITAMIN D-3) 5000 units TABS     Diabetes, has been controlled.  States that he has been cutting down on his medications because his blood sugars were getting low.  Reports they have been running anywhere from 82 up to 150.  He has lost about 5 pounds in the last 3 months due to dietary changes.  Was drinking a lot of milk, cold cereal and flour products, pop.  He has cut out most of these from his diet.  Due for labs  today.    Obesity, discussed need for reduced oral caloric intake.  Regular exercise.  Reduce carbohydrate intake.  Information printed and reviewed.    Hyperlipidemia, declines statins.  Check labs.    Sleep apnea, needs increased pressures on a CPAP machine.  Prescription updated.    Bilateral thumb joint arthritis.  Has had surgery, reports more pain in the left hand than his right hand but overall is doing much better than prior to surgery.    Vitamin D deficiency, no longer taking vitamin D replacement.  Last levels were low.  Refill sent to pharmacy.    Functional Capacity: > 4 METS.   Reports that he can climb a flight of stairs without any chest pain/heaviness or shortness of breath.   No orthopnea/paroxysmal nocturnal dyspnea  Review of Systems   Constitutional: Negative for chills, fatigue and fever.   HENT: Negative for congestion and hearing loss.    Eyes: Negative for pain and visual disturbance.   Respiratory: Negative for cough, shortness of breath and wheezing.    Cardiovascular: Negative for chest pain and palpitations.   Gastrointestinal: Negative for abdominal pain, diarrhea, nausea and vomiting.   Endocrine: Negative for cold intolerance and heat intolerance.   Genitourinary: Negative for dysuria and hematuria.   Musculoskeletal: Positive for arthralgias. Negative for myalgias.   Skin: Negative for pallor.   Allergic/Immunologic: Negative for immunocompromised state.   Neurological: Negative for dizziness and light-headedness.   Hematological: Does not bruise/bleed easily.   Psychiatric/Behavioral: Negative for agitation and confusion.        ASHVIN:   ASHVIN-7 SCORE 10/4/2017 10/10/2017 5/15/2018   Total Score 0 0 0     PHQ9:  PHQ-9 SCORE 10/10/2017 12/12/2017 5/15/2018   Total Score 0 0 0       I have personally reviewed the past medical history, past surgical history, medications, allergies, family and social history as listed below, on 5/15/2018.    Allergies   Allergen Reactions     Ace Inhibitors  Other (See Comments)     - Declined     Hmg-Coa-R Inhibitors Other (See Comments)     -- declines at this time, re-address at next clinic appointment --     Metformin Other (See Comments)     Felt very foggy / groggy after taking, when in combination with Robaxin.        Current Outpatient Prescriptions   Medication Sig Dispense Refill     aspirin 81 MG tablet Take 1 tablet (81 mg) by mouth daily -- ONCE weekly -- at most 90 tablet 3     blood glucose (NO BRAND SPECIFIED) lancets standard Dispense item covered by pt ins. E11.65 NIDDM type II, uncontrolled - Test 3 times/day, Reason: High A1C, new start Glipizide       blood glucose monitoring (ACCU-CHEK FASTCLIX) lancets USE TO TEST TID  11     blood glucose monitoring (NO BRAND SPECIFIED) test strip Dispense item covered by pt ins. E11.9 NIDDM type II - Test 3 times/day, Reason: High A1C       Blood Glucose Monitoring Suppl (FIFTY50 GLUCOSE METER 2.0) W/DEVICE KIT Dispense item covered by pt ins. E11.65 NIDDM type II, uncontrolled - Test 3 time/day       celecoxib (CELEBREX) 200 MG capsule Take 1 capsule (200 mg) by mouth daily -- With food as needed for arthritis pain 90 capsule 3     Cholecalciferol (VITAMIN D-3) 5000 units TABS Take 5,000 Units by mouth daily 100 tablet 3     gabapentin (NEURONTIN) 100 MG capsule TK 1-3 CS PO QID PRF BURNING OR SHOOTING NERVE PAIN  1     glipiZIDE (GLUCOTROL) 5 MG tablet Take 1-2 tablets (5-10 mg) by mouth 3 times daily 540 tablet 3        Patient Active Problem List    Diagnosis Date Noted     Arthritis of carpometacarpal (CMC) joints of both thumbs 08/17/2017     Priority: Medium     Controlled type 2 diabetes mellitus without complication, without long-term current use of insulin (H) 07/27/2017     Priority: Medium     Lumbar spinal stenosis 05/01/2017     Priority: Medium     Anxiety and depression 11/01/2016     Priority: Medium     Chronic midline low back pain with left-sided sciatica 11/01/2016     Priority: Medium      Chronic pain of right knee 11/01/2016     Priority: Medium     Overview:   Updated per 10/1/17 IMO import  Overview:   Updated per 10/1/17 IMO import       Cyst of left kidney 11/01/2016     Priority: Medium     Mixed hyperlipidemia 11/01/2016     Priority: Medium     Morbid obesity with BMI of 45.0-49.9, adult (H) 11/01/2016     Priority: Medium     GWEN on CPAP - uses nightly, finds helpful. currently 15 cm H20 - will increase to 18 cm H20 5/15/2018 11/01/2016     Priority: Medium     Achilles tendonitis, bilateral 07/22/2016     Priority: Medium     Health care maintenance 07/01/2016     Priority: Medium     Overview:   Colonoscopy: Age 50  ITALO/PSA: Given family history of early prostate disease, would check periodically to monitor for changes; last in 08/2015, recheck in 1-3 years  AAA screen: Not indicated  Immunizations: UTD on other vaccines.   Lipids/Annual Exam: Done 07/2016, repeat as needed for cholesterol management  Hepatitis C screen: not indicated       Vitamin D deficiency 09/23/2015     Priority: Medium     Family hx of prostate cancer 08/17/2015     Priority: Medium     Osteoarthritis of multiple joints 03/05/2015     Priority: Medium     Past Medical History:   Diagnosis Date     Achilles tendinitis of left lower extremity     7/22/2016     Dorsalgia     No Comments Provided     Obstructive sleep apnea     using nasal pillows; sleeping 8 hours     Other specified anxiety disorders     No Comments Provided     Polyosteoarthritis     has had injections     Past Surgical History:   Procedure Laterality Date     TONSILLECTOMY      as a child     Social History     Social History     Marital status:      Spouse name: N/A     Number of children: N/A     Years of education: N/A     Social History Main Topics     Smoking status: Never Smoker     Smokeless tobacco: Never Used     Alcohol use 0.0 oz/week      Comment: Alcoholic Drinks/day: rarely     Drug use: No     Sexual activity: Yes     Partners:  "Female     Other Topics Concern     None     Social History Narrative    Originally from [place]; moved back to [facility] in 2012 after living there for another 3-4 years with wife; , no children.  Laid off in winter 2015 from [employer] business.  Going to school in [place] for [employer] certification.     Family History   Problem Relation Age of Onset     Other - See Comments Father      Psychiatric illness     Other - See Comments Paternal Grandfather      Psychiatric illness     Other - See Comments Paternal Uncle      Psychiatric illness       EXAM:   Vitals:    05/15/18 1055   BP: 134/70   BP Location: Right arm   Patient Position: Sitting   Cuff Size: Adult Regular   Pulse: 80   Weight: (!) 370 lb 6 oz (168 kg)       Current Pain Score: Moderate Pain (5)     BP Readings from Last 3 Encounters:   05/15/18 134/70   02/21/18 130/88   12/12/17 138/88    Wt Readings from Last 3 Encounters:   05/15/18 (!) 370 lb 6 oz (168 kg)   02/21/18 (!) 375 lb (170.1 kg)   12/12/17 (!) 373 lb 6 oz (169.4 kg)      Estimated body mass index is 7,233.39 kg/(m^2) as calculated from the following:    Height as of 2/21/18: 6\" (0.152 m).    Weight as of this encounter: 370 lb 6 oz (168 kg).     Physical Exam   Constitutional: He appears well-developed and well-nourished.   HENT:   Head: Normocephalic and atraumatic.   Eyes: Conjunctivae are normal. No scleral icterus.   Cardiovascular: Normal rate and regular rhythm.    Pulmonary/Chest: Effort normal.   Abdominal: Soft.   Obesity   Musculoskeletal: He exhibits edema and deformity. He exhibits no tenderness.   Lymphadenopathy:     He has no cervical adenopathy.   Neurological: He is alert.   Skin: Skin is warm and dry.   Psychiatric: He has a normal mood and affect.       INVESTIGATIONS:  Results for orders placed or performed in visit on 05/15/18   Comprehensive metabolic panel   Result Value Ref Range    Sodium 139 134 - 144 mmol/L    Potassium 4.0 3.5 - 5.1 mmol/L    Chloride 105 98 - " 107 mmol/L    Carbon Dioxide 25 21 - 31 mmol/L    Anion Gap 9 3 - 14 mmol/L    Glucose 153 (H) 70 - 105 mg/dL    Urea Nitrogen 15 7 - 25 mg/dL    Creatinine 0.72 0.70 - 1.30 mg/dL    GFR Estimate >90 >60 mL/min/1.7m2    GFR Estimate If Black >90 >60 mL/min/1.7m2    Calcium 9.5 8.6 - 10.3 mg/dL    Bilirubin Total 1.2 (H) 0.3 - 1.0 mg/dL    Albumin 4.4 3.5 - 5.7 g/dL    Protein Total 7.3 6.4 - 8.9 g/dL    Alkaline Phosphatase 41 34 - 104 U/L    ALT 39 7 - 52 U/L    AST 32 13 - 39 U/L   CBC with platelets   Result Value Ref Range    WBC 5.0 4.0 - 11.0 10e9/L    RBC Count 6.00 (H) 4.4 - 5.9 10e12/L    Hemoglobin 16.2 13.3 - 17.7 g/dL    Hematocrit 47.8 40.0 - 53.0 %    MCV 80 78 - 100 fl    MCH 27.0 26.5 - 33.0 pg    MCHC 33.9 31.5 - 36.5 g/dL    RDW 14.6 10.0 - 15.0 %    Platelet Count 170 150 - 450 10e9/L   Lipid Profile   Result Value Ref Range    Cholesterol 203 (H) <200 mg/dL    Triglycerides 160 (H) <150 mg/dL    HDL Cholesterol 39 23 - 92 mg/dL    LDL Cholesterol Calculated 132 (H) <100 mg/dL    Non HDL Cholesterol 164 (H) <130 mg/dL   Hemoglobin A1c   Result Value Ref Range    Hemoglobin A1C 7.8 (H) 4.0 - 6.0 %   *UA reflex to Microscopic   Result Value Ref Range    Color Urine Yellow     Appearance Urine Clear     Glucose Urine Negative NEG^Negative mg/dL    Bilirubin Urine Negative NEG^Negative    Ketones Urine Negative NEG^Negative mg/dL    Specific Gravity Urine 1.025 1.003 - 1.035    Blood Urine Negative NEG^Negative    pH Urine 5.5 5.0 - 7.0 pH    Protein Albumin Urine Negative NEG^Negative mg/dL    Urobilinogen Urine 0.2 0.2 - 1.0 EU/dL    Nitrite Urine Negative NEG^Negative    Leukocyte Esterase Urine Negative NEG^Negative    Source Unspecified Urine        ASSESSMENT AND PLAN:  Problem List Items Addressed This Visit        Respiratory    GWEN on CPAP - uses nightly, finds helpful. currently 15 cm H20 - will increase to 18 cm H20 5/15/2018    Relevant Orders    Sleep DME (Completed)       Digestive     Morbid obesity with BMI of 45.0-49.9, adult (H)    Vitamin D deficiency    Relevant Medications    Cholecalciferol (VITAMIN D-3) 5000 units TABS       Endocrine    Controlled type 2 diabetes mellitus without complication, without long-term current use of insulin (H) - Primary    Relevant Medications    aspirin 81 MG tablet    Other Relevant Orders    Albumin Random Urine Quantitative with Creat Ratio    Comprehensive metabolic panel    CBC with platelets    *UA reflex to Microscopic    Hemoglobin A1c    Mixed hyperlipidemia    Relevant Orders    Lipid Profile       Musculoskeletal and Integumentary    Arthritis of carpometacarpal (CMC) joints of both thumbs    Relevant Medications    aspirin 81 MG tablet        reviewed diet, exercise and weight control, recommended sodium restriction, specific diabetic recommendations low cholesterol diet, weight control and daily exercise discussed and home glucose monitoring taught, technique demonstrated  -- Expected clinical course discussed    -- Medications and their side effects discussed    The 10-year ASCVD risk score (Liam COLLADO Jr, et al., 2013) is: 7.1%    Values used to calculate the score:      Age: 47 years      Sex: Male      Is Non- : No      Diabetic: Yes      Tobacco smoker: No      Systolic Blood Pressure: 134 mmHg      Is BP treated: No      HDL Cholesterol: 39 mg/dL      Total Cholesterol: 203 mg/dL    Patient Instructions   Diabetic labs today.     Restart Vitamin D - refill sent to pharmacy.     Return for Diabetes labs and clinic follow-up appointment every 3 to 4 months.  --- (Go for about 91 to 100 days)    To help with weight loss and improve blood sugar control....    -- Try to avoid Carbohydrates as much as possible -- breads, pasta, baked goods, cakes, oatmeal, cold cereal, potatoes.   These are turned to sugar in one metabolic conversion, cause insulin secretion and increased fat deposition / weight gain.      -- Eat more lean  meats, proteins, eggs, nuts.       -- Trying to exercise daily (goal at least 20 min/day) with moderate aerobic activity   -- Eat healthy (resources from ADA at http://www.diabetes.org/)   -- Taking good care of my feet. Consider seeing the Podiatrist   -- Check blood sugars as directed, record in log book and bring to every appointment      Schedule lab only appointment --- A few days AFTER: 8/13/18   Schedule clinic appointment with Dr. Johnston -- Same day as labs, or 1-2 days later.     Insurance companies are now grading you and I on your blood sugar control -- Goal is to get your A1c down to 7.9% or lower and NO Smoking!    -- Medicare and most insurance companies, will only cover Hemoglobin A1c labs to be rechecked every 91+ days.      No results found for: A1C    Next follow-up appointment with Dr. Johnston should be scheduled:  -- Approximately a few days AFTER: 8/13/18       Scott Johnston MD  Internal Medicine  [facility]

## 2018-05-15 NOTE — MR AVS SNAPSHOT
After Visit Summary   5/15/2018    Shelton Puentes    MRN: [id number]           Patient Information     Date Of Birth          1970        Visit Information        Provider Department      5/15/2018 10:40 AM Scott Johnston MD [facility]        Today's Diagnoses     Controlled type 2 diabetes mellitus without complication, without long-term current use of insulin (H)    -  1    Morbid obesity with BMI of 45.0-49.9, adult (H)        Mixed hyperlipidemia        GWEN on CPAP - uses nightly, finds helpful. currently 15 cm H20 - will increase to 18 cm H20 5/15/2018        Arthritis of carpometacarpal (CMC) joints of both thumbs        Vitamin D deficiency          Care Instructions    Diabetic labs today.     Restart Vitamin D - refill sent to pharmacy.     Return for Diabetes labs and clinic follow-up appointment every 3 to 4 months.  --- (Go for about 91 to 100 days)    To help with weight loss and improve blood sugar control....    -- Try to avoid Carbohydrates as much as possible -- breads, pasta, baked goods, cakes, oatmeal, cold cereal, potatoes.   These are turned to sugar in one metabolic conversion, cause insulin secretion and increased fat deposition / weight gain.      -- Eat more lean meats, proteins, eggs, nuts.       -- Trying to exercise daily (goal at least 20 min/day) with moderate aerobic activity   -- Eat healthy (resources from ADA at http://www.diabetes.org/)   -- Taking good care of my feet. Consider seeing the Podiatrist   -- Check blood sugars as directed, record in log book and bring to every appointment      Schedule lab only appointment --- A few days AFTER: 8/13/18   Schedule clinic appointment with Dr. Johnston -- Same day as labs, or 1-2 days later.     Insurance companies are now grading you and I on your blood sugar control -- Goal is to get your A1c down to 7.9% or lower and NO Smoking!    -- Medicare and most insurance companies, will only  "cover Hemoglobin A1c labs to be rechecked every 91+ days.      No results found for: A1C    Next follow-up appointment with Dr. Johnston should be scheduled:  -- Approximately a few days AFTER: 8/13/18             Follow-ups after your visit        Future tests that were ordered for you today     Open Standing Orders        Priority Remaining Interval Expires Ordered    Albumin Random Urine Quantitative with Creat Ratio Routine 4/4 Every 3 Months 5/15/2019 5/15/2018    Comprehensive metabolic panel Routine 4/4 Every 3 Months 5/15/2019 5/15/2018    CBC with platelets Routine 4/4 Every 3 Months 5/15/2019 5/15/2018    *UA reflex to Microscopic Routine 4/4 Every 3 Months 5/15/2019 5/15/2018    Lipid Profile Routine 4/4 Every 3 Months 5/15/2019 5/15/2018    Hemoglobin A1c Routine 4/4 Every 3 Months 5/15/2019 5/15/2018            Who to contact     If you have questions or need follow up information about today's clinic visit or your schedule please contact [facility] directly at [phone].  Normal or non-critical lab and imaging results will be communicated to you by [employer], letter or phone within 4 business days after the clinic has received the results. If you do not hear from us within 7 days, please contact the clinic through L[employer] or phone. If you have a critical or abnormal lab result, we will notify you by phone as soon as possible.  Submit refill requests through [employer] or call your pharmacy and they will forward the refill request to us. Please allow 3 business days for your refill to be completed.          Additional Information About Your Visit        [employer] Information     [employer] lets you send messages to your doctor, view your test results, renew your prescriptions, schedule appointments and more. To sign up, go to [email or web address] . Click on \"Log in\" on the left side of the screen, which will take you to the Welcome page. Then click on \"Sign up Now\" on the right side of " the page.     You will be asked to enter the access code listed below, as well as some personal information. Please follow the directions to create your username and password.     Your access code is: [email or web address]  Expires: 2018  1:17 PM     Your access code will  in 90 days. If you need help or a new code, please call your [place] clinic or [phone].        Care EveryWhere ID     This is your Care EveryWhere ID. This could be used by other organizations to access your [place] medical records  [id number]        Your Vitals Were     Pulse BMI (Body Mass Index)                80 7,233.39 kg/m2           Blood Pressure from Last 3 Encounters:   05/15/18 134/70   18 130/88   17 138/88    Weight from Last 3 Encounters:   05/15/18 (!) 370 lb 6 oz (168 kg)   18 (!) 375 lb (170.1 kg)   17 (!) 373 lb 6 oz (169.4 kg)              We Performed the Following     Sleep DME          Today's Medication Changes          These changes are accurate as of 5/15/18 11:16 AM.  If you have any questions, ask your nurse or doctor.               Start taking these medicines.        Dose/Directions    aspirin 81 MG tablet   Used for:  Controlled type 2 diabetes mellitus without complication, without long-term current use of insulin (H)   Started by:  Scott Johnston MD        Dose:  81 mg   Take 1 tablet (81 mg) by mouth daily -- ONCE weekly -- at most   Quantity:  90 tablet   Refills:  3       Vitamin D-3 5000 units Tabs   Used for:  Vitamin D deficiency   Started by:  Scott Johnston MD        Dose:  5000 Units   Take 5,000 Units by mouth daily   Quantity:  100 tablet   Refills:  3            Where to get your medicines      These medications were sent to [facility] - [place], [place] -  AT [address] , [facility] [address]     Phone:  [phone]     Vitamin D-3 5000 units Tabs         Some of these will need a paper prescription and  others can be bought over the counter.  Ask your nurse if you have questions.     You don't need a prescription for these medications     aspirin 81 MG tablet                Primary Care Provider Office Phone # Fax #    Scott Johnston [phone] [address]   [facility] [address]        Equal Access to Services     [facility] Hospital[facility] : Hadii aad ku hadasho Soomaali, waaxda luqadaha, qaybta kaalmada adeegyada, sonido hernandez hayalexysn ruben becerrarebalianne pena . [facility] [phone].    ATENCIÓN: Si habla español, tiene a rueda disposición servicios gratuitos de asistencia lingüística. Llame al [phone].    We comply with applicable federal civil rights laws and [place] laws. We do not discriminate on the basis of race, color, national origin, age, disability, sex, sexual orientation, or gender identity.            Thank you!     Thank you for choosing [facility] [place]  for your care. Our goal is always to provide you with excellent care. Hearing back from our patients is one way we can continue to improve our services. Please take a few minutes to complete the written survey that you may receive in the mail after your visit with us. Thank you!             Your Updated Medication List - Protect others around you: Learn how to safely use, store and throw away your medicines at www.disposemymeds.org.          This list is accurate as of 5/15/18 11:16 AM.  Always use your most recent med list.                   Brand Name Dispense Instructions for use Diagnosis    aspirin 81 MG tablet     90 tablet    Take 1 tablet (81 mg) by mouth daily -- ONCE weekly -- at most    Controlled type 2 diabetes mellitus without complication, without long-term current use of insulin (H)       blood glucose lancets standard    no brand specified     Dispense item covered by pt ins. E11.65 NIDDM type II, uncontrolled - Test 3 times/day, Reason: High A1C, new start Glipizide        blood glucose monitoring  lancets      USE TO TEST TID        blood glucose monitoring test strip    no brand specified     Dispense item covered by pt ins. E11.9 NIDDM type II - Test 3 times/day, Reason: High A1C        celecoxib 200 MG capsule    celeBREX    90 capsule    Take 1 capsule (200 mg) by mouth daily -- With food as needed for arthritis pain    Arthritis of carpometacarpal (CMC) joints of both thumbs       FIFTY50 GLUCOSE METER 2.0 w/Device Kit      Dispense item covered by pt ins. E11.65 NIDDM type II, uncontrolled - Test 3 time/day        gabapentin 100 MG capsule    NEURONTIN     TK 1-3 CS PO QID PRF BURNING OR SHOOTING NERVE PAIN        glipiZIDE 5 MG tablet    GLUCOTROL    540 tablet    Take 1-2 tablets (5-10 mg) by mouth 3 times daily    Type 2 diabetes mellitus without complication, unspecified long term insulin use status (H)       Vitamin D-3 5000 units Tabs     100 tablet    Take 5,000 Units by mouth daily    Vitamin D deficiency

## 2018-05-15 NOTE — PATIENT INSTRUCTIONS
Diabetic labs today.     Restart Vitamin D - refill sent to pharmacy.     Return for Diabetes labs and clinic follow-up appointment every 3 to 4 months.  --- (Go for about 91 to 100 days)    To help with weight loss and improve blood sugar control....    -- Try to avoid Carbohydrates as much as possible -- breads, pasta, baked goods, cakes, oatmeal, cold cereal, potatoes.   These are turned to sugar in one metabolic conversion, cause insulin secretion and increased fat deposition / weight gain.      -- Eat more lean meats, proteins, eggs, nuts.       -- Trying to exercise daily (goal at least 20 min/day) with moderate aerobic activity   -- Eat healthy (resources from ADA at http://www.diabetes.org/)   -- Taking good care of my feet. Consider seeing the Podiatrist   -- Check blood sugars as directed, record in log book and bring to every appointment      Schedule lab only appointment --- A few days AFTER: 8/13/18   Schedule clinic appointment with Dr. Johnston -- Same day as labs, or 1-2 days later.     Insurance companies are now grading you and I on your blood sugar control -- Goal is to get your A1c down to 7.9% or lower and NO Smoking!    -- Medicare and most insurance companies, will only cover Hemoglobin A1c labs to be rechecked every 91+ days.      No results found for: A1C    Next follow-up appointment with Dr. Johnston should be scheduled:  -- Approximately a few days AFTER: 8/13/18

## 2018-05-15 NOTE — LETTER
Shelton Puentes  [address]   [facility] [address]    5/15/2018      Dear Shelton Puentes,    The result of your recent tests are included below:    Diabetes is controlled, but barely.    Continue your diabetic medications for now.      With further weight loss we likely can start getting rid of more medications.    Results for orders placed or performed in visit on 05/15/18   Comprehensive metabolic panel   Result Value Ref Range    Sodium 139 134 - 144 mmol/L    Potassium 4.0 3.5 - 5.1 mmol/L    Chloride 105 98 - 107 mmol/L    Carbon Dioxide 25 21 - 31 mmol/L    Anion Gap 9 3 - 14 mmol/L    Glucose 153 (H) 70 - 105 mg/dL    Urea Nitrogen 15 7 - 25 mg/dL    Creatinine 0.72 0.70 - 1.30 mg/dL    GFR Estimate >90 >60 mL/min/1.7m2    GFR Estimate If Black >90 >60 mL/min/1.7m2    Calcium 9.5 8.6 - 10.3 mg/dL    Bilirubin Total 1.2 (H) 0.3 - 1.0 mg/dL    Albumin 4.4 3.5 - 5.7 g/dL    Protein Total 7.3 6.4 - 8.9 g/dL    Alkaline Phosphatase 41 34 - 104 U/L    ALT 39 7 - 52 U/L    AST 32 13 - 39 U/L   CBC with platelets   Result Value Ref Range    WBC 5.0 4.0 - 11.0 10e9/L    RBC Count 6.00 (H) 4.4 - 5.9 10e12/L    Hemoglobin 16.2 13.3 - 17.7 g/dL    Hematocrit 47.8 40.0 - 53.0 %    MCV 80 78 - 100 fl    MCH 27.0 26.5 - 33.0 pg    MCHC 33.9 31.5 - 36.5 g/dL    RDW 14.6 10.0 - 15.0 %    Platelet Count 170 150 - 450 10e9/L   Lipid Profile   Result Value Ref Range    Cholesterol 203 (H) <200 mg/dL    Triglycerides 160 (H) <150 mg/dL    HDL Cholesterol 39 23 - 92 mg/dL    LDL Cholesterol Calculated 132 (H) <100 mg/dL    Non HDL Cholesterol 164 (H) <130 mg/dL   Hemoglobin A1c   Result Value Ref Range    Hemoglobin A1C 7.8 (H) 4.0 - 6.0 %   *UA reflex to Microscopic   Result Value Ref Range    Color Urine Yellow     Appearance Urine Clear     Glucose Urine Negative NEG^Negative mg/dL    Bilirubin Urine Negative NEG^Negative    Ketones Urine Negative NEG^Negative mg/dL    Specific Gravity Urine 1.025 1.003 -  1.035    Blood Urine Negative NEG^Negative    pH Urine 5.5 5.0 - 7.0 pH    Protein Albumin Urine Negative NEG^Negative mg/dL    Urobilinogen Urine 0.2 0.2 - 1.0 EU/dL    Nitrite Urine Negative NEG^Negative    Leukocyte Esterase Urine Negative NEG^Negative    Source Unspecified Urine        If you have any further questions or problems, please contact my office at [phone] and schedule an appointment.    Clinic : [phone]  Appointment line: [phone]     Thank you,    Scott Johnston MD    Internal Medicine  [facility]     Reviewed and electronically signed by provider.

## 2018-05-15 NOTE — NURSING NOTE
Patient presents to the clinic for   Previous A1C is at goal of <8  No results found for: A1C  Urine microalbumin:creatine: n/a  Foot exam unknown-decline  Eye exam 05/2018    Tobacco User no  Patient is not on a daily aspirin  Patient is not on a Statin.  Blood pressure today of:     BP Readings from Last 1 Encounters:   02/21/18 130/88      is at the goal of <139/89 for diabetics.    Reanna Cedeno LPN on 5/15/2018 at 10:48 AM

## 2018-05-16 ASSESSMENT — ANXIETY QUESTIONNAIRES: GAD7 TOTAL SCORE: 0

## 2018-05-16 ASSESSMENT — PATIENT HEALTH QUESTIONNAIRE - PHQ9: SUM OF ALL RESPONSES TO PHQ QUESTIONS 1-9: 0

## 2018-05-23 ENCOUNTER — MYC MEDICAL ADVICE (OUTPATIENT)
Dept: INTERNAL MEDICINE | Facility: OTHER | Age: 48
End: 2018-05-23

## 2018-05-23 ENCOUNTER — TELEPHONE (OUTPATIENT)
Dept: INTERNAL MEDICINE | Facility: OTHER | Age: 48
End: 2018-05-23

## 2018-05-23 RX ORDER — METHOCARBAMOL 750 MG/1
750 TABLET, FILM COATED ORAL 3 TIMES DAILY PRN
COMMUNITY
End: 2018-08-09

## 2018-05-23 RX ORDER — CITALOPRAM HYDROBROMIDE 20 MG/1
20 TABLET ORAL DAILY
Refills: 3 | COMMUNITY
Start: 2018-05-08 | End: 2018-10-23

## 2018-05-23 NOTE — TELEPHONE ENCOUNTER
[facility] indicates that they would need a new prescription for increase in CPAP pressure.  Patient is currently at 15 cm and his machine will allow 20 cm.  [facility] is faxing over form for their documentation.      Reanna Cedeno LPN 5/23/2018 11:48 AM

## 2018-05-23 NOTE — TELEPHONE ENCOUNTER
Patient was instructed to have CPAP increased to 18.  This has not been done yet.    Reanna Cedeno LPN 5/23/2018 11:39 AM

## 2018-06-11 ENCOUNTER — MYC MEDICAL ADVICE (OUTPATIENT)
Dept: INTERNAL MEDICINE | Facility: OTHER | Age: 48
End: 2018-06-11

## 2018-06-11 DIAGNOSIS — M15.0 PRIMARY OSTEOARTHRITIS INVOLVING MULTIPLE JOINTS: ICD-10-CM

## 2018-06-11 DIAGNOSIS — M18.0 ARTHRITIS OF CARPOMETACARPAL (CMC) JOINTS OF BOTH THUMBS: Primary | ICD-10-CM

## 2018-06-19 NOTE — TELEPHONE ENCOUNTER
Patient has came to the facility without his wife several times since [facility] changes systems, spouse didn't notice the Celebrex prescription either.  Patient recently got a refill and it was indicated as once per day not two a day that was present at office visit on 12-12-17.    Order pending.      Reanna Cedeno LPN 6/19/2018 3:49 PM

## 2018-06-20 RX ORDER — CELECOXIB 200 MG/1
200 CAPSULE ORAL 2 TIMES DAILY PRN
Qty: 180 CAPSULE | Refills: 3 | Status: SHIPPED | OUTPATIENT
Start: 2018-06-20 | End: 2020-01-21

## 2018-06-20 NOTE — TELEPHONE ENCOUNTER
Prescription approved per MD note below.     Attempted to reach Patient's wife to notify her of this. Left message on machine to call back. Maxine Cortez RN .............. 6/20/2018  2:12 PM

## 2018-06-22 NOTE — TELEPHONE ENCOUNTER
Called [facility] and spoke with Pharmacist, after verifying Patient's last name and , and confirmed receipt of Rx. Maxine Cortez RN .............. 2018  9:10 AM

## 2018-07-23 NOTE — PROGRESS NOTES
Patient Information     Patient Name  Shelton Puentes MRN  [id number] Sex  Male   1970      Letter by Scott Johnston MD at      Author:  Scott Johnston MD Service:  (none) Author Type:  (none)    Filed:   Date of Service:   Status:  (Other)           Shelton Puentes  [address] [place] [id number]          2017    Dear Mr. Puentes:    Following are the tests completed during your last clinic visit.  The results of these tests are included below.      2017 - Exam: XR HAND 3 VIEWS LEFT    History: Arthritis of carpometacarpal (CMC) joints of both thumbs    Technique: 3 views.    Findings: No acute fracture or dislocation is seen. Mild degenerative changes seen in multiple interphalangeal joints. There is moderately severe degenerative change in the first carpal metacarpal joint.       Impression: Degenerative change in multiple interphalangeal joints and the first carpal metacarpal joint.    Electronically Signed By: Cassie Meléndez M.D. on 2017 2:13 PM      2017 - Exam: XR HAND 3 VIEWS RIGHT    History: Arthritis of carpometacarpal (CMC) joints of both thumbs    Technique: 3 views.    Findings: No acute fracture or dislocation is seen. There is no focal bone lesion.    There is mild degenerative change in several interphalangeal joints with moderate degenerative change in the first carpal metacarpal joint.     Electronically Signed By: Cassie Meléndez M.D. on 2017 2:14 PM      If you have any further questions or problems contact my office at  [phone]   --- or send Neoc[employer]isT message --- otherwise schedule an appointment.    Clinic : [phone]  Appointment line: [phone]     Thank you,    Scott Johnston MD    Internal Medicine  [facility]     Reviewed and electronically signed by provider.

## 2018-07-23 NOTE — PROGRESS NOTES
Patient Information     Patient Name  Shelton Puentes MRN  [id number] Sex  Male   1970      Letter by Scott Johnston MD at      Author:  Scott Johnston MD Service:  (none) Author Type:  (none)    Filed:   Encounter Date:  2017 Status:  (Other)           Shelton Puentes  [address] [place] [id number]          May 4, 2017    Dear Mr. Puentes:    Following are the tests completed during your last clinic visit.  The results of these tests are included below.      See--  attached MRI report.     If you have any further questions or problems contact my office at  [phone]   --- or send [employer] message --- otherwise schedule an appointment.    Clinic : [phone]  Appointment line: [phone]     Thank you,    Scott Johnston MD    Internal Medicine  [facility]     Reviewed and electronically signed by provider.

## 2018-07-23 NOTE — PROGRESS NOTES
Patient Information     Patient Name  Shelton Puentes MRN  [id number] Sex  Male   1970      Letter by Scott Johnston MD at      Author:  Scott Johnston MD Service:  (none) Author Type:  (none)    Filed:   Encounter Date:  2017 Status:  (Other)           Shelton Puentes  [address] [place] [address]          2017    Dear Mr. Puentes:    Following are the tests completed during your last clinic visit.  The results of these tests are included below.      Labs look good except for your vitamin D level is low.    Diabetes lab is now controlled.  Continue current medications.    Vitamin D3 is a safe and effective product for the treatment and prevention of osteoporosis, rickets and vitamin D deficiency. Most people living in this part of the northern hemisphere are vitamin D deficient.     The reported Vitamin D lab value can vary by up to +/- 10 points from the reported value --- meaning your Vit D value could be from 10 to 30.     Goal is to have the lab value resulted below be between 40 and 50.     Vitamin D was VERY LOW   -- Start taking 5000 IU vitamin D3 daily with food as it is a fat soluble vitamin.   -- We can recheck this level in about 3 to 4 months    Vitamin D3 is associated with improved immunity, improved bone health, improved mood and in some people a reduction in pain severity.       Results for orders placed or performed in visit on 17      CBC W PLT NO DIFF      Result  Value Ref Range    WHITE BLOOD COUNT         5.9 4.5 - 11.0 thou/cu mm    RED BLOOD COUNT           6.35 (H) 4.30 - 5.90 mil/cu mm    HEMOGLOBIN                17.0 13.5 - 17.5 g/dL    HEMATOCRIT                50.1 37.0 - 53.0 %    MCV                       79 (L) 80 - 100 fL    MCH                       26.8 26.0 - 34.0 pg    MCHC                      33.9 32.0 - 36.0 g/dL    RDW                       13.9 11.5 - 15.5 %    PLATELET COUNT            162 140 - 440 thou/cu mm     MPV                       10.8 6.5 - 11.0 fL   COMP METABOLIC PANEL      Result  Value Ref Range    SODIUM 136 133 - 143 mmol/L    POTASSIUM 3.9 3.5 - 5.1 mmol/L    CHLORIDE 104 98 - 107 mmol/L    CO2,TOTAL 22 21 - 31 mmol/L    ANION GAP 10 5 - 18                    GLUCOSE 107 (H) 70 - 105 mg/dL    CALCIUM 9.1 8.6 - 10.3 mg/dL    BUN 14 7 - 25 mg/dL    CREATININE 0.75 0.70 - 1.30 mg/dL    BUN/CREAT RATIO           19                    GFR if African American >60 >60 ml/min/1.73m2    GFR if not African American >60 >60 ml/min/1.73m2    ALBUMIN 4.0 3.5 - 5.7 g/dL    PROTEIN,TOTAL 7.2 6.4 - 8.9 g/dL    GLOBULIN                  3.2 2.0 - 3.7 g/dL    A/G RATIO 1.3 1.0 - 2.0                    BILIRUBIN,TOTAL 1.0 0.3 - 1.0 mg/dL    ALK PHOSPHATASE 36 34 - 104 IU/L    ALT (SGPT) 28 7 - 52 IU/L    AST (SGOT) 26 13 - 39 IU/L   HEMOGLOBIN A1C MONITORING (POCT)      Result  Value Ref Range    HEMOGLOBIN A1C MONITORING (POCT) 7.6 (H) 4.0 - 6.2 %    ESTIMATED AVERAGE GLUCOSE  171 mg/dL   LIPID PANEL      Result  Value Ref Range    CHOLESTEROL,TOTAL 229 (H) <200 mg/dL    TRIGLYCERIDES 239 (H) <150 mg/dL    HDL CHOLESTEROL 43 23 - 92 mg/dL    NON-HDL CHOLESTEROL 186 (H) <145 mg/dl    CHOL/HDL RATIO            5.33 (H) <4.50                    LDL CHOLESTEROL 138 (H) <100 mg/dL    PROVIDER ORDERED STATUS RANDOM    VITAMIN D 25 (DEFICIENCY)      Result  Value Ref Range    VITAMIN D TOTAL AFL 20.3   ng/mL         If you have any further questions or problems contact my office at  [phone]   --- or send Shelf[employer]T message --- otherwise schedule an appointment.    Clinic : [phone]  Appointment line: [phone]     Thank you,    Scott Johnston MD    Internal Medicine  [facility]     Reviewed and electronically signed by provider.

## 2018-07-24 NOTE — PROGRESS NOTES
Patient Information     Patient Name  Shelton Puentes MRN  [id number] Sex  Male   1970      Letter by Scott Johnston MD at      Author:  Scott Johnston MD Service:  (none) Author Type:  (none)    Filed:   Encounter Date:  2017 Status:  (Other)           Shelton Puentes  [address] [place] [id number]          2017    Dear Mr. Puentes:    Following are the tests completed during your last clinic visit.  The results of these tests are included below.      Your diabetes labs are still quite elevated.    Random blood sugar today was 178.    Start glipizide.  Adjust dose based on your blood sugar response.  You may end up needing to take half of a pill 3 times a day with meals -- or even up to 1 full pill 3 times a day with each meal.    -- labs in 91+ days with Diabetes clinic appointment with Dr. Johnston 1-2 days later.    Cholesterol levels are still very high.  Hopefully this come down with better control of your blood sugars however if they don't --- we should increase your Lipitor.    I was able to talk with Dr. Baugh, he states that the only option for hand, joint arthritis pain is steroid injections.  The rooster comb / Synvisc injections are not an option.    Results for orders placed or performed in visit on 17      CBC W PLT NO DIFF      Result  Value Ref Range    WHITE BLOOD COUNT         6.8 4.5 - 11.0 thou/cu mm    RED BLOOD COUNT           6.28 (H) 4.30 - 5.90 mil/cu mm    HEMOGLOBIN                16.8 13.5 - 17.5 g/dL    HEMATOCRIT                50.5 37.0 - 53.0 %    MCV                       80 80 - 100 fL    MCH                       26.8 26.0 - 34.0 pg    MCHC                      33.3 32.0 - 36.0 g/dL    RDW                       14.2 11.5 - 15.5 %    PLATELET COUNT            152 140 - 440 thou/cu mm    MPV                       10.9 6.5 - 11.0 fL   COMP METABOLIC PANEL      Result  Value Ref Range    SODIUM 139 133 - 143 mmol/L     POTASSIUM 4.3 3.5 - 5.1 mmol/L    CHLORIDE 102 98 - 107 mmol/L    CO2,TOTAL 25 21 - 31 mmol/L    ANION GAP 12 5 - 18                    GLUCOSE 178 (H) 70 - 105 mg/dL    CALCIUM 9.6 8.6 - 10.3 mg/dL    BUN 15 7 - 25 mg/dL    CREATININE 0.86 0.70 - 1.30 mg/dL    BUN/CREAT RATIO           17                    GFR if African American >60 >60 ml/min/1.73m2    GFR if not African American >60 >60 ml/min/1.73m2    ALBUMIN 4.2 3.5 - 5.7 g/dL    PROTEIN,TOTAL 7.1 6.4 - 8.9 g/dL    GLOBULIN                  2.9 2.0 - 3.7 g/dL    A/G RATIO 1.4 1.0 - 2.0                    BILIRUBIN,TOTAL 1.1 (H) 0.3 - 1.0 mg/dL    ALK PHOSPHATASE 34 34 - 104 IU/L    ALT (SGPT) 35 7 - 52 IU/L    AST (SGOT) 23 13 - 39 IU/L   HEMOGLOBIN A1C MONITORING (POCT)      Result  Value Ref Range    HEMOGLOBIN A1C MONITORING (POCT) 8.8 (H) 4.0 - 6.2 %    ESTIMATED AVERAGE GLUCOSE  206 mg/dL   LIPID PANEL      Result  Value Ref Range    CHOLESTEROL,TOTAL 233 (H) <200 mg/dL    TRIGLYCERIDES 194 (H) <150 mg/dL    HDL CHOLESTEROL 49 23 - 92 mg/dL    NON-HDL CHOLESTEROL 184 (H) <145 mg/dl    CHOL/HDL RATIO            4.76 (H) <4.50                    LDL CHOLESTEROL 145 (H) <100 mg/dL    PATIENT STATUS            NON-FASTING                   URINALYSIS W REFLEX MICROSCOPIC IF POSITIVE      Result  Value Ref Range    COLOR                     Yellow Yellow Color    CLARITY                   Clear Clear Clarity    SPECIFIC GRAVITY,URINE    1.025 1.010, 1.015, 1.020, 1.025                    PH,URINE                  5.5 6.0, 7.0, 8.0, 5.5, 6.5, 7.5, 8.5                    UROBILINOGEN,QUALITATIVE  Normal Normal EU/dl    PROTEIN, URINE Negative Negative mg/dL    GLUCOSE, URINE 100 (A) Negative mg/dL    KETONES,URINE             Negative Negative mg/dL    BILIRUBIN,URINE           Negative Negative                    OCCULT BLOOD,URINE        Negative Negative                    NITRITE                   Negative Negative                    LEUKOCYTE ESTERASE         Negative Negative                   MICROALBUMIN RANDOM URINE      Result  Value Ref Range    ALB RAND URINE            9.3 mg/L    CREATININE,URINE          1.93 g/L    MICROALBUMIN,RAND UR      4.8 <30.0 mg/g creat   URINALYSIS MICROSCOPIC      Result  Value Ref Range    RBC None Seen 0-2, None Seen /HPF    WBC None Seen 0-2, 3-5, None Seen /HPF    BACTERIA                  None Seen None Seen, Rare, Occasional, Few Bacteria/HPF    EPITHELIAL CELLS          None Seen None Seen, Few Epi/HPF         If you have any further questions or problems contact my office at  [phone]   --- or send [employer] message --- otherwise schedule an appointment.    Clinic : [phone]  Appointment line: [phone]     Thank you,    Scott Johnston MD    Internal Medicine  [facility]     Reviewed and electronically signed by provider.

## 2018-07-24 NOTE — PROGRESS NOTES
Patient Information     Patient Name  Shelton Puentes MRN  [id number] Sex  Male   1970      Letter by Scott Johnston MD at      Author:  Scott Johnston MD Service:  (none) Author Type:  (none)    Filed:   Encounter Date:  2017 Status:  (Other)           Shelton Puentes  [address]  [facility] [address]          2017    Dear Mr. Puentes:    Following are the tests completed during your last clinic visit.  The results of these tests are included below.      Your hemoglobin A1c is extremely high.  The tanzeum will definitely help but will likely not be adequate by itself.    Schedule follow-up appointment with the diabetic education department, to help modify your medications.    We need to get your blood sugars down.    Results for orders placed or performed in visit on 17      CBC W PLT NO DIFF      Result  Value Ref Range    WHITE BLOOD COUNT         4.6 4.5 - 11.0 thou/cu mm    RED BLOOD COUNT           6.16 (H) 4.30 - 5.90 mil/cu mm    HEMOGLOBIN                16.5 13.5 - 17.5 g/dL    HEMATOCRIT                50.1 37.0 - 53.0 %    MCV                       81 80 - 100 fL    MCH                       26.7 26.0 - 34.0 pg    MCHC                      32.9 32.0 - 36.0 g/dL    RDW                       12.9 11.5 - 15.5 %    PLATELET COUNT            141 140 - 440 thou/cu mm    MPV                       9.3 6.5 - 11.0 fL   COMP METABOLIC PANEL      Result  Value Ref Range    SODIUM 136 133 - 143 mmol/L    POTASSIUM 4.0 3.5 - 5.1 mmol/L    CHLORIDE 105 98 - 107 mmol/L    CO2,TOTAL 22 21 - 31 mmol/L    ANION GAP 9 5 - 18                    GLUCOSE 303 (H) 70 - 105 mg/dL    CALCIUM 9.3 8.6 - 10.3 mg/dL    BUN 12 7 - 25 mg/dL    CREATININE 0.90 0.70 - 1.30 mg/dL    BUN/CREAT RATIO           13                    GFR if African American >60 >60 ml/min/1.73m2    GFR if not African American >60 >60 ml/min/1.73m2    ALBUMIN 4.0 3.5 - 5.7 g/dL    PROTEIN,TOTAL 6.1 (L) 6.4  - 8.9 g/dL    GLOBULIN                  2.1 2.0 - 3.7 g/dL    A/G RATIO 1.9 1.0 - 2.0                    BILIRUBIN,TOTAL 1.0 0.3 - 1.0 mg/dL    ALK PHOSPHATASE 41 34 - 104 IU/L    ALT (SGPT) 49 7 - 52 IU/L    AST (SGOT) 37 13 - 39 IU/L   HEMOGLOBIN A1C MONITORING (POCT)      Result  Value Ref Range    HEMOGLOBIN A1C MONITORING (POCT) 10.2 (H) 4.0 - 6.2 %    ESTIMATED AVERAGE GLUCOSE  246 mg/dL   LIPID PANEL      Result  Value Ref Range    CHOLESTEROL,TOTAL 235 (H) <200 mg/dL    TRIGLYCERIDES 349 (H) <150 mg/dL    HDL CHOLESTEROL 37 23 - 92 mg/dL    NON-HDL CHOLESTEROL 198 (H) <145 mg/dl    CHOL/HDL RATIO            6.35 (H) <4.50                    LDL CHOLESTEROL 128 (H) <100 mg/dL    PATIENT STATUS            NOT GIVEN                         If you have any further questions or problems contact my office at  [phone]   --- or send [employer] message --- otherwise schedule an appointment.    Clinic : [phone]  Appointment line: [phone]     Thank you,    Scott Johnston MD    Internal Medicine  [facility]     Reviewed and electronically signed by provider.

## 2018-08-09 DIAGNOSIS — E11.9 CONTROLLED TYPE 2 DIABETES MELLITUS WITHOUT COMPLICATION, WITHOUT LONG-TERM CURRENT USE OF INSULIN (H): Primary | ICD-10-CM

## 2018-08-09 DIAGNOSIS — G89.29 OTHER CHRONIC PAIN: ICD-10-CM

## 2018-08-12 DIAGNOSIS — E11.9 CONTROLLED TYPE 2 DIABETES MELLITUS WITHOUT COMPLICATION, WITHOUT LONG-TERM CURRENT USE OF INSULIN (H): Primary | ICD-10-CM

## 2018-08-14 RX ORDER — METHOCARBAMOL 750 MG/1
TABLET, FILM COATED ORAL
Qty: 90 TABLET | Refills: 3 | Status: SHIPPED | OUTPATIENT
Start: 2018-08-14 | End: 2019-01-06

## 2018-08-14 RX ORDER — LANCETS
EACH MISCELLANEOUS
Qty: 300 EACH | Refills: 0 | Status: SHIPPED | OUTPATIENT
Start: 2018-08-14 | End: 2018-12-02

## 2018-08-14 NOTE — TELEPHONE ENCOUNTER
Routing refill request to provider for review/approval because:  Drug not on the [facility] refill protocol   Medication is reported/historical    LOV: 5/15/18    Ananya Mckinley RN on 8/14/2018 at 8:04 AM

## 2018-08-14 NOTE — TELEPHONE ENCOUNTER
Prescription approved per [facility] Refill Protocol.  LOV: 5/15/18  Patient due for diabetic follow up  And labs.  Letter sent  Ananya Mckinley RN on 8/14/2018 at 8:02 AM

## 2018-08-15 RX ORDER — BLOOD SUGAR DIAGNOSTIC
STRIP MISCELLANEOUS
Qty: 300 STRIP | Refills: 0 | Status: SHIPPED | OUTPATIENT
Start: 2018-08-15 | End: 2018-11-19

## 2018-08-15 NOTE — TELEPHONE ENCOUNTER
Prescription approved per [facility] Refill Protocol.  LOV: 5/15/18    Patient due for diabetic follow up  And labs.  Letter sent already    Ananya Mckinley RN on 8/15/2018 at 12:27 PM

## 2018-10-08 DIAGNOSIS — M54.42 CHRONIC MIDLINE LOW BACK PAIN WITH LEFT-SIDED SCIATICA: Primary | ICD-10-CM

## 2018-10-08 DIAGNOSIS — G89.29 CHRONIC MIDLINE LOW BACK PAIN WITH LEFT-SIDED SCIATICA: Primary | ICD-10-CM

## 2018-10-11 RX ORDER — GABAPENTIN 100 MG/1
CAPSULE ORAL
Qty: 1080 CAPSULE | Refills: 11 | Status: SHIPPED | OUTPATIENT
Start: 2018-10-11 | End: 2019-01-15

## 2018-10-19 ENCOUNTER — MYC MEDICAL ADVICE (OUTPATIENT)
Dept: INTERNAL MEDICINE | Facility: OTHER | Age: 48
End: 2018-10-19

## 2018-10-19 DIAGNOSIS — F41.9 ANXIETY AND DEPRESSION: Primary | ICD-10-CM

## 2018-10-19 DIAGNOSIS — F32.A ANXIETY AND DEPRESSION: Primary | ICD-10-CM

## 2018-10-23 RX ORDER — CITALOPRAM HYDROBROMIDE 20 MG/1
20 TABLET ORAL DAILY
Qty: 90 TABLET | Refills: 3 | Status: SHIPPED | OUTPATIENT
Start: 2018-10-23 | End: 2019-03-22

## 2018-10-23 NOTE — TELEPHONE ENCOUNTER
Patient notified and stated they found it was Dr. English who started the [facility]   Cheryl Barbour LPN........................10/23/2018  2:55 PM

## 2018-10-23 NOTE — TELEPHONE ENCOUNTER
Noted.  Prescription for citalopram sent to pharmacy, 90 tablets with 3 refills.    Uncertain who his sleep study provider and original prescription provider was for his CPAP supplies.  Have patient look into a different CPAP vendor.? [facility] in [place] or other place in [place].     Scott Johnston MD   Alert

## 2018-11-19 DIAGNOSIS — E11.9 CONTROLLED TYPE 2 DIABETES MELLITUS WITHOUT COMPLICATION, WITHOUT LONG-TERM CURRENT USE OF INSULIN (H): ICD-10-CM

## 2018-11-21 NOTE — TELEPHONE ENCOUNTER
RN refill protocol fails as patient has not been seen by a provider in the last 6 months. However, patient does have an appointment scheduled with PCP for 12/5/18. Writer will refill Rx as requested for a limited supply at this time to get patient through his appointment date as noted.    Prescription refilled per RN Medication Refill Policy..................Paulo Montiel 11/21/2018 1:58 PM

## 2018-11-29 ENCOUNTER — TELEPHONE (OUTPATIENT)
Dept: INTERNAL MEDICINE | Facility: OTHER | Age: 48
End: 2018-11-29

## 2018-11-29 NOTE — TELEPHONE ENCOUNTER
Called this writer to offer a later appointment time due to possible road conditions.  Patient accepted appointment time of 11:40, 12-5-18.      Reanna Cedeno LPN 11/29/2018 11:19 AM

## 2018-12-02 DIAGNOSIS — E11.9 CONTROLLED TYPE 2 DIABETES MELLITUS WITHOUT COMPLICATION, WITHOUT LONG-TERM CURRENT USE OF INSULIN (H): ICD-10-CM

## 2018-12-04 RX ORDER — LANCETS
EACH MISCELLANEOUS
Qty: 400 EACH | Refills: 0 | Status: SHIPPED | OUTPATIENT
Start: 2018-12-04 | End: 2019-04-09

## 2018-12-04 NOTE — TELEPHONE ENCOUNTER
Prescription approved per [facility] Refill Protocol.  LOV; 5/15/18  Ananya Mckinley RN on 12/4/2018 at 11:26 AM

## 2018-12-05 ENCOUNTER — HOSPITAL ENCOUNTER (OUTPATIENT)
Dept: GENERAL RADIOLOGY | Facility: OTHER | Age: 48
Discharge: HOME | End: 2018-12-05
Attending: INTERNAL MEDICINE | Admitting: INTERNAL MEDICINE
Payer: COMMERCIAL

## 2018-12-05 ENCOUNTER — OFFICE VISIT (OUTPATIENT)
Dept: INTERNAL MEDICINE | Facility: OTHER | Age: 48
End: 2018-12-05
Attending: INTERNAL MEDICINE
Payer: COMMERCIAL

## 2018-12-05 ENCOUNTER — HOSPITAL ENCOUNTER (OUTPATIENT)
Dept: GENERAL RADIOLOGY | Facility: OTHER | Age: 48
End: 2018-12-05
Attending: INTERNAL MEDICINE
Payer: COMMERCIAL

## 2018-12-05 VITALS
BODY MASS INDEX: 7248.04 KG/M2 | HEART RATE: 80 BPM | WEIGHT: 315 LBS | DIASTOLIC BLOOD PRESSURE: 74 MMHG | SYSTOLIC BLOOD PRESSURE: 138 MMHG

## 2018-12-05 DIAGNOSIS — L57.8 SOLAR AGING OF SKIN: ICD-10-CM

## 2018-12-05 DIAGNOSIS — M15.0 PRIMARY OSTEOARTHRITIS INVOLVING MULTIPLE JOINTS: ICD-10-CM

## 2018-12-05 DIAGNOSIS — M54.2 CHRONIC NECK AND BACK PAIN: ICD-10-CM

## 2018-12-05 DIAGNOSIS — M54.9 CHRONIC NECK AND BACK PAIN: ICD-10-CM

## 2018-12-05 DIAGNOSIS — G89.29 CHRONIC NECK AND BACK PAIN: ICD-10-CM

## 2018-12-05 DIAGNOSIS — E78.2 MIXED HYPERLIPIDEMIA: ICD-10-CM

## 2018-12-05 DIAGNOSIS — E66.01 MORBID OBESITY WITH BMI OF 45.0-49.9, ADULT (H): ICD-10-CM

## 2018-12-05 DIAGNOSIS — L91.8 SKIN TAG: ICD-10-CM

## 2018-12-05 DIAGNOSIS — F41.9 ANXIETY AND DEPRESSION: ICD-10-CM

## 2018-12-05 DIAGNOSIS — D22.9 MELANOCYTIC NEVUS OF SKIN: ICD-10-CM

## 2018-12-05 DIAGNOSIS — F32.A ANXIETY AND DEPRESSION: ICD-10-CM

## 2018-12-05 DIAGNOSIS — G47.33 OSA ON CPAP: ICD-10-CM

## 2018-12-05 LAB
ALBUMIN SERPL-MCNC: 4.2 G/DL (ref 3.5–5.7)
ALBUMIN UR-MCNC: NEGATIVE MG/DL
ALP SERPL-CCNC: 40 U/L (ref 34–104)
ALT SERPL W P-5'-P-CCNC: 39 U/L (ref 7–52)
ANION GAP SERPL CALCULATED.3IONS-SCNC: 9 MMOL/L (ref 3–14)
APPEARANCE UR: CLEAR
AST SERPL W P-5'-P-CCNC: 27 U/L (ref 13–39)
BILIRUB SERPL-MCNC: 1.2 MG/DL (ref 0.3–1)
BILIRUB UR QL STRIP: NEGATIVE
BUN SERPL-MCNC: 15 MG/DL (ref 7–25)
CALCIUM SERPL-MCNC: 9.4 MG/DL (ref 8.6–10.3)
CHLORIDE SERPL-SCNC: 100 MMOL/L (ref 98–107)
CHOLEST SERPL-MCNC: 243 MG/DL
CO2 SERPL-SCNC: 26 MMOL/L (ref 21–31)
COLOR UR AUTO: YELLOW
CREAT SERPL-MCNC: 0.86 MG/DL (ref 0.7–1.3)
CREAT UR-MCNC: 58 MG/DL
ERYTHROCYTE [DISTWIDTH] IN BLOOD BY AUTOMATED COUNT: 13.2 % (ref 10–15)
GFR SERPL CREATININE-BSD FRML MDRD: ABNORMAL ML/MIN/1.7M2
GLUCOSE SERPL-MCNC: 321 MG/DL (ref 70–105)
GLUCOSE UR STRIP-MCNC: >1000 MG/DL
HBA1C MFR BLD: 9.1 % (ref 4–6)
HCT VFR BLD AUTO: 49.6 % (ref 40–53)
HDLC SERPL-MCNC: 37 MG/DL (ref 23–92)
HGB BLD-MCNC: 16.5 G/DL (ref 13.3–17.7)
HGB UR QL STRIP: NEGATIVE
KETONES UR STRIP-MCNC: NEGATIVE MG/DL
LDLC SERPL CALC-MCNC: 143 MG/DL
LEUKOCYTE ESTERASE UR QL STRIP: NEGATIVE
MCH RBC QN AUTO: 26.4 PG (ref 26.5–33)
MCHC RBC AUTO-ENTMCNC: 33.3 G/DL (ref 31.5–36.5)
MCV RBC AUTO: 80 FL (ref 78–100)
MICROALBUMIN UR-MCNC: <5 MG/L
MICROALBUMIN/CREAT UR: NORMAL MG/G CR (ref 0–17)
NITRATE UR QL: NEGATIVE
NONHDLC SERPL-MCNC: 206 MG/DL
PH UR STRIP: 5 PH (ref 5–9)
PLATELET # BLD AUTO: 152 10E9/L (ref 150–450)
POTASSIUM SERPL-SCNC: 4 MMOL/L (ref 3.5–5.1)
PROT SERPL-MCNC: 7.5 G/DL (ref 6.4–8.9)
RBC # BLD AUTO: 6.24 10E12/L (ref 4.4–5.9)
RBC #/AREA URNS AUTO: NORMAL /HPF
SODIUM SERPL-SCNC: 135 MMOL/L (ref 134–144)
SOURCE: ABNORMAL
SP GR UR STRIP: 1.02 (ref 1–1.03)
TRIGL SERPL-MCNC: 315 MG/DL
UROBILINOGEN UR STRIP-ACNC: 0.2 EU/DL (ref 0.2–1)
WBC # BLD AUTO: 5.5 10E9/L (ref 4–11)
WBC #/AREA URNS AUTO: NORMAL /HPF

## 2018-12-05 PROCEDURE — 72074 X-RAY EXAM THORAC SPINE4/>VW: CPT

## 2018-12-05 PROCEDURE — 81001 URINALYSIS AUTO W/SCOPE: CPT | Performed by: INTERNAL MEDICINE

## 2018-12-05 PROCEDURE — 72050 X-RAY EXAM NECK SPINE 4/5VWS: CPT

## 2018-12-05 PROCEDURE — 83036 HEMOGLOBIN GLYCOSYLATED A1C: CPT | Performed by: INTERNAL MEDICINE

## 2018-12-05 PROCEDURE — 80053 COMPREHEN METABOLIC PANEL: CPT | Performed by: INTERNAL MEDICINE

## 2018-12-05 PROCEDURE — 82043 UR ALBUMIN QUANTITATIVE: CPT | Performed by: INTERNAL MEDICINE

## 2018-12-05 PROCEDURE — 36415 COLL VENOUS BLD VENIPUNCTURE: CPT | Performed by: INTERNAL MEDICINE

## 2018-12-05 PROCEDURE — 85027 COMPLETE CBC AUTOMATED: CPT | Performed by: INTERNAL MEDICINE

## 2018-12-05 PROCEDURE — 80061 LIPID PANEL: CPT | Performed by: INTERNAL MEDICINE

## 2018-12-05 PROCEDURE — 99214 OFFICE O/P EST MOD 30 MIN: CPT | Performed by: INTERNAL MEDICINE

## 2018-12-05 PROCEDURE — G0463 HOSPITAL OUTPT CLINIC VISIT: HCPCS

## 2018-12-05 ASSESSMENT — ENCOUNTER SYMPTOMS
DYSURIA: 0
DIARRHEA: 0
ARTHRALGIAS: 1
LIGHT-HEADEDNESS: 0
FEVER: 0
NAUSEA: 0
FATIGUE: 0
SHORTNESS OF BREATH: 0
CONFUSION: 0
COUGH: 0
EYE PAIN: 0
WHEEZING: 0
VOMITING: 0
AGITATION: 0
BRUISES/BLEEDS EASILY: 0
HEMATURIA: 0
ABDOMINAL PAIN: 0
CHILLS: 0
MYALGIAS: 0
DIZZINESS: 0
PALPITATIONS: 0

## 2018-12-05 ASSESSMENT — PATIENT HEALTH QUESTIONNAIRE - PHQ9
SUM OF ALL RESPONSES TO PHQ QUESTIONS 1-9: 0
5. POOR APPETITE OR OVEREATING: NOT AT ALL

## 2018-12-05 ASSESSMENT — ANXIETY QUESTIONNAIRES
6. BECOMING EASILY ANNOYED OR IRRITABLE: NOT AT ALL
GAD7 TOTAL SCORE: 0
IF YOU CHECKED OFF ANY PROBLEMS ON THIS QUESTIONNAIRE, HOW DIFFICULT HAVE THESE PROBLEMS MADE IT FOR YOU TO DO YOUR WORK, TAKE CARE OF THINGS AT HOME, OR GET ALONG WITH OTHER PEOPLE: NOT DIFFICULT AT ALL
2. NOT BEING ABLE TO STOP OR CONTROL WORRYING: NOT AT ALL
3. WORRYING TOO MUCH ABOUT DIFFERENT THINGS: NOT AT ALL
1. FEELING NERVOUS, ANXIOUS, OR ON EDGE: NOT AT ALL
5. BEING SO RESTLESS THAT IT IS HARD TO SIT STILL: NOT AT ALL
7. FEELING AFRAID AS IF SOMETHING AWFUL MIGHT HAPPEN: NOT AT ALL

## 2018-12-05 ASSESSMENT — PAIN SCALES - GENERAL: PAINLEVEL: SEVERE PAIN (6)

## 2018-12-05 NOTE — NURSING NOTE
"Patient presents to the clinic for multiple concerns.      Previous A1C is at goal of <8  Lab Results   Component Value Date    A1C 7.8 05/15/2018     Urine microalbumin:creatine: n/a  Foot exam unknown-declines today  Eye exam 04/2018    Tobacco User no  Patient is on a daily aspirin  Patient is not on a Statin.  Blood pressure today of:     BP Readings from Last 1 Encounters:   05/15/18 134/70      is at the goal of <139/89 for diabetics.    Chief Complaint   Patient presents with     Diabetes       Initial There were no vitals taken for this visit. Estimated body mass index is 7,233.39 kg/(m^2) as calculated from the following:    Height as of 2/21/18: 6\" (0.152 m).    Weight as of 5/15/18: 370 lb 6 oz (168 kg).  Medication Reconciliation: complete    Reanna Cedeno LPN        "

## 2018-12-05 NOTE — MR AVS SNAPSHOT
After Visit Summary   12/5/2018    Shelton Puentes    MRN: [id number]           Patient Information     Date Of Birth          1970        Visit Information        Provider Department      12/5/2018 11:40 AM Scott Johnston MD [facility]        Today's Diagnoses     Controlled type 2 diabetes mellitus without complication, without long-term current use of insulin (H)    -  1    Morbid obesity with BMI of 45.0-49.9, adult (H)        GWEN on CPAP - uses nightly, finds helpful. currently 15 cm H20 - will increase to 18 cm H20 5/15/2018        Mixed hyperlipidemia        Primary osteoarthritis involving multiple joints        Anxiety and depression        Solar aging of skin        Skin tag        Melanocytic nevus of skin        Chronic neck and back pain          Care Instructions    Flu shot today.     Medications refilled.     Xray and Labs today.     Dermatology referral sent  - they will call with date/time of appointment.       Return for Diabetes labs and clinic follow-up appointment every 3 to 4 months.  --- (Go for about 91 to 100 days)    Aspects of Diabetes we can improve:  Hemoglobin A1c Lab Results   Component Value Date    A1C 7.8 05/15/2018    Goal range is under 8. Best is 6.5 to 7   Blood Pressure 138/74 Goal to keep less than 140/90   Tobacco  reports that he has never smoked. He has never used smokeless tobacco. Goal to abstain from tobacco   Aspirin or Plavix Aspirin Aspirin or Plavix reduces risk of heart disease and stroke   ACE/ARB -- declined These medications reduce risk of kidney disease   Cholesterol -- declined Statins reduce risk of heart disease and stroke   Eye Exam -- Do Yearly -- Annual diabetic eye exam   Healthy weight Body mass index is 7,248.04 kg/(m^2). Goal BMI under 30, best is under 25.      -- Trying to exercise daily (goal at least 20 min/day) with moderate aerobic activity   -- Eat healthy (resources from ADA at  http://www.diabetes.org/)   -- Taking good care of my feet. Consider seeing the Podiatrist   -- Check blood sugars as directed, record in log book and bring to every appointment      Schedule lab only appointment --- A few days AFTER: 3/5/19   Schedule clinic appointment with Dr. Johnston -- Same day as labs, or 1-2 days later.     Insurance companies are now grading you and I on your blood sugar control -- Goal is to get your A1c down to 7.9% or lower and NO Smoking!    -- Medicare and most insurance companies, will only cover Hemoglobin A1c labs to be rechecked every 91+ days.      Hemoglobin A1C   Date Value Ref Range Status   05/15/2018 7.8 (H) 4.0 - 6.0 % Final       Next follow-up appointment with Dr. Johnston should be scheduled:  -- Approximately a few days AFTER: 3/5/19             Follow-ups after your visit        Additional Services     DERMATOLOGY REFERRAL       Your provider has referred you to: [facility] or [facility]    Please be aware that coverage of these services is subject to the terms and limitations of your health insurance plan.  Call member services at your health plan with any benefit or coverage questions.      Please bring the following with you to your appointment:    (1) Any X-Rays, CTs or MRIs which have been performed.  Contact the facility where they were done to arrange for  prior to your scheduled appointment.    (2) List of current medications  (3) This referral request   (4) Any documents/labs given to you for this referral                  Future tests that were ordered for you today     Open Future Orders        Priority Expected Expires Ordered    XR Cervical Spine Flex/Ext 2/3 Views Routine 12/5/2018 12/5/2019 12/5/2018    XR Thoracic Spine G/E 4 Views Routine 12/5/2018 12/5/2019 12/5/2018            Who to contact     If you have questions or need follow up information about today's clinic visit or your schedule please contact [facility]  directly at [phone].  Normal or non-critical lab and imaging results will be communicated to you by MyChart, letter or phone within 4 business days after the clinic has received the results. If you do not hear from us within 7 days, please contact the clinic through [employer] or phone. If you have a critical or abnormal lab result, we will notify you by phone as soon as possible.  Submit refill requests through [employer] or call your pharmacy and they will forward the refill request to us. Please allow 3 business days for your refill to be completed.          Additional Information About Your Visit        [employer] Information     [employer] gives you secure access to your electronic health record. If you see a primary care provider, you can also send messages to your care team and make appointments. If you have questions, please call your primary care clinic.  If you do not have a primary care provider, please call [phone] and they will assist you.        Care EveryWhere ID     This is your Care EveryWhere ID. This could be used by other organizations to access your [place] medical records  [id number]        Your Vitals Were     Pulse BMI (Body Mass Index)                80 7,248.04 kg/m2           Blood Pressure from Last 3 Encounters:   12/05/18 138/74   05/15/18 134/70   02/21/18 130/88    Weight from Last 3 Encounters:   12/05/18 (!) 371 lb 2 oz (168.3 kg)   05/15/18 (!) 370 lb 6 oz (168 kg)   02/21/18 (!) 375 lb (170.1 kg)              We Performed the Following     DERMATOLOGY REFERRAL        Primary Care Provider Office Phone # Fax #    Scott Johnston [phone]       [address]  [place] [address]        Equal Access to Services     [facility]: Hadii nan Edwards, radha barger, qakevin kaalmasonido brown. So [facility] [phone].    ATENCIÓN: Si habla español, tiene a rueda disposición servicios gratuitos de asistencia  lingüísticaDavid Jc al [phone].    We comply with applicable federal civil rights laws and [place] laws. We do not discriminate on the basis of race, color, national origin, age, disability, sex, sexual orientation, or gender identity.            Thank you!     Thank you for choosing [facility]  for your care. Our goal is always to provide you with excellent care. Hearing back from our patients is one way we can continue to improve our services. Please take a few minutes to complete the written survey that you may receive in the mail after your visit with us. Thank you!             Your Updated Medication List - Protect others around you: Learn how to safely use, store and throw away your medicines at www.disposemymeds.org.          This list is accurate as of 12/5/18 12:15 PM.  Always use your most recent med list.                   Brand Name Dispense Instructions for use Diagnosis    aspirin 81 MG tablet    ASA    90 tablet    Take 1 tablet (81 mg) by mouth daily -- ONCE weekly -- at most    Controlled type 2 diabetes mellitus without complication, without long-term current use of insulin (H)       blood glucose lancets standard    NO BRAND SPECIFIED     Dispense item covered by pt ins. E11.65 NIDDM type II, uncontrolled - Test 3 times/day, Reason: High A1C, new start Glipizide        blood glucose monitoring lancets     400 each    USE TO TEST STRIPS THREE TIMES DAILY.    Controlled type 2 diabetes mellitus without complication, without long-term current use of insulin (H)       blood glucose monitoring test strip    ACCU-CHEK SMARTVIEW    300 strip    Test blood sugar three times daily. Dx: E11.9    Controlled type 2 diabetes mellitus without complication, without long-term current use of insulin (H)       * celecoxib 200 MG capsule    celeBREX    90 capsule    Take 1 capsule (200 mg) by mouth daily -- With food as needed for arthritis pain    Arthritis of carpometacarpal (CMC)  joints of both thumbs       * celecoxib 200 MG capsule    celeBREX    180 capsule    Take 1 capsule (200 mg) by mouth 2 times daily as needed for moderate pain    Arthritis of carpometacarpal (CMC) joints of both thumbs, Primary osteoarthritis involving multiple joints       citalopram 20 MG tablet    celeXA    90 tablet    Take 1 tablet (20 mg) by mouth daily    Anxiety and depression       FIFTY50 GLUCOSE METER 2.0 w/Device Kit      Dispense item covered by pt ins. E11.65 NIDDM type II, uncontrolled - Test 3 time/day        gabapentin 100 MG capsule    NEURONTIN    1080 capsule    TAKE 1-3 CAPSULES BY MOUTH FOUR TIMES DAILY AS NEEDED FOR BURNING OR SHOOTING NERVE PAIN    Chronic midline low back pain with left-sided sciatica       glipiZIDE 5 MG tablet    GLUCOTROL    540 tablet    Take 1-2 tablets (5-10 mg) by mouth 3 times daily    Type 2 diabetes mellitus without complication, unspecified long term insulin use status       MAPAP 500 MG tablet   Generic drug:  acetaminophen     240 tablet    TAKE 2 TABLETS BY MOUTH EVERY 6 HOURS AS NEEDED. NO MORE THAN 4,000MG OF ACETAMINOPHEN DAILY    Other chronic pain       methocarbamol 750 MG tablet    ROBAXIN    90 tablet    TAKE 1 TABLET BY MOUTH THREE TIMES DAILY AS NEEDED FOR PAIN    Other chronic pain       Vitamin D-3 5000 units Tabs     100 tablet    Take 5,000 Units by mouth daily    Vitamin D deficiency       * Notice:  This list has 2 medication(s) that are the same as other medications prescribed for you. Read the directions carefully, and ask your doctor or other care provider to review them with you.

## 2018-12-05 NOTE — PROGRESS NOTES
"Nursing Notes:   Reanna Cedeno LPN  12/5/2018 12:03 PM  Signed  Patient presents to the clinic for multiple concerns.      Previous A1C is at goal of <8  Lab Results   Component Value Date    A1C 7.8 05/15/2018     Urine microalbumin:creatine: n/a  Foot exam unknown-declines today  Eye exam 04/2018    Tobacco User no  Patient is on a daily aspirin  Patient is not on a Statin.  Blood pressure today of:     BP Readings from Last 1 Encounters:   05/15/18 134/70      is at the goal of <139/89 for diabetics.    Chief Complaint   Patient presents with     Diabetes       Initial There were no vitals taken for this visit. Estimated body mass index is 7,233.39 kg/(m^2) as calculated from the following:    Height as of 2/21/18: 6\" (0.152 m).    Weight as of 5/15/18: 370 lb 6 oz (168 kg).  Medication Reconciliation: complete    Reanna Cedeno LPN        Nursing note reviewed with patient.  Accurracy and completeness verified.   Mr. Puentes is a 48 year old male who:  Patient presents with:  Diabetes      ICD-10-CM    1. Uncontrolled type 2 diabetes mellitus without complication, without long-term current use of insulin (H) E11.65 Comprehensive metabolic panel     CBC with platelets     Hemoglobin A1c     Albumin Random Urine Quantitative with Creat Ratio     *UA reflex to Microscopic     Urine Microscopic   2. Morbid obesity with BMI of 45.0-49.9, adult (H) E66.01     Z68.42    3. GWEN on CPAP - uses nightly, finds helpful. currently 15 cm H20 - will increase to 18 cm H20 5/15/2018 G47.33     Z99.89    4. Mixed hyperlipidemia E78.2 Lipid Profile   5. Primary osteoarthritis involving multiple joints M15.0    6. Anxiety and depression F41.9     F32.9    7. Solar aging of skin L57.8 DERMATOLOGY REFERRAL   8. Skin tag L91.8 DERMATOLOGY REFERRAL   9. Melanocytic nevus of skin D22.9 DERMATOLOGY REFERRAL   10. Chronic neck and back pain M54.2 XR Thoracic Spine G/E 4 Views    M54.9 CANCELED: XR Cervical Spine Flex/Ext 2/3 Views "    G89.29      HPI  Patient comes in for follow-up of multiple issues.     Type 2 diabetes mellitus.  Taking glipizide.  Needs refills of medications, supplies.  Check labs today.  No hypoglycemia issues.  Has not really been watching his diet that closely.  -- Hemoglobin A1c was previously well controlled, now severely uncontrolled.  We will need close clinic follow-up to discussed medication changes and dietary improvement.  Recent Labs   Lab Test  12/05/18   1225  05/15/18   1121  12/12/17   1718   A1C  9.1*  7.8*   --    LDL  143*  132*  138*   HDL  37  39  43   TRIG  315*  160*  239*   ALT  39  39   --    CR  0.86  0.72   --    GFRESTIMATED  Not Calculated  >90   --    GFRESTBLACK  Not Calculated  >90   --    POTASSIUM  4.0  4.0   --          Obesity, discussed need for reduced oral caloric intake.  Regular exercise.  Reduce carbohydrate intake.  Information printed and reviewed.  Reports he has been trying to cut down on his dose of Celexa which has helped his appetite.  He actually skipped the pill for 3 days and his appetite dropped substantially.  He is worried about the continued weight increase.  He is going to continue to adjust the dose based on his mood and symptoms.    Sleep apnea, uses CPAP nightly.  Advised to continue.    Mixed hyperlipidemia, check lipid panel.  Currently  trying to manage with diet only.  He refuses statin therapy.    Multiple joint arthritis, osteoarthritis, has had thumb surgery on both hands and reports substantial reduction in pain.    Anxiety and depression, chronic.  We discussed discontinuation of Celexa, states that he does have some depression issues and so he wants to continue it but he will try to use the smallest dose possible to help with his anxiety.    Skin issues, has some melanocytic lesions on his back, large skin tag in his left axilla.  Multiple smaller skin tags.  Some solar damage as well.  He spent many years outdoors.  He would like to see dermatology.   Referral sent.    Chronic back and neck pain.  States that many years ago he injured his neck and has been having some pain chronically since that time.  He would like to get some x-rays of his spine and neck.  Orders placed.    Functional Capacity: about 4 METS.   Review of Systems   Constitutional: Negative for chills, fatigue and fever.   HENT: Negative for congestion and hearing loss.    Eyes: Negative for pain and visual disturbance.   Respiratory: Negative for cough, shortness of breath and wheezing.    Cardiovascular: Negative for chest pain and palpitations.   Gastrointestinal: Negative for abdominal pain, diarrhea, nausea and vomiting.   Endocrine: Negative for cold intolerance and heat intolerance.   Genitourinary: Negative for dysuria and hematuria.   Musculoskeletal: Positive for arthralgias. Negative for myalgias.   Skin: Negative for pallor.        Skin tags, melanocytic skin lesion on back, large tag under left arm   Allergic/Immunologic: Negative for immunocompromised state.   Neurological: Negative for dizziness and light-headedness.   Hematological: Does not bruise/bleed easily.   Psychiatric/Behavioral: Negative for agitation and confusion.      ASHVIN:   ASHVIN-7 SCORE 10/10/2017 5/15/2018 12/5/2018   Total Score 0 0 0     PHQ9:  PHQ-9 SCORE 12/12/2017 5/15/2018 12/5/2018   PHQ-9 Total Score 0 0 0       I have personally reviewed the past medical history, past surgical history, medications, allergies, family and social history as listed below, on 12/5/2018.    Allergies   Allergen Reactions     Ace Inhibitors Other (See Comments)     - Declined     Hmg-Coa-R Inhibitors Other (See Comments)     -- declines at this time, re-address at next clinic appointment --     Metformin Other (See Comments)     Felt very foggy / groggy after taking, when in combination with Robaxin.        Current Outpatient Prescriptions   Medication Sig Dispense Refill     aspirin 81 MG tablet Take 1 tablet (81 mg) by mouth daily  -- ONCE weekly -- at most 90 tablet 3     blood glucose (NO BRAND SPECIFIED) lancets standard Dispense item covered by pt ins. E11.65 NIDDM type II, uncontrolled - Test 3 times/day, Reason: High A1C, new start Glipizide       blood glucose monitoring (ACCU-CHEK FASTCLIX) lancets USE TO TEST STRIPS THREE TIMES DAILY. 400 each 0     blood glucose monitoring (ACCU-CHEK SMARTVIEW) test strip Test blood sugar three times daily. Dx: E11.9 300 strip 0     Blood Glucose Monitoring Suppl (FIFTY50 GLUCOSE METER 2.0) W/DEVICE KIT Dispense item covered by pt ins. E11.65 NIDDM type II, uncontrolled - Test 3 time/day       celecoxib (CELEBREX) 200 MG capsule Take 1 capsule (200 mg) by mouth 2 times daily as needed for moderate pain 180 capsule 3     celecoxib (CELEBREX) 200 MG capsule Take 1 capsule (200 mg) by mouth daily -- With food as needed for arthritis pain 90 capsule 3     Cholecalciferol (VITAMIN D-3) 5000 units TABS Take 5,000 Units by mouth daily 100 tablet 3     citalopram (CELEXA) 20 MG tablet Take 1 tablet (20 mg) by mouth daily 90 tablet 3     gabapentin (NEURONTIN) 100 MG capsule TAKE 1-3 CAPSULES BY MOUTH FOUR TIMES DAILY AS NEEDED FOR BURNING OR SHOOTING NERVE PAIN 1080 capsule 11     glipiZIDE (GLUCOTROL) 5 MG tablet Take 1-2 tablets (5-10 mg) by mouth 3 times daily 540 tablet 3     MAPAP 500 MG tablet TAKE 2 TABLETS BY MOUTH EVERY 6 HOURS AS NEEDED. NO MORE THAN 4,000MG OF ACETAMINOPHEN DAILY 240 tablet 3     methocarbamol (ROBAXIN) 750 MG tablet TAKE 1 TABLET BY MOUTH THREE TIMES DAILY AS NEEDED FOR PAIN 90 tablet 3        Patient Active Problem List    Diagnosis Date Noted     Primary osteoarthritis involving multiple joints 12/05/2018     Priority: Medium     Chronic neck and back pain 12/05/2018     Priority: Medium     Arthritis of carpometacarpal (CMC) joints of both thumbs 08/17/2017     Priority: Medium     Uncontrolled type 2 diabetes mellitus without complication, without long-term current use of  insulin (H) 07/27/2017     Priority: Medium     Lumbar spinal stenosis 05/01/2017     Priority: Medium     Anxiety and depression 11/01/2016     Priority: Medium     Chronic midline low back pain with left-sided sciatica 11/01/2016     Priority: Medium     Chronic pain of right knee 11/01/2016     Priority: Medium     Overview:   Updated per 10/1/17 IMO import  Overview:   Updated per 10/1/17 IMO import       Cyst of left kidney 11/01/2016     Priority: Medium     Mixed hyperlipidemia 11/01/2016     Priority: Medium     Morbid obesity with BMI of 45.0-49.9, adult (H) 11/01/2016     Priority: Medium     GWEN on CPAP - uses nightly, finds helpful. currently 15 cm H20 - will increase to 18 cm H20 5/15/2018 11/01/2016     Priority: Medium     Achilles tendonitis, bilateral 07/22/2016     Priority: Medium     Health care maintenance 07/01/2016     Priority: Medium     Overview:   Colonoscopy: Age 50  ITALO/PSA: Given family history of early prostate disease, would check periodically to monitor for changes; last in 08/2015, recheck in 1-3 years  AAA screen: Not indicated  Immunizations: UTD on other vaccines.   Lipids/Annual Exam: Done 07/2016, repeat as needed for cholesterol management  Hepatitis C screen: not indicated       Vitamin D deficiency 09/23/2015     Priority: Medium     Family hx of prostate cancer 08/17/2015     Priority: Medium     Past Medical History:   Diagnosis Date     Achilles tendinitis of left lower extremity     7/22/2016     Dorsalgia     No Comments Provided     Obstructive sleep apnea     using nasal pillows; sleeping 8 hours     Other specified anxiety disorders     No Comments Provided     Polyosteoarthritis     has had injections     Past Surgical History:   Procedure Laterality Date     TONSILLECTOMY      as a child     Social History     Social History     Marital status:      Spouse name: N/A     Number of children: N/A     Years of education: N/A     Social History Main Topics      "Smoking status: Never Smoker     Smokeless tobacco: Never Used     Alcohol use 0.0 oz/week      Comment: Alcoholic Drinks/day: rarely     Drug use: No     Sexual activity: Yes     Partners: Female     Other Topics Concern     None     Social History Narrative    Originally from [place]; moved back to [facility] in 2012 after living there for another 3-4 years with wife; , no children.  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  Going to school in [place] for [employer] certification.     Family History   Problem Relation Age of Onset     Other - See Comments Father      Psychiatric illness     Other - See Comments Paternal Grandfather      Psychiatric illness     Other - See Comments Paternal Uncle      Psychiatric illness       EXAM:   Vitals:    12/05/18 1156   BP: 138/74   BP Location: Right arm   Patient Position: Sitting   Cuff Size: Adult Large   Pulse: 80   Weight: (!) 371 lb 2 oz (168.3 kg)       Current Pain Score: Severe Pain (6)     BP Readings from Last 3 Encounters:   12/05/18 138/74   05/15/18 134/70   02/21/18 130/88      Wt Readings from Last 3 Encounters:   12/05/18 (!) 371 lb 2 oz (168.3 kg)   05/15/18 (!) 370 lb 6 oz (168 kg)   02/21/18 (!) 375 lb (170.1 kg)      Estimated body mass index is 7,248.04 kg/(m^2) as calculated from the following:    Height as of 2/21/18: 6\" (0.152 m).    Weight as of this encounter: 371 lb 2 oz (168.3 kg).     Physical Exam   Constitutional: He appears well-developed and well-nourished.   HENT:   Head: Normocephalic and atraumatic.   Eyes: Conjunctivae are normal. No scleral icterus.   Cardiovascular: Normal rate and regular rhythm.    Pulmonary/Chest: Effort normal.   Abdominal: Soft.   Obesity   Musculoskeletal: He exhibits edema. He exhibits no tenderness or deformity.   Lymphadenopathy:     He has no cervical adenopathy.   Neurological: He is alert.   Skin: Skin is warm and dry.   Skin tags, melanocytic skin lesion on back, large tag under left arm  "   Psychiatric: He has a normal mood and affect.      Procedures   INVESTIGATIONS:  12/5/2018 - XR CERV SPINE INCL FLEX/EXT G/E 4 VW, XR THORACIC SPINE G/E 4 VIEWS     HISTORY: ; Chronic neck and back pain; Chronic neck and back pain;  Chronic neck and back pain .     TECHNIQUE: 5 views of the cervical spine.     COMPARISON: None.     FINDINGS:     There is limited assessment before C5-6 due to shoulder artifact.  Scattered mild disc height loss, facet hypertrophy and uncovertebral  hypertrophy is present. There is slightly limited range of motion  flexion, potentially due to habitus.       Thoracic vertebral body heights and alignment are maintained. Thoracic  kyphosis is maintained. Scattered mild endplate degenerative changes  and slight disc height loss is seen throughout the thoracic spine.     No acute fracture is identified.         IMPRESSION:      Scattered, predominantly mild degenerative changes of the cervical and  thoracic spines.       JOSE ESTRADA MD    Results for orders placed or performed in visit on 12/05/18   Comprehensive metabolic panel   Result Value Ref Range    Sodium 135 134 - 144 mmol/L    Potassium 4.0 3.5 - 5.1 mmol/L    Chloride 100 98 - 107 mmol/L    Carbon Dioxide 26 21 - 31 mmol/L    Anion Gap 9 3 - 14 mmol/L    Glucose 321 (H) 70 - 105 mg/dL    Urea Nitrogen 15 7 - 25 mg/dL    Creatinine 0.86 0.70 - 1.30 mg/dL    GFR Estimate Not Calculated >60 mL/min/1.7m2    GFR Estimate If Black Not Calculated >60 mL/min/1.7m2    Calcium 9.4 8.6 - 10.3 mg/dL    Bilirubin Total 1.2 (H) 0.3 - 1.0 mg/dL    Albumin 4.2 3.5 - 5.7 g/dL    Protein Total 7.5 6.4 - 8.9 g/dL    Alkaline Phosphatase 40 34 - 104 U/L    ALT 39 7 - 52 U/L    AST 27 13 - 39 U/L   CBC with platelets   Result Value Ref Range    WBC 5.5 4.0 - 11.0 10e9/L    RBC Count 6.24 (H) 4.4 - 5.9 10e12/L    Hemoglobin 16.5 13.3 - 17.7 g/dL    Hematocrit 49.6 40.0 - 53.0 %    MCV 80 78 - 100 fl    MCH 26.4 (L) 26.5 - 33.0 pg    MCHC  33.3 31.5 - 36.5 g/dL    RDW 13.2 10.0 - 15.0 %    Platelet Count 152 150 - 450 10e9/L   Lipid Profile   Result Value Ref Range    Cholesterol 243 (H) <200 mg/dL    Triglycerides 315 (H) <150 mg/dL    HDL Cholesterol 37 23 - 92 mg/dL    LDL Cholesterol Calculated 143 (H) <100 mg/dL    Non HDL Cholesterol 206 (H) <130 mg/dL   Hemoglobin A1c   Result Value Ref Range    Hemoglobin A1C 9.1 (H) 4.0 - 6.0 %   *UA reflex to Microscopic   Result Value Ref Range    Color Urine Yellow     Appearance Urine Clear     Glucose Urine >1000 (A) NEG^Negative mg/dL    Bilirubin Urine Negative NEG^Negative    Ketones Urine Negative NEG^Negative mg/dL    Specific Gravity Urine 1.020 1.000 - 1.030    Blood Urine Negative NEG^Negative    pH Urine 5.0 5.0 - 9.0 pH    Protein Albumin Urine Negative NEG^Negative mg/dL    Urobilinogen Urine 0.2 0.2 - 1.0 EU/dL    Nitrite Urine Negative NEG^Negative    Leukocyte Esterase Urine Negative NEG^Negative    Source Midstream Urine    Urine Microscopic   Result Value Ref Range    WBC Urine 0 - 5 OTO5^0 - 5 /HPF    RBC Urine O - 2 OTO2^O - 2 /HPF       ASSESSMENT AND PLAN:  Problem List Items Addressed This Visit        Nervous and Auditory    Chronic neck and back pain    Relevant Orders    XR Thoracic Spine G/E 4 Views (Completed)       Respiratory    GWEN on CPAP - uses nightly, finds helpful. currently 15 cm H20 - will increase to 18 cm H20 5/15/2018       Digestive    Morbid obesity with BMI of 45.0-49.9, adult (H)       Endocrine    Uncontrolled type 2 diabetes mellitus without complication, without long-term current use of insulin (H) - Primary    Relevant Orders    Urine Microscopic (Completed)    Mixed hyperlipidemia       Musculoskeletal and Integumentary    Primary osteoarthritis involving multiple joints       Other    Anxiety and depression      Other Visit Diagnoses     Solar aging of skin        Relevant Orders    DERMATOLOGY REFERRAL    Skin tag        Relevant Orders    DERMATOLOGY  REFERRAL    Melanocytic nevus of skin        Relevant Orders    DERMATOLOGY REFERRAL        reviewed diet, exercise and weight control, recommended sodium restriction  -- Expected clinical course discussed    -- Medications and their side effects discussed    The 10-year ASCVD risk score (Liammagalie COLLADO Jr, et al., 2013) is: 11.3%    Values used to calculate the score:      Age: 48 years      Sex: Male      Is Non- : No      Diabetic: Yes      Tobacco smoker: No      Systolic Blood Pressure: 138 mmHg      Is BP treated: No      HDL Cholesterol: 37 mg/dL      Total Cholesterol: 243 mg/dL    Patient Instructions     Flu shot today.     Medications refilled.     Xray and Labs today.     Dermatology referral sent  - they will call with date/time of appointment.       Return for Diabetes labs and clinic follow-up appointment every 3 to 4 months.  --- (Go for about 91 to 100 days)    Aspects of Diabetes we can improve:  Hemoglobin A1c Lab Results   Component Value Date    A1C 7.8 05/15/2018    Goal range is under 8. Best is 6.5 to 7   Blood Pressure 138/74 Goal to keep less than 140/90   Tobacco  reports that he has never smoked. He has never used smokeless tobacco. Goal to abstain from tobacco   Aspirin or Plavix Aspirin Aspirin or Plavix reduces risk of heart disease and stroke   ACE/ARB -- declined These medications reduce risk of kidney disease   Cholesterol -- declined Statins reduce risk of heart disease and stroke   Eye Exam -- Do Yearly -- Annual diabetic eye exam   Healthy weight Body mass index is 7,248.04 kg/(m^2). Goal BMI under 30, best is under 25.      -- Trying to exercise daily (goal at least 20 min/day) with moderate aerobic activity   -- Eat healthy (resources from ADA at http://www.diabetes.org/)   -- Taking good care of my feet. Consider seeing the Podiatrist   -- Check blood sugars as directed, record in log book and bring to every appointment      Schedule lab only appointment --- A  few days AFTER: 3/5/19   Schedule clinic appointment with Dr. Johnston -- Same day as labs, or 1-2 days later.     Insurance companies are now grading you and I on your blood sugar control -- Goal is to get your A1c down to 7.9% or lower and NO Smoking!    -- Medicare and most insurance companies, will only cover Hemoglobin A1c labs to be rechecked every 91+ days.      Hemoglobin A1C   Date Value Ref Range Status   05/15/2018 7.8 (H) 4.0 - 6.0 % Final       Next follow-up appointment with Dr. Johnston should be scheduled:  -- Approximately a few days AFTER: 3/5/19       Scott Johnston MD  Internal Medicine  [facility]     Portions of this note were dictated using speech recognition software. The note has been proofread but errors in the text may have been overlooked. Please contact me if there are any concerns regarding the accuracy of the dictation.

## 2018-12-05 NOTE — PATIENT INSTRUCTIONS
Flu shot today.     Medications refilled.     Xray and Labs today.     Dermatology referral sent  - they will call with date/time of appointment.       Return for Diabetes labs and clinic follow-up appointment every 3 to 4 months.  --- (Go for about 91 to 100 days)    Aspects of Diabetes we can improve:  Hemoglobin A1c Lab Results   Component Value Date    A1C 7.8 05/15/2018    Goal range is under 8. Best is 6.5 to 7   Blood Pressure 138/74 Goal to keep less than 140/90   Tobacco  reports that he has never smoked. He has never used smokeless tobacco. Goal to abstain from tobacco   Aspirin or Plavix Aspirin Aspirin or Plavix reduces risk of heart disease and stroke   ACE/ARB -- declined These medications reduce risk of kidney disease   Cholesterol -- declined Statins reduce risk of heart disease and stroke   Eye Exam -- Do Yearly -- Annual diabetic eye exam   Healthy weight Body mass index is 7,248.04 kg/(m^2). Goal BMI under 30, best is under 25.      -- Trying to exercise daily (goal at least 20 min/day) with moderate aerobic activity   -- Eat healthy (resources from ADA at http://www.diabetes.org/)   -- Taking good care of my feet. Consider seeing the Podiatrist   -- Check blood sugars as directed, record in log book and bring to every appointment      Schedule lab only appointment --- A few days AFTER: 3/5/19   Schedule clinic appointment with Dr. Johnston -- Same day as labs, or 1-2 days later.     Insurance companies are now grading you and I on your blood sugar control -- Goal is to get your A1c down to 7.9% or lower and NO Smoking!    -- Medicare and most insurance companies, will only cover Hemoglobin A1c labs to be rechecked every 91+ days.      Hemoglobin A1C   Date Value Ref Range Status   05/15/2018 7.8 (H) 4.0 - 6.0 % Final       Next follow-up appointment with Dr. Johnston should be scheduled:  -- Approximately a few days AFTER: 3/5/19

## 2018-12-05 NOTE — LETTER
December 5, 2018    Shelton Puentes  [address]  [facility] [address]      Dear Shelton Puentes,    The result of your recent tests are included below:    Cholesterol levels are very high.    Random blood sugar today is very high.    Hemoglobin A1c is now uncontrolled.    Large amount of urine glucose noted.    Schedule close clinic follow-up appointment.  We need to make some major medication changes for your diabetes.    Results for orders placed or performed in visit on 12/05/18   Comprehensive metabolic panel   Result Value Ref Range    Sodium 135 134 - 144 mmol/L    Potassium 4.0 3.5 - 5.1 mmol/L    Chloride 100 98 - 107 mmol/L    Carbon Dioxide 26 21 - 31 mmol/L    Anion Gap 9 3 - 14 mmol/L    Glucose 321 (H) 70 - 105 mg/dL    Urea Nitrogen 15 7 - 25 mg/dL    Creatinine 0.86 0.70 - 1.30 mg/dL    GFR Estimate Not Calculated >60 mL/min/1.7m2    GFR Estimate If Black Not Calculated >60 mL/min/1.7m2    Calcium 9.4 8.6 - 10.3 mg/dL    Bilirubin Total 1.2 (H) 0.3 - 1.0 mg/dL    Albumin 4.2 3.5 - 5.7 g/dL    Protein Total 7.5 6.4 - 8.9 g/dL    Alkaline Phosphatase 40 34 - 104 U/L    ALT 39 7 - 52 U/L    AST 27 13 - 39 U/L   CBC with platelets   Result Value Ref Range    WBC 5.5 4.0 - 11.0 10e9/L    RBC Count 6.24 (H) 4.4 - 5.9 10e12/L    Hemoglobin 16.5 13.3 - 17.7 g/dL    Hematocrit 49.6 40.0 - 53.0 %    MCV 80 78 - 100 fl    MCH 26.4 (L) 26.5 - 33.0 pg    MCHC 33.3 31.5 - 36.5 g/dL    RDW 13.2 10.0 - 15.0 %    Platelet Count 152 150 - 450 10e9/L   Lipid Profile   Result Value Ref Range    Cholesterol 243 (H) <200 mg/dL    Triglycerides 315 (H) <150 mg/dL    HDL Cholesterol 37 23 - 92 mg/dL    LDL Cholesterol Calculated 143 (H) <100 mg/dL    Non HDL Cholesterol 206 (H) <130 mg/dL   Hemoglobin A1c   Result Value Ref Range    Hemoglobin A1C 9.1 (H) 4.0 - 6.0 %   *UA reflex to Microscopic   Result Value Ref Range    Color Urine Yellow     Appearance Urine Clear     Glucose Urine >1000 (A)  NEG^Negative mg/dL    Bilirubin Urine Negative NEG^Negative    Ketones Urine Negative NEG^Negative mg/dL    Specific Gravity Urine 1.020 1.000 - 1.030    Blood Urine Negative NEG^Negative    pH Urine 5.0 5.0 - 9.0 pH    Protein Albumin Urine Negative NEG^Negative mg/dL    Urobilinogen Urine 0.2 0.2 - 1.0 EU/dL    Nitrite Urine Negative NEG^Negative    Leukocyte Esterase Urine Negative NEG^Negative    Source Midstream Urine    Urine Microscopic   Result Value Ref Range    WBC Urine 0 - 5 OTO5^0 - 5 /HPF    RBC Urine O - 2 OTO2^O - 2 /HPF       If you have any further questions or problems, please contact my office at [phone] and schedule an appointment.    Clinic : [phone]  Appointment line: [phone]     Thank you,    Scott Johnston MD    Internal Medicine  [facility]     Reviewed and electronically signed by provider.

## 2018-12-07 ASSESSMENT — ANXIETY QUESTIONNAIRES: GAD7 TOTAL SCORE: 0

## 2018-12-10 ENCOUNTER — TELEPHONE (OUTPATIENT)
Dept: INTERNAL MEDICINE | Facility: OTHER | Age: 48
End: 2018-12-10

## 2018-12-10 NOTE — TELEPHONE ENCOUNTER
Spoke to patient.  Patient stated that he had gotten a letter in the mail that stated that he should make an appointment to discuss medication with Scott Johnston MD.  This writer transferred patient to the appointment line to assist in making an appointment.  Shari Ding LPN 12/10/2018   3:44 PM

## 2018-12-20 ENCOUNTER — OFFICE VISIT (OUTPATIENT)
Dept: INTERNAL MEDICINE | Facility: OTHER | Age: 48
End: 2018-12-20
Attending: INTERNAL MEDICINE
Payer: COMMERCIAL

## 2018-12-20 VITALS
HEART RATE: 76 BPM | SYSTOLIC BLOOD PRESSURE: 134 MMHG | BODY MASS INDEX: 7245.6 KG/M2 | DIASTOLIC BLOOD PRESSURE: 78 MMHG | WEIGHT: 315 LBS

## 2018-12-20 DIAGNOSIS — E78.2 MIXED HYPERLIPIDEMIA: ICD-10-CM

## 2018-12-20 DIAGNOSIS — S40.812A ABRASION OF LEFT UPPER EXTREMITY, INITIAL ENCOUNTER: ICD-10-CM
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
## 2018-12-20 PROCEDURE — 99214 OFFICE O/P EST MOD 30 MIN: CPT | Performed by: INTERNAL MEDICINE

## 2018-12-20 PROCEDURE — G0463 HOSPITAL OUTPT CLINIC VISIT: HCPCS

## 2018-12-20 RX ORDER — PIOGLITAZONEHYDROCHLORIDE 45 MG/1
TABLET ORAL
Qty: 90 TABLET | Refills: 3 | Status: SHIPPED | OUTPATIENT
Start: 2018-12-20 | End: 2019-01-15

## 2018-12-20 ASSESSMENT — ANXIETY QUESTIONNAIRES
1. FEELING NERVOUS, ANXIOUS, OR ON EDGE: NOT AT ALL
2. NOT BEING ABLE TO STOP OR CONTROL WORRYING: NOT AT ALL
7. FEELING AFRAID AS IF SOMETHING AWFUL MIGHT HAPPEN: NOT AT ALL
5. BEING SO RESTLESS THAT IT IS HARD TO SIT STILL: NOT AT ALL
6. BECOMING EASILY ANNOYED OR IRRITABLE: NOT AT ALL
IF YOU CHECKED OFF ANY PROBLEMS ON THIS QUESTIONNAIRE, HOW DIFFICULT HAVE THESE PROBLEMS MADE IT FOR YOU TO DO YOUR WORK, TAKE CARE OF THINGS AT HOME, OR GET ALONG WITH OTHER PEOPLE: NOT DIFFICULT AT ALL
3. WORRYING TOO MUCH ABOUT DIFFERENT THINGS: NOT AT ALL
GAD7 TOTAL SCORE: 0

## 2018-12-20 ASSESSMENT — ENCOUNTER SYMPTOMS
DYSURIA: 0
VOMITING: 0
DIARRHEA: 0
CHILLS: 0
ARTHRALGIAS: 1
SHORTNESS OF BREATH: 0
MYALGIAS: 0
WHEEZING: 0
FEVER: 0
PALPITATIONS: 0
ABDOMINAL PAIN: 0
AGITATION: 0
CONFUSION: 0
FATIGUE: 0
DIZZINESS: 0
HEMATURIA: 0
BRUISES/BLEEDS EASILY: 0
LIGHT-HEADEDNESS: 0
COUGH: 0
EYE PAIN: 0
NAUSEA: 0

## 2018-12-20 ASSESSMENT — PATIENT HEALTH QUESTIONNAIRE - PHQ9
SUM OF ALL RESPONSES TO PHQ QUESTIONS 1-9: 0
5. POOR APPETITE OR OVEREATING: NOT AT ALL

## 2018-12-20 ASSESSMENT — PAIN SCALES - GENERAL: PAINLEVEL: MILD PAIN (3)

## 2018-12-20 NOTE — PROGRESS NOTES
"Nursing Notes:   Reanna Cedeno LPN  12/20/2018  9:48 AM  Signed  Patient presents to the clinic for medication follow up.      Previous A1C is not at goal of <8  Lab Results   Component Value Date    A1C 9.1 12/05/2018    A1C 7.8 05/15/2018     Urine microalbumin:creatine: n/a  Foot exam unknown-declines today  Eye exam 4-2018    Tobacco User no  Patient is on a daily aspirin  Patient is not on a Statin.  Blood pressure today of:     BP Readings from Last 1 Encounters:   12/05/18 138/74      is at the goal of <139/89 for diabetics.    Chief Complaint   Patient presents with     Recheck Medication       Initial There were no vitals taken for this visit. Estimated body mass index is 7,248.04 kg/m  as calculated from the following:    Height as of 2/21/18: 0.152 m (6\").    Weight as of 12/5/18: 168.3 kg (371 lb 2 oz).  Medication Reconciliation: complete    Reanna Cedeno LPN        Nursing note reviewed with patient.  Accuracy and completeness verified.   Mr. Puentes is a 48 year old male who:  Patient presents with:  Recheck Medication      ICD-10-CM    1. Uncontrolled type 2 diabetes mellitus without complication, without long-term current use of insulin (H) E11.65 pioglitazone (ACTOS) 45 MG tablet   2. Mixed hyperlipidemia E78.2    3. Fall due to slipping on ice or snow, initial encounter W00.9XXA    4. Abrasion of left upper extremity, initial encounter S40.812A      HPI  Uncontrolled type 2 diabetes.  Was quite well controlled with just glipizide.  Unfortunately, possibly due to change in diet, weight gain, reduced activity, blood sugars are now uncontrolled.    He actually was increasing his glipizide dose, on his own, up to 15 mg 3 times a day but really did not bring his blood sugars down.  He thinks that most of this is related to change in  of the glipizide.  His previous pills were a lot larger than his current ones.  Advised he talk with the pharmacy about this.    In the meantime, we " will start Actos initially half of a 45 mg tablet daily for a few days or week and I would then up to 45 mg daily.  We did discuss the potential for some fluid retention.  He will monitor this.    Advised possibility of reduced insulin production.  And possible need for insulin injections.  He is really not an advocate for this plan.    Obesity, discussed need for reduced oral caloric intake.  Regular exercise.  Reduce carbohydrate intake.  Information printed and reviewed.    Hyperlipidemia, currently diet controlled.  He declines statin therapy.    Slipped outside the building today.  Has superficial abrasion of left forearm.  Bumped his knee slightly, but no significant injury.  Was advised needed to have this looked at today.  --Reassurance provided.  Symptomatic management.  Ice packs if needed.  Monitor for development of any infections.  Topical antibiotics if needed.    Functional Capacity: about 4 METS.   No orthopnea/paroxysmal nocturnal dyspnea  Review of Systems   Constitutional: Negative for chills, fatigue and fever.   HENT: Negative for congestion and hearing loss.    Eyes: Negative for pain and visual disturbance.   Respiratory: Negative for cough, shortness of breath and wheezing.    Cardiovascular: Negative for chest pain and palpitations.   Gastrointestinal: Negative for abdominal pain, diarrhea, nausea and vomiting.   Endocrine: Negative for cold intolerance and heat intolerance.   Genitourinary: Negative for dysuria and hematuria.   Musculoskeletal: Positive for arthralgias. Negative for myalgias.   Skin: Negative for pallor.   Allergic/Immunologic: Negative for immunocompromised state.   Neurological: Negative for dizziness and light-headedness.   Hematological: Does not bruise/bleed easily.   Psychiatric/Behavioral: Negative for agitation and confusion.      ASHVIN:   ASHVIN-7 SCORE 5/15/2018 12/5/2018 12/20/2018   Total Score 0 0 0     PHQ9:  PHQ-9 SCORE 5/15/2018 12/5/2018 12/20/2018   PHQ-9 Total  Score 0 0 0       I have personally reviewed the past medical history, past surgical history, medications, allergies, family and social history as listed below, on 12/20/2018.    Allergies   Allergen Reactions     Ace Inhibitors Other (See Comments)     - Declined     Hmg-Coa-R Inhibitors Other (See Comments)     -- declines at this time, re-address at next clinic appointment --     Metformin Other (See Comments)     Felt very foggy / groggy after taking, when in combination with Robaxin.        Current Outpatient Medications   Medication Sig Dispense Refill     aspirin 81 MG tablet Take 1 tablet (81 mg) by mouth daily -- ONCE weekly -- at most 90 tablet 3     blood glucose (NO BRAND SPECIFIED) lancets standard Dispense item covered by pt ins. E11.65 NIDDM type II, uncontrolled - Test 3 times/day, Reason: High A1C, new start Glipizide       blood glucose monitoring (ACCU-CHEK FASTCLIX) lancets USE TO TEST STRIPS THREE TIMES DAILY. 400 each 0     blood glucose monitoring (ACCU-CHEK SMARTVIEW) test strip Test blood sugar three times daily. Dx: E11.9 300 strip 0     Blood Glucose Monitoring Suppl (FIFTY50 GLUCOSE METER 2.0) W/DEVICE KIT Dispense item covered by pt ins. E11.65 NIDDM type II, uncontrolled - Test 3 time/day       celecoxib (CELEBREX) 200 MG capsule Take 1 capsule (200 mg) by mouth 2 times daily as needed for moderate pain 180 capsule 3     celecoxib (CELEBREX) 200 MG capsule Take 1 capsule (200 mg) by mouth daily -- With food as needed for arthritis pain 90 capsule 3     Cholecalciferol (VITAMIN D-3) 5000 units TABS Take 5,000 Units by mouth daily 100 tablet 3     citalopram (CELEXA) 20 MG tablet Take 1 tablet (20 mg) by mouth daily 90 tablet 3     gabapentin (NEURONTIN) 100 MG capsule TAKE 1-3 CAPSULES BY MOUTH FOUR TIMES DAILY AS NEEDED FOR BURNING OR SHOOTING NERVE PAIN 1080 capsule 11     glipiZIDE (GLUCOTROL) 5 MG tablet Take 1-2 tablets (5-10 mg) by mouth 3 times daily 540 tablet 3     MAPAP 500 MG  tablet TAKE 2 TABLETS BY MOUTH EVERY 6 HOURS AS NEEDED. NO MORE THAN 4,000MG OF ACETAMINOPHEN DAILY 240 tablet 3     methocarbamol (ROBAXIN) 750 MG tablet TAKE 1 TABLET BY MOUTH THREE TIMES DAILY AS NEEDED FOR PAIN 90 tablet 3     pioglitazone (ACTOS) 45 MG tablet Take 1/2 tablet daily for 4 days, then 1 tablet daily. 90 tablet 3        Patient Active Problem List    Diagnosis Date Noted     Primary osteoarthritis involving multiple joints 12/05/2018     Priority: Medium     Chronic neck and back pain 12/05/2018     Priority: Medium     Arthritis of carpometacarpal (CMC) joints of both thumbs 08/17/2017     Priority: Medium     Uncontrolled type 2 diabetes mellitus without complication, without long-term current use of insulin (H) 07/27/2017     Priority: Medium     Lumbar spinal stenosis 05/01/2017     Priority: Medium     Anxiety and depression 11/01/2016     Priority: Medium     Chronic midline low back pain with left-sided sciatica 11/01/2016     Priority: Medium     Chronic pain of right knee 11/01/2016     Priority: Medium     Overview:   Updated per 10/1/17 IMO import  Overview:   Updated per 10/1/17 IMO import       Cyst of left kidney 11/01/2016     Priority: Medium     Mixed hyperlipidemia 11/01/2016     Priority: Medium     Morbid obesity with BMI of 45.0-49.9, adult (H) 11/01/2016     Priority: Medium     GWEN on CPAP - uses nightly, finds helpful. currently 15 cm H20 - will increase to 18 cm H20 5/15/2018 11/01/2016     Priority: Medium     Achilles tendonitis, bilateral 07/22/2016     Priority: Medium     Health care maintenance 07/01/2016     Priority: Medium     Overview:   Colonoscopy: Age 50  ITALO/PSA: Given family history of early prostate disease, would check periodically to monitor for changes; last in 08/2015, recheck in 1-3 years  AAA screen: Not indicated  Immunizations: UTD on other vaccines.   Lipids/Annual Exam: Done 07/2016, repeat as needed for cholesterol management  Hepatitis C screen:  not indicated       Vitamin D deficiency 09/23/2015     Priority: Medium     Family hx of prostate cancer 08/17/2015     Priority: Medium     Past Medical History:   Diagnosis Date     Achilles tendinitis of left lower extremity     7/22/2016     Dorsalgia     No Comments Provided     Obstructive sleep apnea     using nasal pillows; sleeping 8 hours     Other specified anxiety disorders     No Comments Provided     Polyosteoarthritis     has had injections     Past Surgical History:   Procedure Laterality Date     TONSILLECTOMY      as a child     Social History     Socioeconomic History     Marital status:      Spouse name: None     Number of children: None     Years of education: None     Highest education level: None   Social Needs     Financial resource strain: None     Food insecurity - worry: None     Food insecurity - inability: None     Transportation needs - medical: None     Transportation needs - non-medical: None   Occupational History     None   Tobacco Use     Smoking status: Never Smoker     Smokeless tobacco: Never Used   Substance and Sexual Activity     Alcohol use: Yes     Alcohol/week: 0.0 oz     Comment: Alcoholic Drinks/day: rarely     Drug use: No     Sexual activity: Yes     Partners: Female   Other Topics Concern     Parent/sibling w/ CABG, MI or angioplasty before 65F 55M? Not Asked   Social History Narrative    Originally from [place]; moved back to [facility] in 2012 after living there for another 3-4 years with wife; , no children.  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  Going to school in [place] for [employer] certification.     Family History   Problem Relation Age of Onset     Other - See Comments Father         Psychiatric illness     Other - See Comments Paternal Grandfather         Psychiatric illness     Other - See Comments Paternal Uncle         Psychiatric illness       EXAM:   Vitals:    12/20/18 0942   BP: 134/78   BP Location: Right arm   Patient Position:  "Sitting   Cuff Size: Adult Regular   Pulse: 76   Weight: (!) 168.3 kg (371 lb)       Current Pain Score: Mild Pain (3)     BP Readings from Last 3 Encounters:   12/20/18 134/78   12/05/18 138/74   05/15/18 134/70      Wt Readings from Last 3 Encounters:   12/20/18 (!) 168.3 kg (371 lb)   12/05/18 (!) 168.3 kg (371 lb 2 oz)   05/15/18 (!) 168 kg (370 lb 6 oz)      Estimated body mass index is 7,245.6 kg/m  as calculated from the following:    Height as of 2/21/18: 0.152 m (6\").    Weight as of this encounter: 168.3 kg (371 lb).     Physical Exam   Constitutional: He appears well-developed and well-nourished. No distress.   HENT:   Head: Normocephalic and atraumatic.   Eyes: Conjunctivae are normal. No scleral icterus.   Neck: Neck supple.   Cardiovascular: Normal rate and regular rhythm.   Pulmonary/Chest: Effort normal.   Abdominal: Soft. There is no tenderness.   Musculoskeletal: He exhibits edema. He exhibits no deformity.   Minimal tenderness of left anterior knee to palpation.  Normal range of motion.   Lymphadenopathy:     He has no cervical adenopathy.   Neurological: He is alert.   Skin: Skin is warm and dry. No rash (Minor, superficial abrasion on ulnar aspect of left forearm.) noted. He is not diaphoretic.   Psychiatric: He has a normal mood and affect.      Procedures   INVESTIGATIONS:  Results for orders placed or performed during the hospital encounter of 12/05/18   XR Thoracic Spine G/E 4 Views    Narrative    XR CERV SPINE INCL FLEX/EXT G/E 4 VW, XR THORACIC SPINE G/E 4 VIEWS    HISTORY: ; Chronic neck and back pain; Chronic neck and back pain;  Chronic neck and back pain .    TECHNIQUE: 5 views of the cervical spine.    COMPARISON: None.    FINDINGS:    There is limited assessment before C5-6 due to shoulder artifact.  Scattered mild disc height loss, facet hypertrophy and uncovertebral  hypertrophy is present. There is slightly limited range of motion  flexion, potentially due to habitus.  "     Thoracic vertebral body heights and alignment are maintained. Thoracic  kyphosis is maintained. Scattered mild endplate degenerative changes  and slight disc height loss is seen throughout the thoracic spine.    No acute fracture is identified.      Impression    IMPRESSION:     Scattered, predominantly mild degenerative changes of the cervical and  thoracic spines.      JOSE ESTRADA MD       ASSESSMENT AND PLAN:  Problem List Items Addressed This Visit        Endocrine    Uncontrolled type 2 diabetes mellitus without complication, without long-term current use of insulin (H) - Primary    Relevant Medications    pioglitazone (ACTOS) 45 MG tablet    Mixed hyperlipidemia      Other Visit Diagnoses     Fall due to slipping on ice or snow, initial encounter        Abrasion of left upper extremity, initial encounter            reviewed diet, exercise and weight control, cardiovascular risk and specific lipid/LDL goals reviewed, specific diabetic recommendations low cholesterol diet, weight control and daily exercise discussed, use of aspirin to prevent MI and TIA's discussed  -- Expected clinical course discussed    -- Medications and their side effects discussed    The 10-year ASCVD risk score (Liam COLLADO Jr., et al., 2013) is: 10.8%    Values used to calculate the score:      Age: 48 years      Sex: Male      Is Non- : No      Diabetic: Yes      Tobacco smoker: No      Systolic Blood Pressure: 134 mmHg      Is BP treated: No      HDL Cholesterol: 37 mg/dL      Total Cholesterol: 243 mg/dL    Patient Instructions     Uncertain your new Glipizide tablets are working...     Take your old ones back to [facility] and see if they can get you more of those again instead.     Blood sugars recently high..     Hemoglobin A1C (%)   Date Value   12/05/2018 9.1 (H)   05/15/2018 7.8 (H)     Creatinine (mg/dL)   Date Value   12/05/2018 0.86   05/15/2018 0.72   12/12/2017 0.75     Glucose (mg/dL)   Date  Value   12/05/2018 321 (H)   05/15/2018 153 (H)   12/12/2017 107 (H)   07/27/2017 178 (H)   04/21/2017 303 (H)   11/08/2016 174 (H)     1. Uncontrolled type 2 diabetes mellitus without complication, without long-term current use of insulin (H)    START:   - pioglitazone (ACTOS) 45 MG tablet; Take 1/2 tablet daily for 4 days, then 1 tablet daily.  Dispense: 90 tablet; Refill: 3    2. Mixed hyperlipidemia    To help with weight loss and improve blood sugar control....    -- Try to avoid Carbohydrates as much as possible -- breads, pasta, baked goods, cakes, oatmeal, cold cereal, potatoes.   These are turned to sugar in one metabolic conversion, cause insulin secretion and increased fat deposition / weight gain.      -- Eat more lean meats, proteins, eggs, nuts, vegetables.       Return for Diabetes labs and clinic follow-up appointment every 3 to 4 months.  --- (Go for about 91 to 100 days)    Aspects of Diabetes we can improve:  Hemoglobin A1c Lab Results   Component Value Date    A1C 9.1 12/05/2018    A1C 7.8 05/15/2018    Goal range is under 8. Best is 6.5 to 7   Blood Pressure 134/78 Goal to keep less than 140/90   Tobacco  reports that  has never smoked. he has never used smokeless tobacco. Goal to abstain from tobacco   Eye Exam -- Do Yearly -- Annual diabetic eye exam   Healthy weight Body mass index is 7,245.6 kg/m . Goal BMI under 30, best is under 25.      -- Trying to exercise daily (goal at least 20 min/day) with moderate aerobic activity   -- Eat healthy (resources from ADA at http://www.diabetes.org/)   -- Taking good care of my feet. Consider seeing the Podiatrist   -- Check blood sugars as directed, record in log book and bring to every appointment      Schedule lab only appointment --- A few days AFTER: 3/20/19   Schedule clinic appointment with Dr. Johnston -- Same day as labs, or 1-2 days later.     Insurance companies are now grading you and I on your blood sugar control -- Goal is to get your A1c  down to 7.9% or lower and NO Smoking!    -- Medicare and most insurance companies, will only cover Hemoglobin A1c labs to be rechecked every 91+ days.      Hemoglobin A1C   Date Value Ref Range Status   12/05/2018 9.1 (H) 4.0 - 6.0 % Final   05/15/2018 7.8 (H) 4.0 - 6.0 % Final       Next follow-up appointment with Dr. Johnston should be scheduled:  -- Approximately a few days AFTER: 3/20/19         Scott Johnston MD  Internal Medicine  [facility]     Portions of this note were dictated using speech recognition software. The note has been proofread but errors in the text may have been overlooked. Please contact me if there are any concerns regarding the accuracy of the dictation.

## 2018-12-20 NOTE — PATIENT INSTRUCTIONS
Uncertain your new Glipizide tablets are working...     Take your old ones back to [facility] and see if they can get you more of those again instead.     Blood sugars recently high..     Hemoglobin A1C (%)   Date Value   12/05/2018 9.1 (H)   05/15/2018 7.8 (H)     Creatinine (mg/dL)   Date Value   12/05/2018 0.86   05/15/2018 0.72   12/12/2017 0.75     Glucose (mg/dL)   Date Value   12/05/2018 321 (H)   05/15/2018 153 (H)   12/12/2017 107 (H)   07/27/2017 178 (H)   04/21/2017 303 (H)   11/08/2016 174 (H)     1. Uncontrolled type 2 diabetes mellitus without complication, without long-term current use of insulin (H)    START:   - pioglitazone (ACTOS) 45 MG tablet; Take 1/2 tablet daily for 4 days, then 1 tablet daily.  Dispense: 90 tablet; Refill: 3    2. Mixed hyperlipidemia    To help with weight loss and improve blood sugar control....    -- Try to avoid Carbohydrates as much as possible -- breads, pasta, baked goods, cakes, oatmeal, cold cereal, potatoes.   These are turned to sugar in one metabolic conversion, cause insulin secretion and increased fat deposition / weight gain.      -- Eat more lean meats, proteins, eggs, nuts, vegetables.       Return for Diabetes labs and clinic follow-up appointment every 3 to 4 months.  --- (Go for about 91 to 100 days)    Aspects of Diabetes we can improve:  Hemoglobin A1c Lab Results   Component Value Date    A1C 9.1 12/05/2018    A1C 7.8 05/15/2018    Goal range is under 8. Best is 6.5 to 7   Blood Pressure 134/78 Goal to keep less than 140/90   Tobacco  reports that  has never smoked. he has never used smokeless tobacco. Goal to abstain from tobacco   Eye Exam -- Do Yearly -- Annual diabetic eye exam   Healthy weight Body mass index is 7,245.6 kg/m . Goal BMI under 30, best is under 25.      -- Trying to exercise daily (goal at least 20 min/day) with moderate aerobic activity   -- Eat healthy (resources from ADA at http://www.diabetes.org/)   -- Taking good care of my  feet. Consider seeing the Podiatrist   -- Check blood sugars as directed, record in log book and bring to every appointment      Schedule lab only appointment --- A few days AFTER: 3/20/19   Schedule clinic appointment with Dr. Johnston -- Same day as labs, or 1-2 days later.     Insurance companies are now grading you and I on your blood sugar control -- Goal is to get your A1c down to 7.9% or lower and NO Smoking!    -- Medicare and most insurance companies, will only cover Hemoglobin A1c labs to be rechecked every 91+ days.      Hemoglobin A1C   Date Value Ref Range Status   12/05/2018 9.1 (H) 4.0 - 6.0 % Final   05/15/2018 7.8 (H) 4.0 - 6.0 % Final       Next follow-up appointment with Dr. Johnston should be scheduled:  -- Approximately a few days AFTER: 3/20/19

## 2018-12-20 NOTE — NURSING NOTE
"Patient presents to the clinic for medication follow up.      Previous A1C is not at goal of <8  Lab Results   Component Value Date    A1C 9.1 12/05/2018    A1C 7.8 05/15/2018     Urine microalbumin:creatine: n/a  Foot exam unknown-declines today  Eye exam 4-2018    Tobacco User no  Patient is on a daily aspirin  Patient is not on a Statin.  Blood pressure today of:     BP Readings from Last 1 Encounters:   12/05/18 138/74      is at the goal of <139/89 for diabetics.    Chief Complaint   Patient presents with     Recheck Medication       Initial There were no vitals taken for this visit. Estimated body mass index is 7,248.04 kg/m  as calculated from the following:    Height as of 2/21/18: 0.152 m (6\").    Weight as of 12/5/18: 168.3 kg (371 lb 2 oz).  Medication Reconciliation: complete    Reanna Cedeno LPN        "

## 2018-12-21 ASSESSMENT — ANXIETY QUESTIONNAIRES: GAD7 TOTAL SCORE: 0

## 2019-01-06 DIAGNOSIS — G89.29 OTHER CHRONIC PAIN: ICD-10-CM

## 2019-01-08 RX ORDER — METHOCARBAMOL 750 MG/1
TABLET, FILM COATED ORAL
Qty: 90 TABLET | Refills: 0 | Status: SHIPPED | OUTPATIENT
Start: 2019-01-08 | End: 2019-02-07

## 2019-01-08 NOTE — TELEPHONE ENCOUNTER
Routing refill request to provider for review/approval because:  Drug not on the [facility] refill protocol     Robaxin filled 8-14-18 for 1 month x 3 refills. LOV 12-20-18. Maxine Beltran RN on 1/8/2019 at 3:22 PM

## 2019-01-11 ENCOUNTER — TELEPHONE (OUTPATIENT)
Dept: INTERNAL MEDICINE | Facility: OTHER | Age: 49
End: 2019-01-11

## 2019-01-11 ENCOUNTER — MYC MEDICAL ADVICE (OUTPATIENT)
Dept: INTERNAL MEDICINE | Facility: OTHER | Age: 49
End: 2019-01-11

## 2019-01-11 NOTE — TELEPHONE ENCOUNTER
Patient needs a referral to see Ortho Surgeon at the [facility] and also needs an MRI from the neck to tailbone-patient requests open MRI due to claustrophobia.       Reanna Cedeno LPN 1/11/2019 9:33 AM

## 2019-01-11 NOTE — TELEPHONE ENCOUNTER
I called Skye back regarding an insurance referral for Shelton to [facility] and I told her that I sent it into [facility].  She did however say that she had sent a message through [employer] on 1/6/19 or 1/7/19 (which I don't see) and still hasn't gotten a call back.  A referral is needed for Shelton to be seen at the [facility] and for an MRI.  Please call her.  Indira Frederick on 1/11/2019 at 9:03 AM

## 2019-01-11 NOTE — TELEPHONE ENCOUNTER
Patient and spouse with Scott Johnston MD on 1-16-19, at 3 pm.      Reanna Cedeno LPN 1/11/2019 10:35 AM

## 2019-01-15 ENCOUNTER — OFFICE VISIT (OUTPATIENT)
Dept: INTERNAL MEDICINE | Facility: OTHER | Age: 49
End: 2019-01-15
Attending: INTERNAL MEDICINE
Payer: COMMERCIAL

## 2019-01-15 VITALS
SYSTOLIC BLOOD PRESSURE: 136 MMHG | DIASTOLIC BLOOD PRESSURE: 78 MMHG | HEART RATE: 64 BPM | WEIGHT: 315 LBS | BODY MASS INDEX: 7245.6 KG/M2

## 2019-01-15 DIAGNOSIS — M54.9 CHRONIC NECK AND BACK PAIN: ICD-10-CM

## 2019-01-15 DIAGNOSIS — M48.061 SPINAL STENOSIS OF LUMBAR REGION, UNSPECIFIED WHETHER NEUROGENIC CLAUDICATION PRESENT: ICD-10-CM

## 2019-01-15 DIAGNOSIS — E66.01 MORBID OBESITY WITH BMI OF 45.0-49.9, ADULT (H): ICD-10-CM

## 2019-01-15 DIAGNOSIS — M54.2 CHRONIC NECK AND BACK PAIN: ICD-10-CM

## 2019-01-15 DIAGNOSIS — G89.29 CHRONIC NECK AND BACK PAIN: ICD-10-CM

## 2019-01-15 PROCEDURE — 99214 OFFICE O/P EST MOD 30 MIN: CPT | Performed by: INTERNAL MEDICINE

## 2019-01-15 PROCEDURE — G0463 HOSPITAL OUTPT CLINIC VISIT: HCPCS

## 2019-01-15 RX ORDER — GLIPIZIDE 5 MG/1
5-10 TABLET ORAL 3 TIMES DAILY
COMMUNITY
Start: 2019-01-15 | End: 2019-07-29

## 2019-01-15 ASSESSMENT — ENCOUNTER SYMPTOMS
CONFUSION: 0
ABDOMINAL PAIN: 0
LIGHT-HEADEDNESS: 0
ARTHRALGIAS: 1
WHEEZING: 0
CHILLS: 0
FEVER: 0
EYE PAIN: 0
SHORTNESS OF BREATH: 0
COUGH: 0
NAUSEA: 0
DYSURIA: 0
BACK PAIN: 1
MYALGIAS: 0
HEMATURIA: 0
PALPITATIONS: 0
HEADACHES: 1
BRUISES/BLEEDS EASILY: 0
FATIGUE: 0
DIARRHEA: 0
AGITATION: 0
VOMITING: 0
DIZZINESS: 0

## 2019-01-15 ASSESSMENT — PAIN SCALES - GENERAL: PAINLEVEL: EXTREME PAIN (9)

## 2019-01-15 ASSESSMENT — PATIENT HEALTH QUESTIONNAIRE - PHQ9
SUM OF ALL RESPONSES TO PHQ QUESTIONS 1-9: 0
5. POOR APPETITE OR OVEREATING: NOT AT ALL

## 2019-01-15 ASSESSMENT — ANXIETY QUESTIONNAIRES
7. FEELING AFRAID AS IF SOMETHING AWFUL MIGHT HAPPEN: NOT AT ALL
6. BECOMING EASILY ANNOYED OR IRRITABLE: NOT AT ALL
2. NOT BEING ABLE TO STOP OR CONTROL WORRYING: NOT AT ALL
5. BEING SO RESTLESS THAT IT IS HARD TO SIT STILL: NOT AT ALL
GAD7 TOTAL SCORE: 0
IF YOU CHECKED OFF ANY PROBLEMS ON THIS QUESTIONNAIRE, HOW DIFFICULT HAVE THESE PROBLEMS MADE IT FOR YOU TO DO YOUR WORK, TAKE CARE OF THINGS AT HOME, OR GET ALONG WITH OTHER PEOPLE: NOT DIFFICULT AT ALL
1. FEELING NERVOUS, ANXIOUS, OR ON EDGE: NOT AT ALL
3. WORRYING TOO MUCH ABOUT DIFFERENT THINGS: NOT AT ALL

## 2019-01-15 NOTE — NURSING NOTE
"Patient presents to the clinic for follow up with referrals.      Chief Complaint   Patient presents with     Clinic Care Coordination - Follow-up       Initial /78 (BP Location: Right arm, Patient Position: Sitting, Cuff Size: Adult Regular)   Pulse 64   Wt (!) 168.3 kg (371 lb)   BMI 7245.60 kg/m   Estimated body mass index is 7,245.6 kg/m  as calculated from the following:    Height as of 2/21/18: 0.152 m (6\").    Weight as of this encounter: 168.3 kg (371 lb).  Medication Reconciliation: complete    Previous A1C is not at goal of <8  Lab Results   Component Value Date    A1C 9.1 12/05/2018    A1C 7.8 05/15/2018     Urine microalbumin:creatine: n/a  Foot exam unknown-declines  Eye exam 04/2018    Tobacco User no  Patient is not on a daily aspirin  Patient is not on a Statin.  Blood pressure today of:     BP Readings from Last 1 Encounters:   01/15/19 136/78      is at the goal of <139/89 for diabetics.    Reanna Cedeno LPN on 1/15/2019 at 5:08 PM      "

## 2019-01-15 NOTE — PATIENT INSTRUCTIONS
Results for orders placed or performed during the hospital encounter of 12/05/18   XR Thoracic Spine G/E 4 Views    Narrative    XR CERV SPINE INCL FLEX/EXT G/E 4 VW, XR THORACIC SPINE G/E 4 VIEWS    HISTORY: ; Chronic neck and back pain; Chronic neck and back pain;  Chronic neck and back pain .    TECHNIQUE: 5 views of the cervical spine.    COMPARISON: None.    FINDINGS:    There is limited assessment before C5-6 due to shoulder artifact.  Scattered mild disc height loss, facet hypertrophy and uncovertebral  hypertrophy is present. There is slightly limited range of motion  flexion, potentially due to habitus.      Thoracic vertebral body heights and alignment are maintained. Thoracic  kyphosis is maintained. Scattered mild endplate degenerative changes  and slight disc height loss is seen throughout the thoracic spine.    No acute fracture is identified.      Impression    IMPRESSION:     Scattered, predominantly mild degenerative changes of the cervical and  thoracic spines.      JOSE ESTRADA MD      Orthopedic referral sent --[facility] - Dr. Frank Pelayo  - they will call with date/time of appointment.      MRIs ordered - [facility]  - they will call with date/time of appointment.      -- Try a Super-B-Complex with B12 -- every other day -- to help energy / mood and balance.     -- Consider Under the tongue / Liquid. ([employer])    Return for Diabetes labs and clinic follow-up appointment every 3 to 4 months.  --- (Go for about 91 to 100 days)    Aspects of Diabetes we can improve:  Hemoglobin A1c Lab Results   Component Value Date    A1C 9.1 12/05/2018    A1C 7.8 05/15/2018    Goal range is under 8. Best is 6.5 to 7   Blood Pressure 136/78 Goal to keep less than 140/90   Tobacco  reports that  has never smoked. he has never used smokeless tobacco. Goal to abstain from tobacco   Eye Exam -- Do Yearly -- Annual diabetic eye exam   Healthy weight Body mass index is 7,245.6 kg/m .  Goal BMI under 30, best is under 25.      -- Trying to exercise daily (goal at least 20 min/day) with moderate aerobic activity   -- Eat healthy (resources from ADA at http://www.diabetes.org/)   -- Taking good care of my feet. Consider seeing the Podiatrist   -- Check blood sugars as directed, record in log book and bring to every appointment      Schedule lab only appointment --- A few days AFTER: 4/15/19   Schedule clinic appointment with Dr. Johnston -- Same day as labs, or 1-2 days later.     Insurance companies are now grading you and I on your blood sugar control -- Goal is to get your A1c down to 7.9% or lower and NO Smoking!    -- Medicare and most insurance companies, will only cover Hemoglobin A1c labs to be rechecked every 91+ days.      Hemoglobin A1C   Date Value Ref Range Status   12/05/2018 9.1 (H) 4.0 - 6.0 % Final   05/15/2018 7.8 (H) 4.0 - 6.0 % Final       Next follow-up appointment with Dr. Johnston should be scheduled:  -- Approximately a few days AFTER: 4/15/19

## 2019-01-15 NOTE — PROGRESS NOTES
"Nursing Notes:   Reanna Cedeno LPN  1/15/2019  5:09 PM  Signed  Patient presents to the clinic for follow up with referrals.      Chief Complaint   Patient presents with     Clinic Care Coordination - Follow-up       Initial /78 (BP Location: Right arm, Patient Position: Sitting, Cuff Size: Adult Regular)   Pulse 64   Wt (!) 168.3 kg (371 lb)   BMI 7245.60 kg/m    Estimated body mass index is 7,245.6 kg/m  as calculated from the following:    Height as of 2/21/18: 0.152 m (6\").    Weight as of this encounter: 168.3 kg (371 lb).  Medication Reconciliation: complete    Previous A1C is not at goal of <8  Lab Results   Component Value Date    A1C 9.1 12/05/2018    A1C 7.8 05/15/2018     Urine microalbumin:creatine: n/a  Foot exam unknown-declines  Eye exam 04/2018    Tobacco User no  Patient is not on a daily aspirin  Patient is not on a Statin.  Blood pressure today of:     BP Readings from Last 1 Encounters:   01/15/19 136/78      is at the goal of <139/89 for diabetics.    Reanna Cedeno LPN on 1/15/2019 at 5:08 PM      Nursing note reviewed with patient.  Accuracy and completeness verified.   Mr. Puentes is a 48 year old male who:  Patient presents with:  Clinic Care Coordination - Follow-up      ICD-10-CM    1. Uncontrolled type 2 diabetes mellitus without complication, without long-term current use of insulin (H) E11.65    2. Chronic neck and back pain M54.2 ORTHO  REFERRAL    M54.9 MR Cervical Spine w/o Contrast    G89.29 MR Thoracic Spine w/o Contrast     MR Lumbar Spine w/o Contrast   3. Morbid obesity with BMI of 45.0-49.9, adult (H) E66.01     Z68.42    4. Spinal stenosis of lumbar region, unspecified whether neurogenic claudication present M48.061 MR Lumbar Spine w/o Contrast     HPI  Patient comes in for follow-up of multiple issues.    Uncontrolled type 2 diabetes, wife states that he got back onto the brand name glipizide and the medication is working like it is supposed to again. "  He got switched to a generic 1 and was having to take multiple tablets, multiple times per day with essentially no change in his blood sugar.  His blood sugars shot up significantly.    He had a few of the old tablets left and was able to find the old vendor, [employer] company, these are working well and his blood sugars are much better controlled.  Unfortunately his A1c was checked just over a month ago and is too early to recheck.    He needs [employer] only glipizide.    Chronic back and neck pain.  Has plans to see an orthopedic surgeon down in [facility].  Needs updated MRIs and orthopedic referral.  Orders placed.    Obesity, discussed need for reduced oral caloric intake.  Regular exercise.  Reduce carbohydrate intake.  Information printed and reviewed.    Lumbar spinal stenosis, has not had back surgery.  He would like to get an updated MRI.  Has had spinal injections in the past.    Functional Capacity: about 4 METS.   Review of Systems   Constitutional: Negative for chills, fatigue and fever.   HENT: Negative for congestion and hearing loss.    Eyes: Negative for pain and visual disturbance.   Respiratory: Negative for cough, shortness of breath and wheezing.    Cardiovascular: Negative for chest pain and palpitations.   Gastrointestinal: Negative for abdominal pain, diarrhea, nausea and vomiting.   Endocrine: Negative for cold intolerance and heat intolerance.   Genitourinary: Negative for dysuria and hematuria.   Musculoskeletal: Positive for arthralgias, back pain and gait problem. Negative for myalgias.   Skin: Negative for pallor.   Allergic/Immunologic: Negative for immunocompromised state.   Neurological: Positive for headaches. Negative for dizziness and light-headedness.   Hematological: Does not bruise/bleed easily.   Psychiatric/Behavioral: Negative for agitation and confusion.      ASHVIN:   ASHVIN-7 SCORE 12/5/2018 12/20/2018 1/15/2019   Total Score 0 0 0     PHQ9:  PHQ-9 SCORE 12/5/2018 12/20/2018  1/15/2019   PHQ-9 Total Score 0 0 0       I have personally reviewed the past medical history, past surgical history, medications, allergies, family and social history as listed below, on 1/15/2019.    Allergies   Allergen Reactions     Ace Inhibitors Other (See Comments)     - Declined     Hmg-Coa-R Inhibitors Other (See Comments)     -- declines at this time, re-address at next clinic appointment --     Metformin Other (See Comments)     Felt very foggy / groggy after taking, when in combination with Robaxin.        Current Outpatient Medications   Medication Sig Dispense Refill     blood glucose (NO BRAND SPECIFIED) lancets standard Dispense item covered by pt ins. E11.65 NIDDM type II, uncontrolled - Test 3 times/day, Reason: High A1C, new start Glipizide       blood glucose monitoring (ACCU-CHEK FASTCLIX) lancets USE TO TEST STRIPS THREE TIMES DAILY. 400 each 0     blood glucose monitoring (ACCU-CHEK SMARTVIEW) test strip Test blood sugar three times daily. Dx: E11.9 300 strip 0     Blood Glucose Monitoring Suppl (FIFTY50 GLUCOSE METER 2.0) W/DEVICE KIT Dispense item covered by pt ins. E11.65 NIDDM type II, uncontrolled - Test 3 time/day       celecoxib (CELEBREX) 200 MG capsule Take 1 capsule (200 mg) by mouth 2 times daily as needed for moderate pain 180 capsule 3     Cholecalciferol (VITAMIN D-3) 5000 units TABS Take 5,000 Units by mouth daily 100 tablet 3     citalopram (CELEXA) 20 MG tablet Take 1 tablet (20 mg) by mouth daily 90 tablet 3     glipiZIDE (GLUCOTROL) 5 MG tablet Take 1-2 tablets (5-10 mg) by mouth 3 times daily [employer] ONLY       MAPAP 500 MG tablet TAKE 2 TABLETS BY MOUTH EVERY 6 HOURS AS NEEDED. NO MORE THAN 4,000MG OF ACETAMINOPHEN DAILY 240 tablet 1     methocarbamol (ROBAXIN) 750 MG tablet TAKE 1 TABLET BY MOUTH THREE TIMES DAILY AS NEEDED FOR PAIN 90 tablet 0        Patient Active Problem List    Diagnosis Date Noted     Primary osteoarthritis involving multiple joints 12/05/2018      Priority: Medium     Chronic neck and back pain 12/05/2018     Priority: Medium     Arthritis of carpometacarpal (CMC) joints of both thumbs 08/17/2017     Priority: Medium     Uncontrolled type 2 diabetes mellitus without complication, without long-term current use of insulin (H) 07/27/2017     Priority: Medium     Lumbar spinal stenosis 05/01/2017     Priority: Medium     Anxiety and depression 11/01/2016     Priority: Medium     Chronic midline low back pain with left-sided sciatica 11/01/2016     Priority: Medium     Chronic pain of right knee 11/01/2016     Priority: Medium     Overview:   Updated per 10/1/17 IMO import  Overview:   Updated per 10/1/17 IMO import       Cyst of left kidney 11/01/2016     Priority: Medium     Mixed hyperlipidemia 11/01/2016     Priority: Medium     Morbid obesity with BMI of 45.0-49.9, adult (H) 11/01/2016     Priority: Medium     GWEN on CPAP - uses nightly, finds helpful. currently 15 cm H20 - will increase to 18 cm H20 5/15/2018 11/01/2016     Priority: Medium     Achilles tendonitis, bilateral 07/22/2016     Priority: Medium     Health care maintenance 07/01/2016     Priority: Medium     Overview:   Colonoscopy: Age 50  ITALO/PSA: Given family history of early prostate disease, would check periodically to monitor for changes; last in 08/2015, recheck in 1-3 years  AAA screen: Not indicated  Immunizations: UTD on other vaccines.   Lipids/Annual Exam: Done 07/2016, repeat as needed for cholesterol management  Hepatitis C screen: not indicated       Vitamin D deficiency 09/23/2015     Priority: Medium     Family hx of prostate cancer 08/17/2015     Priority: Medium     Past Medical History:   Diagnosis Date     Achilles tendinitis of left lower extremity     7/22/2016     Dorsalgia     No Comments Provided     Obstructive sleep apnea     using nasal pillows; sleeping 8 hours     Other specified anxiety disorders     No Comments Provided     Polyosteoarthritis     has had injections      Past Surgical History:   Procedure Laterality Date     TONSILLECTOMY      as a child     Social History     Socioeconomic History     Marital status:      Spouse name: None     Number of children: None     Years of education: None     Highest education level: None   Social Needs     Financial resource strain: None     Food insecurity - worry: None     Food insecurity - inability: None     Transportation needs - medical: None     Transportation needs - non-medical: None   Occupational History     None   Tobacco Use     Smoking status: Never Smoker     Smokeless tobacco: Never Used   Substance and Sexual Activity     Alcohol use: Yes     Alcohol/week: 0.0 oz     Comment: Alcoholic Drinks/day: rarely     Drug use: No     Sexual activity: Yes     Partners: Female   Other Topics Concern     Parent/sibling w/ CABG, MI or angioplasty before 65F 55M? Not Asked   Social History Narrative    Originally from [place]; moved back to [facility] in 2012 after living there for another 3-4 years with wife; , no children.  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  Going to school in [place] for [employer] certification.     Family History   Problem Relation Age of Onset     Other - See Comments Father         Psychiatric illness     Other - See Comments Paternal Grandfather         Psychiatric illness     Other - See Comments Paternal Uncle         Psychiatric illness       EXAM:   Vitals:    01/15/19 1658   BP: 136/78   BP Location: Right arm   Patient Position: Sitting   Cuff Size: Adult Regular   Pulse: 64   Weight: (!) 168.3 kg (371 lb)       Current Pain Score: Extreme Pain (9)     BP Readings from Last 3 Encounters:   01/15/19 136/78   12/20/18 134/78   12/05/18 138/74      Wt Readings from Last 3 Encounters:   01/15/19 (!) 168.3 kg (371 lb)   12/20/18 (!) 168.3 kg (371 lb)   12/05/18 (!) 168.3 kg (371 lb 2 oz)      Estimated body mass index is 7,245.6 kg/m  as calculated from the following:    Height as of  "2/21/18: 0.152 m (6\").    Weight as of this encounter: 168.3 kg (371 lb).     Physical Exam   Constitutional: He appears well-developed and well-nourished. No distress.   HENT:   Head: Normocephalic and atraumatic.   Eyes: Conjunctivae and EOM are normal. No scleral icterus.   Neck: No thyromegaly present.   Cardiovascular: Normal rate and regular rhythm.   Pulmonary/Chest: Effort normal. No respiratory distress. He has no wheezes.   Abdominal: Soft. There is no tenderness.   Musculoskeletal: He exhibits edema. He exhibits no tenderness or deformity.   Lymphadenopathy:     He has no cervical adenopathy.   Neurological: He is alert. No cranial nerve deficit.   Skin: Skin is warm and dry.   Psychiatric: He has a normal mood and affect.      Procedures   INVESTIGATIONS:  Results for orders placed or performed during the hospital encounter of 12/05/18   XR Thoracic Spine G/E 4 Views    Narrative    XR CERV SPINE INCL FLEX/EXT G/E 4 VW, XR THORACIC SPINE G/E 4 VIEWS    HISTORY: ; Chronic neck and back pain; Chronic neck and back pain;  Chronic neck and back pain .    TECHNIQUE: 5 views of the cervical spine.    COMPARISON: None.    FINDINGS:    There is limited assessment before C5-6 due to shoulder artifact.  Scattered mild disc height loss, facet hypertrophy and uncovertebral  hypertrophy is present. There is slightly limited range of motion  flexion, potentially due to habitus.      Thoracic vertebral body heights and alignment are maintained. Thoracic  kyphosis is maintained. Scattered mild endplate degenerative changes  and slight disc height loss is seen throughout the thoracic spine.    No acute fracture is identified.      Impression    IMPRESSION:     Scattered, predominantly mild degenerative changes of the cervical and  thoracic spines.      JOSE ESTRADA MD       ASSESSMENT AND PLAN:  Problem List Items Addressed This Visit        Nervous and Auditory    Chronic neck and back pain    Relevant Orders    " ORTHO  REFERRAL    MR Cervical Spine w/o Contrast    MR Thoracic Spine w/o Contrast    MR Lumbar Spine w/o Contrast       Digestive    Morbid obesity with BMI of 45.0-49.9, adult (H)    Relevant Medications    glipiZIDE (GLUCOTROL) 5 MG tablet       Endocrine    Uncontrolled type 2 diabetes mellitus without complication, without long-term current use of insulin (H) - Primary    Relevant Medications    glipiZIDE (GLUCOTROL) 5 MG tablet       Other    Lumbar spinal stenosis    Relevant Orders    MR Lumbar Spine w/o Contrast        reviewed diet, exercise and weight control, recommended sodium restriction  -- Expected clinical course discussed    -- Medications and their side effects discussed    The 10-year ASCVD risk score (Liam COLLADO Jr., et al., 2013) is: 11%    Values used to calculate the score:      Age: 48 years      Sex: Male      Is Non- : No      Diabetic: Yes      Tobacco smoker: No      Systolic Blood Pressure: 136 mmHg      Is BP treated: No      HDL Cholesterol: 37 mg/dL      Total Cholesterol: 243 mg/dL    Patient Instructions         Results for orders placed or performed during the hospital encounter of 12/05/18   XR Thoracic Spine G/E 4 Views    Narrative    XR CERV SPINE INCL FLEX/EXT G/E 4 VW, XR THORACIC SPINE G/E 4 VIEWS    HISTORY: ; Chronic neck and back pain; Chronic neck and back pain;  Chronic neck and back pain .    TECHNIQUE: 5 views of the cervical spine.    COMPARISON: None.    FINDINGS:    There is limited assessment before C5-6 due to shoulder artifact.  Scattered mild disc height loss, facet hypertrophy and uncovertebral  hypertrophy is present. There is slightly limited range of motion  flexion, potentially due to habitus.      Thoracic vertebral body heights and alignment are maintained. Thoracic  kyphosis is maintained. Scattered mild endplate degenerative changes  and slight disc height loss is seen throughout the thoracic spine.    No acute fracture  is identified.      Impression    IMPRESSION:     Scattered, predominantly mild degenerative changes of the cervical and  thoracic spines.      JOSE ESTRADA MD      Orthopedic referral sent --[facility] - Dr. Frank Pelayo  - they will call with date/time of appointment.      MRIs ordered - [facility]  - they will call with date/time of appointment.      -- Try a Super-B-Complex with B12 -- every other day -- to help energy / mood and balance.     -- Consider Under the tongue / Liquid. ([employer])    Return for Diabetes labs and clinic follow-up appointment every 3 to 4 months.  --- (Go for about 91 to 100 days)    Aspects of Diabetes we can improve:  Hemoglobin A1c Lab Results   Component Value Date    A1C 9.1 12/05/2018    A1C 7.8 05/15/2018    Goal range is under 8. Best is 6.5 to 7   Blood Pressure 136/78 Goal to keep less than 140/90   Tobacco  reports that  has never smoked. he has never used smokeless tobacco. Goal to abstain from tobacco   Eye Exam -- Do Yearly -- Annual diabetic eye exam   Healthy weight Body mass index is 7,245.6 kg/m . Goal BMI under 30, best is under 25.      -- Trying to exercise daily (goal at least 20 min/day) with moderate aerobic activity   -- Eat healthy (resources from ADA at http://www.diabetes.org/)   -- Taking good care of my feet. Consider seeing the Podiatrist   -- Check blood sugars as directed, record in log book and bring to every appointment      Schedule lab only appointment --- A few days AFTER: 4/15/19   Schedule clinic appointment with Dr. Johnston -- Same day as labs, or 1-2 days later.     Insurance companies are now grading you and I on your blood sugar control -- Goal is to get your A1c down to 7.9% or lower and NO Smoking!    -- Medicare and most insurance companies, will only cover Hemoglobin A1c labs to be rechecked every 91+ days.      Hemoglobin A1C   Date Value Ref Range Status   12/05/2018 9.1 (H) 4.0 - 6.0 % Final   05/15/2018  7.8 (H) 4.0 - 6.0 % Final       Next follow-up appointment with Dr. Johnston should be scheduled:  -- Approximately a few days AFTER: 4/15/19       Scott Johnston MD  Internal Medicine  [facility]     Portions of this note were dictated using speech recognition software. The note has been proofread but errors in the text may have been overlooked. Please contact me if there are any concerns regarding the accuracy of the dictation.

## 2019-01-16 ASSESSMENT — ANXIETY QUESTIONNAIRES: GAD7 TOTAL SCORE: 0

## 2019-01-18 ENCOUNTER — MYC MEDICAL ADVICE (OUTPATIENT)
Dept: INTERNAL MEDICINE | Facility: OTHER | Age: 49
End: 2019-01-18

## 2019-01-18 ENCOUNTER — TRANSFERRED RECORDS (OUTPATIENT)
Dept: HEALTH INFORMATION MANAGEMENT | Facility: OTHER | Age: 49
End: 2019-01-18

## 2019-01-18 DIAGNOSIS — F40.240 CLAUSTROPHOBIA: Primary | ICD-10-CM

## 2019-01-18 RX ORDER — ALPRAZOLAM 1 MG
1 TABLET ORAL DAILY PRN
Qty: 4 TABLET | Refills: 0 | Status: SHIPPED | OUTPATIENT
Start: 2019-01-18 | End: 2019-03-22

## 2019-01-18 NOTE — TELEPHONE ENCOUNTER
Spoke with patients wife, Skye. Verified patients full name and . Notified that script for new medication was faxed to [facility].     Rosa Franks LPN on 2019 at 12:02 PM

## 2019-01-18 NOTE — TELEPHONE ENCOUNTER
"Patient had 1 of 3 MRI's today and he is having a lot of anxiety.  Spouse indicates that he is shaking and sick to his stomach.  The tech working with him today didn't talk with him, didn't play any music, and didn't offer a towel to cover his face.    Patient has his next MRI on the 22nd and 24th.  Patient took his anxiety medications this am around 0630/0700 with no effect.  Spouse is requesting something \"good\" for patient now and to take prior to upcoming MRI's.      Reanna Cedeno LPN 1/18/2019 11:38 AM      "

## 2019-01-18 NOTE — TELEPHONE ENCOUNTER
1. Claustrophobia  START:   - ALPRAZolam (XANAX) 1 MG tablet; Take 1 tablet (1 mg) by mouth daily as needed for anxiety (x1 dose - prior to MRI)  Dispense: 4 tablet; Refill: 0    Scott Johnston MD

## 2019-01-22 ENCOUNTER — TRANSFERRED RECORDS (OUTPATIENT)
Dept: HEALTH INFORMATION MANAGEMENT | Facility: OTHER | Age: 49
End: 2019-01-22

## 2019-01-23 ENCOUNTER — TRANSFERRED RECORDS (OUTPATIENT)
Dept: HEALTH INFORMATION MANAGEMENT | Facility: OTHER | Age: 49
End: 2019-01-23

## 2019-01-31 NOTE — TELEPHONE ENCOUNTER
RECORDS RECEIVED FROM: Chronic neck and back pain [M54.2, M54.9, G89.29], MRI completed at [facility], pushing images, Previously seen at [facility] in 2016, Continued care at [facility] in [place], will have records faxed, scheduled per pt wife   DATE RECEIVED: 01/31/19    NOTES STATUS DETAILS   OFFICE NOTE from referring provider Received    OFFICE NOTE from other specialist N/A    DISCHARGE SUMMARY from hospital N/A    DISCHARGE REPORT from the ER N/A    OPERATIVE REPORT N/A    MEDICATION LIST Received    IMPLANT RECORD/STICKER N/A    LABS     CBC/DIFF N/A    CULTURES N/A    INJECTIONS DONE IN RADIOLOGY N/A    MRI Received 1/22/19 and earlier   CT SCAN N/A    XRAYS (IMAGES & REPORTS) Received 12/5/18 and earlier   TUMOR     PATHOLOGY  Slides & report N/A      01/31/19  Signed ERNESTO and imaging disc received from [facility]. [place]

## 2019-02-01 ASSESSMENT — ENCOUNTER SYMPTOMS
DIARRHEA: 0
ABDOMINAL PAIN: 0
BOWEL INCONTINENCE: 0
EYE REDNESS: 0
WEIGHT LOSS: 0
SINUS PAIN: 1
HEMATURIA: 0
JAUNDICE: 0
NAUSEA: 1
SKIN CHANGES: 1
CHILLS: 0
BACK PAIN: 1
SYNCOPE: 0
EYE IRRITATION: 1
ALTERED TEMPERATURE REGULATION: 1
SMELL DISTURBANCE: 0
SPEECH CHANGE: 0
JOINT SWELLING: 1
PARALYSIS: 0
NIGHT SWEATS: 0
LOSS OF CONSCIOUSNESS: 0
FATIGUE: 1
MUSCLE WEAKNESS: 1
STIFFNESS: 1
MUSCLE CRAMPS: 1
MYALGIAS: 1
NECK PAIN: 1
BLOATING: 1
LIGHT-HEADEDNESS: 0
TASTE DISTURBANCE: 0
DYSURIA: 1
SLEEP DISTURBANCES DUE TO BREATHING: 0
MEMORY LOSS: 1
NUMBNESS: 1
HYPERTENSION: 0
HYPOTENSION: 0
HEADACHES: 1
RECTAL PAIN: 0
DIFFICULTY URINATING: 0
SINUS CONGESTION: 1
EYE PAIN: 1
TINGLING: 1
VOMITING: 0
DISTURBANCES IN COORDINATION: 0
LEG PAIN: 1
SORE THROAT: 1
NECK MASS: 0
SEIZURES: 0
TREMORS: 1
DECREASED CONCENTRATION: 1
INSOMNIA: 1
DIZZINESS: 1
TROUBLE SWALLOWING: 1
DEPRESSION: 1
HEARTBURN: 1
DOUBLE VISION: 1
INCREASED ENERGY: 1
PALPITATIONS: 1
ARTHRALGIAS: 1
NERVOUS/ANXIOUS: 1
FEVER: 0
EYE WATERING: 1
CONSTIPATION: 1
EXERCISE INTOLERANCE: 0
HALLUCINATIONS: 0
HOARSE VOICE: 0
WEAKNESS: 1
FLANK PAIN: 0
BLOOD IN STOOL: 0
PANIC: 1
NAIL CHANGES: 1
POLYPHAGIA: 1
POLYDIPSIA: 1
WEIGHT GAIN: 1
DECREASED APPETITE: 0
POOR WOUND HEALING: 0
ORTHOPNEA: 1

## 2019-02-06 DIAGNOSIS — G89.29 OTHER CHRONIC PAIN: ICD-10-CM

## 2019-02-07 RX ORDER — METHOCARBAMOL 750 MG/1
TABLET, FILM COATED ORAL
Qty: 90 TABLET | Refills: 0 | OUTPATIENT
Start: 2019-02-07

## 2019-02-07 RX ORDER — METHOCARBAMOL 750 MG/1
TABLET, FILM COATED ORAL
Qty: 90 TABLET | Refills: 3 | Status: SHIPPED | OUTPATIENT
Start: 2019-02-07 | End: 2019-06-10

## 2019-02-07 NOTE — TELEPHONE ENCOUNTER
Routing refill request to provider for review/approval because:  Drug not on the [facility] refill protocol     Last filled on 1-8-19 for #90 X 0 refills. Has upcoming visit scheduled on 3/8/19.    Maxine Beltran RN on 2/7/2019 at 3:31 PM
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
## 2019-02-13 DIAGNOSIS — M48.061 SPINAL STENOSIS OF LUMBAR REGION, UNSPECIFIED WHETHER NEUROGENIC CLAUDICATION PRESENT: Primary | ICD-10-CM

## 2019-02-14 ENCOUNTER — TRANSFERRED RECORDS (OUTPATIENT)
Dept: HEALTH INFORMATION MANAGEMENT | Facility: OTHER | Age: 49
End: 2019-02-14

## 2019-02-14 ENCOUNTER — PRE VISIT (OUTPATIENT)
Dept: ORTHOPEDICS | Facility: CLINIC | Age: 49
End: 2019-02-14

## 2019-02-14 ENCOUNTER — ANCILLARY PROCEDURE (OUTPATIENT)
Dept: GENERAL RADIOLOGY | Facility: CLINIC | Age: 49
End: 2019-02-14
Attending: ORTHOPAEDIC SURGERY
Payer: COMMERCIAL

## 2019-02-14 ENCOUNTER — OFFICE VISIT (OUTPATIENT)
Dept: ORTHOPEDICS | Facility: CLINIC | Age: 49
End: 2019-02-14
Payer: COMMERCIAL

## 2019-02-14 VITALS — WEIGHT: 315 LBS | BODY MASS INDEX: 42.66 KG/M2 | HEIGHT: 72 IN

## 2019-02-14 DIAGNOSIS — M48.061 SPINAL STENOSIS OF LUMBAR REGION, UNSPECIFIED WHETHER NEUROGENIC CLAUDICATION PRESENT: ICD-10-CM

## 2019-02-14 DIAGNOSIS — M48.062 LUMBAR STENOSIS WITH NEUROGENIC CLAUDICATION: Primary | ICD-10-CM

## 2019-02-14 DIAGNOSIS — M47.812 SPONDYLOSIS OF CERVICAL REGION WITHOUT MYELOPATHY OR RADICULOPATHY: ICD-10-CM

## 2019-02-14 ASSESSMENT — MIFFLIN-ST. JEOR: SCORE: 2608.99
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
## 2019-02-20 ENCOUNTER — MYC MEDICAL ADVICE (OUTPATIENT)
Dept: INTERNAL MEDICINE | Facility: OTHER | Age: 49
End: 2019-02-20

## 2019-02-22 ENCOUNTER — MYC MEDICAL ADVICE (OUTPATIENT)
Dept: INTERNAL MEDICINE | Facility: OTHER | Age: 49
End: 2019-02-22

## 2019-02-22 NOTE — TELEPHONE ENCOUNTER
Outside providers made these orders... Not sure how much [facility] will be doing with these requests.     I believe his imaging was done at outside hospital, and I don't think we have access to those pictures... Maybe just the reports.     Scott Johnston MD

## 2019-02-25 NOTE — TELEPHONE ENCOUNTER
All we have are xray results.   No MRI results.     Need to check with the referral people about the pain clinic. That was outside provider order/request.     Scott Johnston MD      2/14/2019: Exam: XR SPINE COMPLETE SCOLIOSIS 2 VW, 2/14/2019 2:21 PM     Indication: Spinal stenosis of lumbar region, unspecified whether  neurogenic claudication present     Comparison: 1/22/2019 MRI      Techniques: AP and lateral EOS composite images of the full spine were  submitted for interpretation.     Findings:     12 rib bearing vertebral bodies and 5 lumbar type vertebral bodies are  identified. Multilevel degenerative endplate change in the lower  cervical spine, throughout the thoracic spine, and throughout the  lumbar spine. No significant spondylolisthesis. No vertebral body  height loss.     Coronal Deformity:     No significant abnormal coronal curvature.      No substantial global coronal imbalance.     Sagittal Vertical Axis (A vertical line drawn from the center of C7  (dayanna line) to the posterosuperior aspect of the S1 on sagittal  plane):  less than 4 cm      Additional Findings:   Clear lungs. Nonobstructive bowel gas pattern.                                                                      Impression:  No global coronal or sagittal imbalance.     ZE ANTON, DO

## 2019-02-27 ENCOUNTER — TELEPHONE (OUTPATIENT)
Dept: INTERNAL MEDICINE | Facility: OTHER | Age: 49
End: 2019-02-27

## 2019-02-27 DIAGNOSIS — M15.0 PRIMARY OSTEOARTHRITIS INVOLVING MULTIPLE JOINTS: ICD-10-CM

## 2019-02-27 DIAGNOSIS — G89.29 CHRONIC PAIN OF RIGHT KNEE: ICD-10-CM

## 2019-02-27 DIAGNOSIS — M25.561 CHRONIC PAIN OF RIGHT KNEE: ICD-10-CM

## 2019-02-27 DIAGNOSIS — M48.061 SPINAL STENOSIS OF LUMBAR REGION, UNSPECIFIED WHETHER NEUROGENIC CLAUDICATION PRESENT: Primary | ICD-10-CM

## 2019-02-27 DIAGNOSIS — G89.29 CHRONIC MIDLINE LOW BACK PAIN WITH LEFT-SIDED SCIATICA: ICD-10-CM

## 2019-02-27 DIAGNOSIS — M54.42 CHRONIC MIDLINE LOW BACK PAIN WITH LEFT-SIDED SCIATICA: ICD-10-CM

## 2019-02-27 DIAGNOSIS — M54.2 CHRONIC NECK AND BACK PAIN: ICD-10-CM

## 2019-02-27 DIAGNOSIS — M18.0 ARTHRITIS OF CARPOMETACARPAL (CMC) JOINTS OF BOTH THUMBS: ICD-10-CM

## 2019-02-27 DIAGNOSIS — E66.01 MORBID OBESITY WITH BMI OF 45.0-49.9, ADULT (H): ICD-10-CM

## 2019-02-27 DIAGNOSIS — G89.29 CHRONIC NECK AND BACK PAIN: ICD-10-CM

## 2019-02-27 DIAGNOSIS — M54.9 CHRONIC NECK AND BACK PAIN: ICD-10-CM

## 2019-02-27 NOTE — TELEPHONE ENCOUNTER
[facility] needs Scott Johnston MD to send the referral for Pain Management, patient wants to go to [place] not [facility].      Reanna Cedeno LPN 2/27/2019 11:56 AM

## 2019-02-28 ENCOUNTER — TELEPHONE (OUTPATIENT)
Dept: ORTHOPEDICS | Facility: CLINIC | Age: 49
End: 2019-02-28

## 2019-02-28 NOTE — TELEPHONE ENCOUNTER
[facility] called with questions about referral.  Patient's wife has been persistently calling them regarding the referral [facility]  They were aware that we'd been notified that a referral from Dr. Johnston was needed.  He placed one but it only says [facility] and [facility] is requesting one that specifically states [facility] on the order.  Would Dr. Johnston be will to place a new order?  After that is done [facility] will go ahead and make the appointment with them.  Thank you.  Sasha Davis on 2/28/2019 at 2:52 PM

## 2019-02-28 NOTE — TELEPHONE ENCOUNTER
Health Call Center    Phone Message    May a detailed message be left on voicemail: yes    Reason for Call: Other: Patient's wife Skye called requesting an updated order to a new pain clinic. Skye stated that Dr. Pelayo referred patient to our pain clinic but it's too far for patient. Skye wondering if a new referral could be placed to the pain clinic through [facility] in [place]. Please fax referral to [phone], Attention: Karen or Betzy. [facility]'s pain clinic phone number is [phone] if needed. Please call Skye when referral is sent or with any questions.     Action Taken: Message routed to:  [facility] ([facility] Orthopedic

## 2019-03-01 ENCOUNTER — MYC MEDICAL ADVICE (OUTPATIENT)
Dept: INTERNAL MEDICINE | Facility: OTHER | Age: 49
End: 2019-03-01

## 2019-03-01 DIAGNOSIS — M19.90 OSTEOARTHRITIS: Primary | ICD-10-CM

## 2019-03-08 ENCOUNTER — MYC MEDICAL ADVICE (OUTPATIENT)
Dept: INTERNAL MEDICINE | Facility: OTHER | Age: 49
End: 2019-03-08

## 2019-03-10 DIAGNOSIS — G89.29 OTHER CHRONIC PAIN: ICD-10-CM

## 2019-03-11 RX ORDER — PSEUDOEPHED/ACETAMINOPH/DIPHEN 30MG-500MG
TABLET ORAL
Qty: 240 TABLET | Refills: 1 | Status: SHIPPED | OUTPATIENT
Start: 2019-03-11 | End: 2019-05-12

## 2019-03-11 NOTE — TELEPHONE ENCOUNTER
Prescription approved per [facility] Refill Protocol.  LOV: 1/15/19  Patient to follow up around 4/15/19  Ananya Mckinley RN on 3/11/2019 at 2:11 PM

## 2019-03-22 ENCOUNTER — OFFICE VISIT (OUTPATIENT)
Dept: INTERNAL MEDICINE | Facility: OTHER | Age: 49
End: 2019-03-22
Attending: INTERNAL MEDICINE
Payer: COMMERCIAL

## 2019-03-22 VITALS
RESPIRATION RATE: 16 BRPM | BODY MASS INDEX: 50.86 KG/M2 | HEART RATE: 100 BPM | WEIGHT: 315 LBS | TEMPERATURE: 98.3 F | SYSTOLIC BLOOD PRESSURE: 132 MMHG | DIASTOLIC BLOOD PRESSURE: 86 MMHG

## 2019-03-22 DIAGNOSIS — G47.33 OSA ON CPAP: ICD-10-CM

## 2019-03-22 DIAGNOSIS — M15.0 PRIMARY OSTEOARTHRITIS INVOLVING MULTIPLE JOINTS: ICD-10-CM

## 2019-03-22 DIAGNOSIS — E66.01 MORBID OBESITY WITH BMI OF 50.0-59.9, ADULT (H): ICD-10-CM

## 2019-03-22 DIAGNOSIS — E78.2 MIXED HYPERLIPIDEMIA: ICD-10-CM

## 2019-03-22 DIAGNOSIS — F32.A ANXIETY AND DEPRESSION: ICD-10-CM

## 2019-03-22 DIAGNOSIS — F41.9 ANXIETY AND DEPRESSION: ICD-10-CM

## 2019-03-22 LAB
ALBUMIN SERPL-MCNC: 4.3 G/DL (ref 3.5–5.7)
ALBUMIN UR-MCNC: NEGATIVE MG/DL
ALP SERPL-CCNC: 41 U/L (ref 34–104)
ALT SERPL W P-5'-P-CCNC: 35 U/L (ref 7–52)
ANION GAP SERPL CALCULATED.3IONS-SCNC: 6 MMOL/L (ref 3–14)
APPEARANCE UR: CLEAR
AST SERPL W P-5'-P-CCNC: 26 U/L (ref 13–39)
BILIRUB SERPL-MCNC: 1.2 MG/DL (ref 0.3–1)
BILIRUB UR QL STRIP: NEGATIVE
BUN SERPL-MCNC: 17 MG/DL (ref 7–25)
CALCIUM SERPL-MCNC: 9.7 MG/DL (ref 8.6–10.3)
CHLORIDE SERPL-SCNC: 101 MMOL/L (ref 98–107)
CHOLEST SERPL-MCNC: 240 MG/DL
CO2 SERPL-SCNC: 31 MMOL/L (ref 21–31)
COLOR UR AUTO: YELLOW
CREAT SERPL-MCNC: 1.05 MG/DL (ref 0.7–1.3)
CREAT UR-MCNC: 213 MG/DL
ERYTHROCYTE [DISTWIDTH] IN BLOOD BY AUTOMATED COUNT: 13.9 % (ref 10–15)
GFR SERPL CREATININE-BSD FRML MDRD: 75 ML/MIN/{1.73_M2}
GLUCOSE SERPL-MCNC: 248 MG/DL (ref 70–105)
GLUCOSE UR STRIP-MCNC: 250 MG/DL
HBA1C MFR BLD: 9.1 % (ref 4–6)
HCT VFR BLD AUTO: 52.3 % (ref 40–53)
HDLC SERPL-MCNC: 36 MG/DL (ref 23–92)
HGB BLD-MCNC: 16.9 G/DL (ref 13.3–17.7)
HGB UR QL STRIP: NEGATIVE
KETONES UR STRIP-MCNC: NEGATIVE MG/DL
LDLC SERPL CALC-MCNC: 139 MG/DL
LEUKOCYTE ESTERASE UR QL STRIP: NEGATIVE
MCH RBC QN AUTO: 26.6 PG (ref 26.5–33)
MCHC RBC AUTO-ENTMCNC: 32.3 G/DL (ref 31.5–36.5)
MCV RBC AUTO: 82 FL (ref 78–100)
MICROALBUMIN UR-MCNC: 10 MG/L
MICROALBUMIN/CREAT UR: 4.88 MG/G CR (ref 0–17)
NITRATE UR QL: NEGATIVE
NONHDLC SERPL-MCNC: 204 MG/DL
PH UR STRIP: 5.5 PH (ref 5–9)
PLATELET # BLD AUTO: 163 10E9/L (ref 150–450)
POTASSIUM SERPL-SCNC: 4.3 MMOL/L (ref 3.5–5.1)
PROT SERPL-MCNC: 7.4 G/DL (ref 6.4–8.9)
RBC # BLD AUTO: 6.35 10E12/L (ref 4.4–5.9)
RBC #/AREA URNS AUTO: NORMAL /HPF
SODIUM SERPL-SCNC: 138 MMOL/L (ref 134–144)
SOURCE: ABNORMAL
SP GR UR STRIP: >1.03 (ref 1–1.03)
TRIGL SERPL-MCNC: 324 MG/DL
UROBILINOGEN UR STRIP-ACNC: 0.2 EU/DL (ref 0.2–1)
WBC # BLD AUTO: 5.8 10E9/L (ref 4–11)
WBC #/AREA URNS AUTO: NORMAL /HPF

## 2019-03-22 PROCEDURE — 80053 COMPREHEN METABOLIC PANEL: CPT | Performed by: INTERNAL MEDICINE

## 2019-03-22 PROCEDURE — 83036 HEMOGLOBIN GLYCOSYLATED A1C: CPT | Performed by: INTERNAL MEDICINE

## 2019-03-22 PROCEDURE — 82043 UR ALBUMIN QUANTITATIVE: CPT | Performed by: INTERNAL MEDICINE

## 2019-03-22 PROCEDURE — 99214 OFFICE O/P EST MOD 30 MIN: CPT | Performed by: INTERNAL MEDICINE

## 2019-03-22 PROCEDURE — 81001 URINALYSIS AUTO W/SCOPE: CPT | Performed by: INTERNAL MEDICINE

## 2019-03-22 PROCEDURE — 80061 LIPID PANEL: CPT | Performed by: INTERNAL MEDICINE

## 2019-03-22 PROCEDURE — G0463 HOSPITAL OUTPT CLINIC VISIT: HCPCS

## 2019-03-22 PROCEDURE — 36415 COLL VENOUS BLD VENIPUNCTURE: CPT | Performed by: INTERNAL MEDICINE

## 2019-03-22 PROCEDURE — 85027 COMPLETE CBC AUTOMATED: CPT | Performed by: INTERNAL MEDICINE

## 2019-03-22 RX ORDER — CITALOPRAM HYDROBROMIDE 20 MG/1
60 TABLET ORAL DAILY
Qty: 90 TABLET | Refills: 11 | Status: SHIPPED | OUTPATIENT
Start: 2019-03-22 | End: 2020-04-13

## 2019-03-22 RX ORDER — ALPRAZOLAM 1 MG
1 TABLET ORAL 3 TIMES DAILY PRN
Status: CANCELLED | OUTPATIENT
Start: 2019-03-22

## 2019-03-22 ASSESSMENT — ENCOUNTER SYMPTOMS
FEVER: 0
ABDOMINAL PAIN: 0
CONFUSION: 0
CHILLS: 0
WHEEZING: 0
LIGHT-HEADEDNESS: 0
COUGH: 0
PALPITATIONS: 0
FATIGUE: 0
DIARRHEA: 0
AGITATION: 0
NAUSEA: 0
DYSURIA: 0
HEMATURIA: 0
SHORTNESS OF BREATH: 0
BACK PAIN: 1
ARTHRALGIAS: 1
DIZZINESS: 0
HEADACHES: 1
MYALGIAS: 0
ROS GI COMMENTS: ABDOMINAL OBESITY
EYE PAIN: 0
VOMITING: 0
BRUISES/BLEEDS EASILY: 0

## 2019-03-22 ASSESSMENT — ANXIETY QUESTIONNAIRES
2. NOT BEING ABLE TO STOP OR CONTROL WORRYING: NOT AT ALL
6. BECOMING EASILY ANNOYED OR IRRITABLE: NOT AT ALL
GAD7 TOTAL SCORE: 0
5. BEING SO RESTLESS THAT IT IS HARD TO SIT STILL: NOT AT ALL
1. FEELING NERVOUS, ANXIOUS, OR ON EDGE: NOT AT ALL
IF YOU CHECKED OFF ANY PROBLEMS ON THIS QUESTIONNAIRE, HOW DIFFICULT HAVE THESE PROBLEMS MADE IT FOR YOU TO DO YOUR WORK, TAKE CARE OF THINGS AT HOME, OR GET ALONG WITH OTHER PEOPLE: NOT DIFFICULT AT ALL
3. WORRYING TOO MUCH ABOUT DIFFERENT THINGS: NOT AT ALL
7. FEELING AFRAID AS IF SOMETHING AWFUL MIGHT HAPPEN: NOT AT ALL

## 2019-03-22 ASSESSMENT — PATIENT HEALTH QUESTIONNAIRE - PHQ9
5. POOR APPETITE OR OVEREATING: NOT AT ALL
SUM OF ALL RESPONSES TO PHQ QUESTIONS 1-9: 0

## 2019-03-22 ASSESSMENT — PAIN SCALES - GENERAL: PAINLEVEL: MODERATE PAIN (5)

## 2019-03-22 NOTE — NURSING NOTE
Patient presents to the clinic for diabetes management.    Previous A1C is at goal of <8  Lab Results   Component Value Date    A1C 9.1 12/05/2018    A1C 7.8 05/15/2018     Urine microalbumin:creatine: n/a  Foot exam unknown-declines today  Eye exam 04/2018    Tobacco User no  Patient is not on a daily aspirin  Patient is not on a Statin.  Blood pressure today of:     BP Readings from Last 1 Encounters:   01/15/19 136/78      is at the goal of <139/89 for diabetics.    Chief Complaint   Patient presents with     Diabetes       Initial /86 (BP Location: Right arm, Patient Position: Sitting, Cuff Size: Adult Regular)   Pulse 100   Temp 98.3  F (36.8  C) (Tympanic)   Resp 16   Wt (!) 170.1 kg (375 lb)   BMI 50.86 kg/m   Estimated body mass index is 50.86 kg/m  as calculated from the following:    Height as of 2/14/19: 1.829 m (6').    Weight as of this encounter: 170.1 kg (375 lb).  Medication Reconciliation: complete    Reanna Cedeno LPN

## 2019-03-22 NOTE — LETTER
March 25, 2019    Shelton Puentes  [address]  [facility] [address]      Dear Shelton Puentes,    The result of your recent tests are included below:    Hemoglobin A1c is better but still uncontrolled.    Cholesterol levels are very high.    Results for orders placed or performed in visit on 03/22/19   Hemoglobin A1c   Result Value Ref Range    Hemoglobin A1C 9.1 (H) 4.0 - 6.0 %   Lipid Profile   Result Value Ref Range    Cholesterol 240 (H) <200 mg/dL    Triglycerides 324 (H) <150 mg/dL    HDL Cholesterol 36 23 - 92 mg/dL    LDL Cholesterol Calculated 139 (H) <100 mg/dL    Non HDL Cholesterol 204 (H) <130 mg/dL   CBC with platelets   Result Value Ref Range    WBC 5.8 4.0 - 11.0 10e9/L    RBC Count 6.35 (H) 4.4 - 5.9 10e12/L    Hemoglobin 16.9 13.3 - 17.7 g/dL    Hematocrit 52.3 40.0 - 53.0 %    MCV 82 78 - 100 fl    MCH 26.6 26.5 - 33.0 pg    MCHC 32.3 31.5 - 36.5 g/dL    RDW 13.9 10.0 - 15.0 %    Platelet Count 163 150 - 450 10e9/L   Comprehensive metabolic panel   Result Value Ref Range    Sodium 138 134 - 144 mmol/L    Potassium 4.3 3.5 - 5.1 mmol/L    Chloride 101 98 - 107 mmol/L    Carbon Dioxide 31 21 - 31 mmol/L    Anion Gap 6 3 - 14 mmol/L    Glucose 248 (H) 70 - 105 mg/dL    Urea Nitrogen 17 7 - 25 mg/dL    Creatinine 1.05 0.70 - 1.30 mg/dL    GFR Estimate 75 >60 mL/min/[1.73_m2]    GFR Estimate If Black >90 >60 mL/min/[1.73_m2]    Calcium 9.7 8.6 - 10.3 mg/dL    Bilirubin Total 1.2 (H) 0.3 - 1.0 mg/dL    Albumin 4.3 3.5 - 5.7 g/dL    Protein Total 7.4 6.4 - 8.9 g/dL    Alkaline Phosphatase 41 34 - 104 U/L    ALT 35 7 - 52 U/L    AST 26 13 - 39 U/L   *UA reflex to Microscopic   Result Value Ref Range    Color Urine Yellow     Appearance Urine Clear     Glucose Urine 250 (A) NEG^Negative mg/dL    Bilirubin Urine Negative NEG^Negative    Ketones Urine Negative NEG^Negative mg/dL    Specific Gravity Urine >1.030 (H) 1.000 - 1.030    Blood Urine Negative NEG^Negative    pH Urine 5.5 5.0 -  9.0 pH    Protein Albumin Urine Negative NEG^Negative mg/dL    Urobilinogen Urine 0.2 0.2 - 1.0 EU/dL    Nitrite Urine Negative NEG^Negative    Leukocyte Esterase Urine Negative NEG^Negative    Source Unspecified Urine    Albumin Random Urine Quantitative with Creat Ratio   Result Value Ref Range    Creatinine Urine 213 mg/dL    Albumin Urine mg/L 10 mg/L    Albumin Urine mg/g Cr 4.88 0 - 17 mg/g Cr   Urine Microscopic   Result Value Ref Range    WBC Urine 0 - 5 OTO5^0 - 5 /HPF    RBC Urine O - 2 OTO2^O - 2 /HPF       If you have any further questions or problems, please contact my office at [phone] and schedule an appointment.    Clinic : [phone]  Appointment line: [phone]     Thank you,    Scott Johnston MD    Internal Medicine  [facility]     Reviewed and electronically signed by provider.

## 2019-03-22 NOTE — PROGRESS NOTES
Nursing Notes:   Reanna Cedeno LPN  3/22/2019 10:17 AM  Signed  Patient presents to the clinic for diabetes management.    Previous A1C is at goal of <8  Lab Results   Component Value Date    A1C 9.1 12/05/2018    A1C 7.8 05/15/2018     Urine microalbumin:creatine: n/a  Foot exam unknown-declines today  Eye exam 04/2018    Tobacco User no  Patient is not on a daily aspirin  Patient is not on a Statin.  Blood pressure today of:     BP Readings from Last 1 Encounters:   01/15/19 136/78      is at the goal of <139/89 for diabetics.    Chief Complaint   Patient presents with     Diabetes       Initial /86 (BP Location: Right arm, Patient Position: Sitting, Cuff Size: Adult Regular)   Pulse 100   Temp 98.3  F (36.8  C) (Tympanic)   Resp 16   Wt (!) 170.1 kg (375 lb)   BMI 50.86 kg/m    Estimated body mass index is 50.86 kg/m  as calculated from the following:    Height as of 2/14/19: 1.829 m (6').    Weight as of this encounter: 170.1 kg (375 lb).  Medication Reconciliation: complete    Reanna Cedeno LPN          Nursing note reviewed with patient.  Accuracy and completeness verified.   Mr. Puentes is a 48 year old male who:  Patient presents with:  Diabetes      ICD-10-CM    1. Uncontrolled type 2 diabetes mellitus without complication, without long-term current use of insulin (H) E11.65 dulaglutide (TRULICITY) 0.75 MG/0.5ML pen     dulaglutide (TRULICITY) 1.5 MG/0.5ML pen     DIABETES EDUCATOR REFERRAL     Hemoglobin A1c     CBC with platelets     Comprehensive metabolic panel     *UA reflex to Microscopic     Albumin Random Urine Quantitative with Creat Ratio     Urine Microscopic   2. Mixed hyperlipidemia E78.2 Lipid Profile   3. Primary osteoarthritis involving multiple joints M15.0    4. Morbid obesity with BMI of 50.0-59.9, adult (H) E66.01     Z68.43    5. Anxiety and depression F41.9 citalopram (CELEXA) 20 MG tablet    F32.9    6. GWEN on CPAP - uses nightly, finds helpful. Has been beneficial.  currently 15 cm H20 - will increase to 18 cm H20 5/15/2018 G47.33     Z99.89      HPI  Uncontrolled type 2 diabetes.  Blood sugar this morning was 174.  When he checks his sugar intermittently he has had numbers up in the 240 range.    He was on once weekly injectables in the past, we will look into the possibility of Trulicity coverage.  Prescription sent to pharmacy.  Diabetic education referral also sent.  He needs better control of his blood sugars.    Mixed hyperlipidemia, diet-controlled.  Declines statins.    Primary osteoarthritis, multiple joints.  Thumb pain is much improved after surgery.    Chronic back pain, following with [facility] for this.  Has had some injections in the past but they are not working anymore.    Morbid obesity, BMI previously less than 50, now greater than 50.    Chronic anxiety, now taking 60 mg of Celexa daily.  Needs refills.    Functional Capacity: about 4 METS.   Review of Systems   Constitutional: Negative for chills, fatigue and fever.   HENT: Negative for congestion and hearing loss.    Eyes: Negative for pain and visual disturbance.   Respiratory: Negative for cough, shortness of breath and wheezing.    Cardiovascular: Negative for chest pain and palpitations.   Gastrointestinal: Negative for abdominal pain, diarrhea, nausea and vomiting.        Abdominal obesity   Endocrine: Negative for cold intolerance and heat intolerance.   Genitourinary: Negative for dysuria and hematuria.   Musculoskeletal: Positive for arthralgias, back pain and gait problem. Negative for myalgias.   Skin: Negative for pallor.   Allergic/Immunologic: Negative for immunocompromised state.   Neurological: Positive for headaches. Negative for dizziness and light-headedness.   Hematological: Does not bruise/bleed easily.   Psychiatric/Behavioral: Negative for agitation and confusion.        ASHVIN:   ASHVIN-7 SCORE 12/20/2018 1/15/2019 3/22/2019   Total Score 0 0 0     PHQ9:  PHQ-9 SCORE 12/20/2018 1/15/2019  3/22/2019   PHQ-9 Total Score 0 0 0       I have personally reviewed the past medical history, past surgical history, medications, allergies, family and social history as listed below.     Allergies   Allergen Reactions     Ace Inhibitors Other (See Comments)     - Declined     Aspirin Other (See Comments)     ---- Declines     Hmg-Coa-R Inhibitors Other (See Comments)     -- declines at this time, re-address at next clinic appointment --     Metformin Other (See Comments)     Felt very foggy / groggy after taking, when in combination with Robaxin.        Current Outpatient Medications   Medication Sig Dispense Refill     ACETAMINOPHEN EXTRA STRENGTH 500 MG tablet TAKE 2 TABLETS BY MOUTH EVERY 6 HOURS AS NEEDED. NO MORE THAN 4,000MG OF ACETAMINOPHEN DAILY 240 tablet 1     blood glucose (NO BRAND SPECIFIED) lancets standard Dispense item covered by pt ins. E11.65 NIDDM type II, uncontrolled - Test 3 times/day, Reason: High A1C, new start Glipizide       blood glucose monitoring (ACCU-CHEK FASTCLIX) lancets USE TO TEST STRIPS THREE TIMES DAILY. 400 each 0     blood glucose monitoring (ACCU-CHEK SMARTVIEW) test strip Test blood sugar three times daily. Dx: E11.9 300 strip 0     Blood Glucose Monitoring Suppl (FIFTY50 GLUCOSE METER 2.0) W/DEVICE KIT Dispense item covered by pt ins. E11.65 NIDDM type II, uncontrolled - Test 3 time/day       celecoxib (CELEBREX) 200 MG capsule Take 1 capsule (200 mg) by mouth 2 times daily as needed for moderate pain 180 capsule 3     Cholecalciferol (VITAMIN D-3) 5000 units TABS Take 5,000 Units by mouth daily 100 tablet 3     citalopram (CELEXA) 20 MG tablet Take 3 tablets (60 mg) by mouth daily 90 tablet 11     dulaglutide (TRULICITY) 0.75 MG/0.5ML pen Inject 0.75 mg Subcutaneous every 7 days -- x4 weeks, then increase dose 2 mL 0     [START ON 4/19/2019] dulaglutide (TRULICITY) 1.5 MG/0.5ML pen Inject 1.5 mg Subcutaneous every 7 days - start 4/19/2019 (after done with lower dose) 2 mL  11     glipiZIDE (GLUCOTROL) 5 MG tablet Take 1-2 tablets (5-10 mg) by mouth 3 times daily APOTEX ONLY       methocarbamol (ROBAXIN) 750 MG tablet TAKE 1 TABLET BY MOUTH THREE TIMES DAILY AS NEEDED FOR PAIN 90 tablet 3        Patient Active Problem List    Diagnosis Date Noted     Primary osteoarthritis involving multiple joints 12/05/2018     Priority: Medium     Chronic neck and back pain 12/05/2018     Priority: Medium     Arthritis of carpometacarpal (CMC) joints of both thumbs 08/17/2017     Priority: Medium     Uncontrolled type 2 diabetes mellitus without complication, without long-term current use of insulin (H) 07/27/2017     Priority: Medium     Lumbar spinal stenosis 05/01/2017     Priority: Medium     Anxiety and depression 11/01/2016     Priority: Medium     Chronic midline low back pain with left-sided sciatica 11/01/2016     Priority: Medium     Chronic pain of right knee 11/01/2016     Priority: Medium     Overview:   Updated per 10/1/17 IMO import  Overview:   Updated per 10/1/17 IMO import       Cyst of left kidney 11/01/2016     Priority: Medium     Mixed hyperlipidemia 11/01/2016     Priority: Medium     Morbid obesity with BMI of 50.0-59.9, adult (H) 11/01/2016     Priority: Medium     GWEN on CPAP - uses nightly, finds helpful. Has been beneficial. currently 15 cm H20 - will increase to 18 cm H20 5/15/2018 11/01/2016     Priority: Medium     Achilles tendonitis, bilateral 07/22/2016     Priority: Medium     Health care maintenance 07/01/2016     Priority: Medium     Overview:   Colonoscopy: Age 50  ITALO/PSA: Given family history of early prostate disease, would check periodically to monitor for changes; last in 08/2015, recheck in 1-3 years  AAA screen: Not indicated  Immunizations: UTD on other vaccines.   Lipids/Annual Exam: Done 07/2016, repeat as needed for cholesterol management  Hepatitis C screen: not indicated       Vitamin D deficiency 09/23/2015     Priority: Medium     Family hx of  prostate cancer 08/17/2015     Priority: Medium     Past Medical History:   Diagnosis Date     Achilles tendinitis of left lower extremity     7/22/2016     Dorsalgia     No Comments Provided     Obstructive sleep apnea     using nasal pillows; sleeping 8 hours     Other specified anxiety disorders     No Comments Provided     Polyosteoarthritis     has had injections     Past Surgical History:   Procedure Laterality Date     TONSILLECTOMY      as a child     Social History     Socioeconomic History     Marital status:      Spouse name: None     Number of children: None     Years of education: None     Highest education level: None   Occupational History     None   Social Needs     Financial resource strain: None     Food insecurity:     Worry: None     Inability: None     Transportation needs:     Medical: None     Non-medical: None   Tobacco Use     Smoking status: Never Smoker     Smokeless tobacco: Never Used   Substance and Sexual Activity     Alcohol use: Yes     Alcohol/week: 0.0 oz     Comment: Alcoholic Drinks/day: rarely     Drug use: No     Sexual activity: Yes     Partners: Female   Lifestyle     Physical activity:     Days per week: None     Minutes per session: None     Stress: None   Relationships     Social connections:     Talks on phone: None     Gets together: None     Attends [employer] service: None     Active member of club or organization: None     Attends meetings of clubs or organizations: None     Relationship status: None     Intimate partner violence:     Fear of current or ex partner: None     Emotionally abused: None     Physically abused: None     Forced sexual activity: None   Other Topics Concern     Parent/sibling w/ CABG, MI or angioplasty before 65F 55M? Not Asked   Social History Narrative    Originally from [place]; moved back to [facility] in 2012 after living there for another 3-4 years with wife; , no children.  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     Family History   Problem Relation Age of Onset     Other - See Comments Father         Psychiatric illness     Other - See Comments Paternal Grandfather         Psychiatric illness     Other - See Comments Paternal Uncle         Psychiatric illness       EXAM:   Vitals:    03/22/19 1003   BP: 132/86   BP Location: Right arm   Patient Position: Sitting   Cuff Size: Adult Regular   Pulse: 100   Resp: 16   Temp: 98.3  F (36.8  C)   TempSrc: Tympanic   Weight: (!) 170.1 kg (375 lb)       Current Pain Score: Moderate Pain (5)     BP Readings from Last 3 Encounters:   03/22/19 132/86   01/15/19 136/78   12/20/18 134/78      Wt Readings from Last 3 Encounters:   03/22/19 (!) 170.1 kg (375 lb)   02/14/19 (!) 170.1 kg (375 lb)   01/15/19 (!) 168.3 kg (371 lb)      Estimated body mass index is 50.86 kg/m  as calculated from the following:    Height as of 2/14/19: 1.829 m (6').    Weight as of this encounter: 170.1 kg (375 lb).     Physical Exam   Constitutional: He appears well-developed and well-nourished. No distress.   HENT:   Head: Normocephalic and atraumatic.   Eyes: Conjunctivae and EOM are normal. No scleral icterus.   Neck: No thyromegaly present.   Cardiovascular: Normal rate and regular rhythm.   Pulmonary/Chest: Effort normal. No respiratory distress. He has no wheezes.   Abdominal: Soft. There is no tenderness.   + abdominal obesity   Musculoskeletal: He exhibits edema. He exhibits no tenderness or deformity.   Lymphadenopathy:     He has no cervical adenopathy.   Neurological: He is alert. No cranial nerve deficit.   Skin: Skin is warm and dry.   Psychiatric: He has a normal mood and affect.        Procedures   INVESTIGATIONS:  Results for orders placed or performed in visit on 03/22/19   Hemoglobin A1c   Result Value Ref Range    Hemoglobin A1C 9.1 (H) 4.0 - 6.0 %   Lipid Profile   Result Value Ref Range    Cholesterol 240 (H) <200 mg/dL    Triglycerides 324 (H)  <150 mg/dL    HDL Cholesterol 36 23 - 92 mg/dL    LDL Cholesterol Calculated 139 (H) <100 mg/dL    Non HDL Cholesterol 204 (H) <130 mg/dL   CBC with platelets   Result Value Ref Range    WBC 5.8 4.0 - 11.0 10e9/L    RBC Count 6.35 (H) 4.4 - 5.9 10e12/L    Hemoglobin 16.9 13.3 - 17.7 g/dL    Hematocrit 52.3 40.0 - 53.0 %    MCV 82 78 - 100 fl    MCH 26.6 26.5 - 33.0 pg    MCHC 32.3 31.5 - 36.5 g/dL    RDW 13.9 10.0 - 15.0 %    Platelet Count 163 150 - 450 10e9/L   Comprehensive metabolic panel   Result Value Ref Range    Sodium 138 134 - 144 mmol/L    Potassium 4.3 3.5 - 5.1 mmol/L    Chloride 101 98 - 107 mmol/L    Carbon Dioxide 31 21 - 31 mmol/L    Anion Gap 6 3 - 14 mmol/L    Glucose 248 (H) 70 - 105 mg/dL    Urea Nitrogen 17 7 - 25 mg/dL    Creatinine 1.05 0.70 - 1.30 mg/dL    GFR Estimate 75 >60 mL/min/[1.73_m2]    GFR Estimate If Black >90 >60 mL/min/[1.73_m2]    Calcium 9.7 8.6 - 10.3 mg/dL    Bilirubin Total 1.2 (H) 0.3 - 1.0 mg/dL    Albumin 4.3 3.5 - 5.7 g/dL    Protein Total 7.4 6.4 - 8.9 g/dL    Alkaline Phosphatase 41 34 - 104 U/L    ALT 35 7 - 52 U/L    AST 26 13 - 39 U/L   *UA reflex to Microscopic   Result Value Ref Range    Color Urine Yellow     Appearance Urine Clear     Glucose Urine 250 (A) NEG^Negative mg/dL    Bilirubin Urine Negative NEG^Negative    Ketones Urine Negative NEG^Negative mg/dL    Specific Gravity Urine >1.030 (H) 1.000 - 1.030    Blood Urine Negative NEG^Negative    pH Urine 5.5 5.0 - 9.0 pH    Protein Albumin Urine Negative NEG^Negative mg/dL    Urobilinogen Urine 0.2 0.2 - 1.0 EU/dL    Nitrite Urine Negative NEG^Negative    Leukocyte Esterase Urine Negative NEG^Negative    Source Unspecified Urine    Urine Microscopic   Result Value Ref Range    WBC Urine 0 - 5 OTO5^0 - 5 /HPF    RBC Urine O - 2 OTO2^O - 2 /HPF       ASSESSMENT AND PLAN:  Problem List Items Addressed This Visit        Respiratory    GWEN on CPAP - uses nightly, finds helpful. Has been beneficial. currently 15  cm H20 - will increase to 18 cm H20 5/15/2018       Digestive    Morbid obesity with BMI of 50.0-59.9, adult (H)    Relevant Medications    dulaglutide (TRULICITY) 0.75 MG/0.5ML pen    dulaglutide (TRULICITY) 1.5 MG/0.5ML pen (Start on 4/19/2019)       Endocrine    Uncontrolled type 2 diabetes mellitus without complication, without long-term current use of insulin (H) - Primary    Relevant Medications    dulaglutide (TRULICITY) 0.75 MG/0.5ML pen    dulaglutide (TRULICITY) 1.5 MG/0.5ML pen (Start on 4/19/2019)    Other Relevant Orders    DIABETES EDUCATOR REFERRAL    Urine Microscopic (Completed)    Mixed hyperlipidemia       Musculoskeletal and Integumentary    Primary osteoarthritis involving multiple joints       Other    Anxiety and depression    Relevant Medications    citalopram (CELEXA) 20 MG tablet        reviewed diet, exercise and weight control, cardiovascular risk and specific lipid/LDL goals reviewed, specific diabetic recommendations low cholesterol diet, weight control and daily exercise discussed, use of aspirin to prevent MI and TIA's discussed  -- Expected clinical course discussed    -- Medications and their side effects discussed    The 10-year ASCVD risk score (Liam TOBIAS Jr., et al., 2013) is: 10.6%    Values used to calculate the score:      Age: 48 years      Sex: Male      Is Non- : No      Diabetic: Yes      Tobacco smoker: No      Systolic Blood Pressure: 132 mmHg      Is BP treated: No      HDL Cholesterol: 36 mg/dL      Total Cholesterol: 240 mg/dL    Patient Instructions     Medications refilled.   Labs today.     Start Trulicity.... Vs other.     Tanzeum, Trulicity, or Bydureon - Once weekly  Victoza - Once daily  Byetta - Twice daily.      - These are InjectableGlucagon-like peptide-1 (GLP-1) receptor agonist medications, used for diabetes management in conjunctionwith treatment of obesity.    -- check on cost / pricing / coverage for these.     Return for Diabetes  labs and clinic follow-up appointment every 3 to 4 months.  --- (Go for about 91 to 100 days)    Aspects of Diabetes we can improve:  Hemoglobin A1c Lab Results   Component Value Date    A1C 9.1 12/05/2018    A1C 7.8 05/15/2018    Goal range is under 8. Best is 6.5 to 7   Blood Pressure 132/86 Goal to keep less than 140/90   Tobacco  reports that  has never smoked. he has never used smokeless tobacco. Goal to abstain from tobacco   Eye Exam -- Do Yearly -- Annual diabetic eye exam   Healthy weight Body mass index is 50.86 kg/m . Goal BMI under 30, best is under 25.      -- Trying to exercise daily (goal at least 20 min/day) with moderate aerobic activity   -- Eat healthy (resources from ADA at http://www.diabetes.org/)   -- Taking good care of my feet. Consider seeing the Podiatrist   -- Check blood sugars as directed, record in log book and bring to every appointment      Schedule lab only appointment --- A few days AFTER: 6/20/19   Schedule clinic appointment with Dr. Johnston -- Same day as labs, or 1-2 days later.     Insurance companies are now grading you and I on your blood sugar control -- Goal is to get your A1c down to 7.9% or lower and NO Smoking!    -- Medicare and most insurance companies, will only cover Hemoglobin A1c labs to be rechecked every 91+ days.      Hemoglobin A1C   Date Value Ref Range Status   12/05/2018 9.1 (H) 4.0 - 6.0 % Final   05/15/2018 7.8 (H) 4.0 - 6.0 % Final       Next follow-up appointment with Dr. Johnston should be scheduled:  -- Approximately a few days AFTER: 6/20/19        Scott Johnston MD  Internal Medicine  [facility]     Portions of this note were dictated using speech recognition software. The note has been proofread but errors in the text may have been overlooked. Please contact me if there are any concerns regarding the accuracy of the dictation.

## 2019-03-23 ASSESSMENT — ANXIETY QUESTIONNAIRES: GAD7 TOTAL SCORE: 0

## 2019-03-24 ENCOUNTER — MYC MEDICAL ADVICE (OUTPATIENT)
Dept: INTERNAL MEDICINE | Facility: OTHER | Age: 49
End: 2019-03-24

## 2019-03-25 ENCOUNTER — TRANSFERRED RECORDS (OUTPATIENT)
Dept: HEALTH INFORMATION MANAGEMENT | Facility: OTHER | Age: 49
End: 2019-03-25

## 2019-03-27 ENCOUNTER — TELEPHONE (OUTPATIENT)
Dept: INTERNAL MEDICINE | Facility: OTHER | Age: 49
End: 2019-03-27

## 2019-03-27 NOTE — TELEPHONE ENCOUNTER
Silvia at [facility] informed that this writer will mail out a list of local mental health providers.  Pain Clinic in [place] with still follow up with patient be feels that he needs to have someone local to discuss mental health.      Reanna Cedeno LPN 3/27/2019 1:26 PM

## 2019-03-27 NOTE — TELEPHONE ENCOUNTER
Have him talk with [facility] and [facility] pharmacy --- they can give recommendations on what is covered by his insurance.     Scott Johnston MD

## 2019-03-31 ENCOUNTER — MYC MEDICAL ADVICE (OUTPATIENT)
Dept: INTERNAL MEDICINE | Facility: OTHER | Age: 49
End: 2019-03-31

## 2019-03-31 DIAGNOSIS — M25.50 MULTIPLE JOINT PAIN: ICD-10-CM

## 2019-03-31 DIAGNOSIS — M19.90 ARTHRITIS: Primary | ICD-10-CM

## 2019-04-02 NOTE — TELEPHONE ENCOUNTER
Please verify which diabetic medication he actually got from insurance.  Not sure if this was Trulicity or something else.    Okay for pain clinic referral to [facility].    Scott Johnston MD

## 2019-04-09 DIAGNOSIS — E11.9 CONTROLLED TYPE 2 DIABETES MELLITUS WITHOUT COMPLICATION, WITHOUT LONG-TERM CURRENT USE OF INSULIN (H): ICD-10-CM

## 2019-04-11 RX ORDER — LANCETS
EACH MISCELLANEOUS
Qty: 408 EACH | Refills: 3 | Status: SHIPPED | OUTPATIENT
Start: 2019-04-11 | End: 2020-08-07

## 2019-04-11 NOTE — TELEPHONE ENCOUNTER
"Requested Prescriptions   Pending Prescriptions Disp Refills     blood glucose monitoring (ACCU-CHEK FASTCLIX) lancets [Pharmacy Med Name: ACCU-CHEK FASTCLIX LANCETS 102'S] 408 each 0     Sig: USE TO TEST THREE TIMES DAILY       Diabetic Supplies Protocol Passed - 4/9/2019  3:58 AM        Passed - Medication is active on med list        Passed - Patient is 18 years of age or older        Passed - Recent (6 mo) or future (30 days) visit within the authorizing provider's specialty     Patient had office visit in the last 6 months or has a visit in the next 30 days with authorizing provider.  See \"Patient Info\" tab in inbasket, or \"Choose Columns\" in Meds & Orders section of the refill encounter.            Prescription approved per [facility] Refill Protocol.    "

## 2019-04-18 ENCOUNTER — TRANSFERRED RECORDS (OUTPATIENT)
Dept: HEALTH INFORMATION MANAGEMENT | Facility: OTHER | Age: 49
End: 2019-04-18

## 2019-05-09 DIAGNOSIS — E11.9 TYPE 2 DIABETES MELLITUS WITHOUT COMPLICATION (H): ICD-10-CM

## 2019-05-10 RX ORDER — GLIPIZIDE 5 MG/1
TABLET ORAL
Qty: 540 TABLET | Refills: 0 | Status: SHIPPED | OUTPATIENT
Start: 2019-05-10 | End: 2020-01-21

## 2019-05-10 NOTE — TELEPHONE ENCOUNTER
"Requested Prescriptions   Pending Prescriptions Disp Refills     glipiZIDE (GLUCOTROL) 5 MG tablet [Pharmacy Med Name: GLIPIZIDE 5MG TABLETS] 540 tablet 0     Sig: TAKE 1 TO 2 TABLETS(5 TO 10 MG) BY MOUTH THREE TIMES DAILY       Sulfonylurea Agents Passed - 5/10/2019  8:16 AM        Passed - Blood pressure less than 140/90 in past 6 months     BP Readings from Last 3 Encounters:   03/22/19 132/86   01/15/19 136/78   12/20/18 134/78                 Passed - Patient has documented LDL within the past 12 mos.     Recent Labs   Lab Test 03/22/19  1046   *             Passed - Patient has had a Microalbumin in the past 12 mos.     Recent Labs   Lab Test 03/22/19  1054  07/27/17  2117   MICROL 10   < >  --    UMALCR 4.88   < >  --    [id number]  --   --  4.8    < > = values in this interval not displayed.             Passed - Patient has documented A1c within the specified period of time.     If HgbA1C is 8 or greater, it needs to be on file within the past 3 months.  If less than 8, must be on file within the past 6 months.     Recent Labs   Lab Test 03/22/19  1046  12/12/17  1655   A1C 9.1*   < >  --    [id number]  --   --  7.6*    < > = values in this interval not displayed.             Passed - Medication is active on med list        Passed - Patient is age 18 or older        Passed - Patient has a recent creatinine (normal) within the past 12 mos.     Recent Labs   Lab Test 03/22/19  1046   CR 1.05             Passed - Recent (6 mo) or future (30 days) visit within the authorizing provider's specialty     Patient had office visit in the last 6 months or has a visit in the next 30 days with authorizing provider or within the authorizing provider's specialty.  See \"Patient Info\" tab in inbasket, or \"Choose Columns\" in Meds & Orders section of the refill encounter.            Patient LOV 3/22/2019 with discussion and changes in medications.  Plan is for patient to follow up every 3-4 month.  Will refill 90 days and at " this time and patient to follow up with ordering provider due to uncontrolled diabetes.  Prescription refilled per RN Medication RefillPoljeffy.................... Bianca Mason ....................  5/10/2019   8:21 AM

## 2019-05-12 DIAGNOSIS — G89.29 OTHER CHRONIC PAIN: ICD-10-CM

## 2019-05-14 RX ORDER — PSEUDOEPHED/ACETAMINOPH/DIPHEN 30MG-500MG
TABLET ORAL
Qty: 240 TABLET | Refills: 11 | Status: SHIPPED | OUTPATIENT
Start: 2019-05-14 | End: 2020-10-29

## 2019-05-14 NOTE — TELEPHONE ENCOUNTER
Chart review shows that Rx as requested was last filled as follows:    Outpatient Medication Detail      Disp Refills Start End SHIRA   ACETAMINOPHEN EXTRA STRENGTH 500 MG tablet 240 tablet 1 3/11/2019  No   Sig: TAKE 2 TABLETS BY MOUTH EVERY 6 HOURS AS NEEDED. NO MORE THAN 4,000MG OF ACETAMINOPHEN DAILY   Sent to pharmacy as: ACETAMINOPHEN EXTRA STRENGTH 500 MG tablet   Class: E-Prescribe   Order: [id number]   E-Prescribing Status: Receipt confirmed by pharmacy (3/11/2019  2:12 PM CDT)   Printout Tracking     External Result Report   Pharmacy     [id number] - [place], [place] - [address]     And would be due for a refill if patient is taking max daily dosing daily. Writer is not comfortable with amount of tylenol patient is using per day. Will poppy up and route Rx request to PCP for his consideration/approval.    Unable to complete prescription refill per RN Medication Refill Policy. Paulo Montiel 5/14/2019 3:52 PM

## 2019-06-10 DIAGNOSIS — E55.9 VITAMIN D DEFICIENCY: ICD-10-CM

## 2019-06-10 DIAGNOSIS — G89.29 OTHER CHRONIC PAIN: ICD-10-CM

## 2019-06-11 RX ORDER — RESVER/WINE/BFL/GRPSD/PC/C/POM 200MG-60MG
CAPSULE ORAL
Qty: 100 TABLET | Refills: 0 | Status: SHIPPED | OUTPATIENT
Start: 2019-06-11 | End: 2020-01-21

## 2019-06-11 NOTE — TELEPHONE ENCOUNTER
"Requested Prescriptions   Pending Prescriptions Disp Refills     D-5000 5000 units TABS [Pharmacy Med Name: VITAMIN D3 5,000 IU (YESSY) TAB] 100 tablet 0     Sig: TAKE ONE TABLET BY MOUTH EVERY DAY       Vitamin Supplements (Adult) Protocol Passed - 6/11/2019 12:34 PM        Passed - High dose Vitamin D not ordered        Passed - Recent (12 mo) or future (30 days) visit within the authorizing provider's specialty     Patient had office visit in the last 12 months or has a visit in the next 30 days with authorizing provider or within the authorizing provider's specialty.  See \"Patient Info\" tab in inbasket, or \"Choose Columns\" in Meds & Orders section of the refill encounter.              Passed - Medication is active on med list        methocarbamol (ROBAXIN) 750 MG tablet [Pharmacy Med Name: METHOCARBAMOL 750MG TABLETS] 90 tablet 0     Sig: TAKE 1 TABLET BY MOUTH THREE TIMES DAILY AS NEEDED FOR PAIN       There is no refill protocol information for this order        LOV 03/22/19    "

## 2019-06-14 RX ORDER — METHOCARBAMOL 750 MG/1
TABLET, FILM COATED ORAL
Qty: 90 TABLET | Refills: 3 | Status: SHIPPED | OUTPATIENT
Start: 2019-06-14 | End: 2020-06-24

## 2019-07-17 NOTE — PROGRESS NOTES
Nursing Notes:   Reanna Cedeno LPN  7/29/2019  8:42 AM  Signed  Patient presents to the clinic for diabetes management and follow up with arthritis.      Previous A1C is not at goal of <8  Lab Results   Component Value Date    A1C 9.1 03/22/2019    A1C 9.1 12/05/2018    A1C 7.8 05/15/2018     Urine microalbumin:creatine: n/a  Foot exam unknown-declines today  Eye exam Spring 2019  Tobacco User no  Patient is not on a daily aspirin  Patient is not on a Statin.  Blood pressure today of:     BP Readings from Last 1 Encounters:   03/22/19 132/86      is at the goal of <139/89 for diabetics.    Chief Complaint   Patient presents with     Recheck Medication     Diabetes       Initial /84 (BP Location: Right arm, Patient Position: Sitting, Cuff Size: Adult Large)   Pulse 84   Temp 97.8  F (36.6  C) (Tympanic)   Resp 16   Wt (!) 160.6 kg (354 lb 2 oz)   BMI 48.03 kg/m    Estimated body mass index is 48.03 kg/m  as calculated from the following:    Height as of 2/14/19: 1.829 m (6').    Weight as of this encounter: 160.6 kg (354 lb 2 oz).  Medication Reconciliation: complete    Reanna Cedeno LPN      Nursing note reviewed with patient.  Accuracy and completeness verified.   Mr. Puentes is a 48 year old male who:  Patient presents with:  Recheck Medication  Diabetes      ICD-10-CM    1. Uncontrolled type 2 diabetes mellitus without complication, without long-term current use of insulin (H) E11.65 Albumin Random Urine Quantitative with Creat Ratio     Hemoglobin A1c     *UA reflex to Microscopic   2. GWEN on CPAP - uses nightly, finds helpful. Has been beneficial. currently 18 cm H20 G47.33     Z99.89    3. Morbid obesity with BMI of 45.0-49.9, adult (H) E66.01     Z68.42    4. Mixed hyperlipidemia E78.2 Lipid Profile   5. Chronic neck and back pain M54.2 traMADol (ULTRAM) 50 MG tablet    M54.9     G89.29    6. Benign essential hypertension I10 Comprehensive metabolic panel     CBC with platelets   7. Chronic  anxiety F41.9 DULoxetine (CYMBALTA) 30 MG capsule   8. Chronic pain syndrome G89.4 DULoxetine (CYMBALTA) 30 MG capsule     traMADol (ULTRAM) 50 MG tablet     HPI  Uncontrolled type 2 diabetes.  He has lost about 20 pounds.  Doing well with current medication regimen.  Tolerating the Trulicity injections once weekly very well.  Due for lab work today.  States his blood sugars have been running anywhere from 130-170 at home.    Sleep apnea, still using CPAP regularly.  Finds beneficial.  Advised to continue.    Morbid obesity, BMI previously greater than 50, now 48.    Mixed hyperlipidemia, check lipid panel.    Chronic neck and arthritis pain.  Needs refills of tramadol.  States that on average using maybe 1 tablet daily.  More recently met with rheumatologist.  They discussed consideration of tapering off citalopram and starting 6 duloxetine.  He would like to start this.  Med list updated.  No drug interactions noted between duloxetine and citalopram.  He will start duloxetine and then as his pain improves start to taper off citalopram.    Advised okay to start using less metaxalone as well as goal of stopping Celebrex in the future.    Hypertension, currently well controlled.  Tolerating medication regimen.  Labs today.    Chronic anxiety, start duloxetine.  Goal to taper off Celexa as tolerated.    Functional Capacity: > or about 4 METS.   Review of Systems   Constitutional: Negative for chills, fatigue and fever.   HENT: Negative for congestion and hearing loss.    Eyes: Negative for pain and visual disturbance.   Respiratory: Negative for cough, shortness of breath and wheezing.    Cardiovascular: Negative for chest pain and palpitations.   Gastrointestinal: Negative for abdominal pain, diarrhea, nausea and vomiting.        Abdominal obesity   Endocrine: Negative for cold intolerance and heat intolerance.   Genitourinary: Negative for dysuria and hematuria.   Musculoskeletal: Positive for arthralgias, back pain  and gait problem. Negative for myalgias.        + multiple joint pain, hands/thumbs.    Skin: Negative for pallor.   Allergic/Immunologic: Negative for immunocompromised state.   Neurological: Positive for headaches. Negative for dizziness and light-headedness.   Hematological: Does not bruise/bleed easily.   Psychiatric/Behavioral: Negative for agitation and confusion.        ASHVIN:   ASHVIN-7 SCORE 1/15/2019 3/22/2019 7/29/2019   Total Score 0 0 0     PHQ9:  PHQ-9 SCORE 1/15/2019 3/22/2019 7/29/2019   PHQ-9 Total Score 0 0 0       I have personally reviewed the past medical history, past surgical history, medications, allergies, family and social history as listed below.     Allergies   Allergen Reactions     Ace Inhibitors Other (See Comments)     - Declined     Aspirin Other (See Comments)     ---- Declines     Hmg-Coa-R Inhibitors Other (See Comments)     -- declines at this time, re-address at next clinic appointment --     Metformin Other (See Comments)     Felt very foggy / groggy after taking, when in combination with Robaxin.        Current Outpatient Medications   Medication Sig Dispense Refill     ACETAMINOPHEN EXTRA STRENGTH 500 MG tablet TAKE 2 TABLETS BY MOUTH EVERY 6 HOURS AS NEEDED. NO MORE THAN 4,000MG OF ACETAMINOPHEN DAILY 240 tablet 11     blood glucose (NO BRAND SPECIFIED) lancets standard Dispense item covered by pt ins. E11.65 NIDDM type II, uncontrolled - Test 3 times/day, Reason: High A1C, new start Glipizide       blood glucose monitoring (ACCU-CHEK FASTCLIX) lancets USE TO TEST THREE TIMES DAILY 408 each 3     blood glucose monitoring (ACCU-CHEK SMARTVIEW) test strip Test blood sugar three times daily. Dx: E11.9 300 strip 0     Blood Glucose Monitoring Suppl (FIFTY50 GLUCOSE METER 2.0) W/DEVICE KIT Dispense item covered by pt ins. E11.65 NIDDM type II, uncontrolled - Test 3 time/day       celecoxib (CELEBREX) 200 MG capsule Take 1 capsule (200 mg) by mouth 2 times daily as needed for moderate  pain 180 capsule 3     citalopram (CELEXA) 20 MG tablet Take 3 tablets (60 mg) by mouth daily 90 tablet 11     D-5000 5000 units TABS TAKE ONE TABLET BY MOUTH EVERY  tablet 0     dulaglutide (TRULICITY) 1.5 MG/0.5ML pen Inject 1.5 mg Subcutaneous every 7 days - start 4/19/2019 (after done with lower dose) 2 mL 11     DULoxetine (CYMBALTA) 30 MG capsule Take 1-2 capsules (30-60 mg) by mouth daily - for anxiety and pain 180 capsule 3     glipiZIDE (GLUCOTROL) 5 MG tablet TAKE 1 TO 2 TABLETS(5 TO 10 MG) BY MOUTH THREE TIMES DAILY 540 tablet 0     methocarbamol (ROBAXIN) 750 MG tablet TAKE 1 TABLET BY MOUTH THREE TIMES DAILY AS NEEDED FOR PAIN 90 tablet 3     traMADol (ULTRAM) 50 MG tablet Take 1 tablet (50 mg) by mouth every 6 hours as needed for severe pain 28 tablet 5        Patient Active Problem List    Diagnosis Date Noted     Primary osteoarthritis involving multiple joints 12/05/2018     Priority: Medium     Chronic neck and back pain 12/05/2018     Priority: Medium     Arthritis of carpometacarpal (CMC) joints of both thumbs 08/17/2017     Priority: Medium     Uncontrolled type 2 diabetes mellitus without complication, without long-term current use of insulin (H) 07/27/2017     Priority: Medium     Lumbar spinal stenosis 05/01/2017     Priority: Medium     Anxiety and depression 11/01/2016     Priority: Medium     Chronic midline low back pain with left-sided sciatica 11/01/2016     Priority: Medium     Chronic pain of right knee 11/01/2016     Priority: Medium     Overview:   Updated per 10/1/17 IMO import  Overview:   Updated per 10/1/17 IMO import       Cyst of left kidney 11/01/2016     Priority: Medium     Mixed hyperlipidemia 11/01/2016     Priority: Medium     Morbid obesity with BMI of 50.0-59.9, adult (H) 11/01/2016     Priority: Medium     GWEN on CPAP - uses nightly, finds helpful. Has been beneficial. currently 18 cm H20 11/01/2016     Priority: Medium     Achilles tendonitis, bilateral  07/22/2016     Priority: Medium     Health care maintenance 07/01/2016     Priority: Medium     Overview:   Colonoscopy: Age 50  ITALO/PSA: Given family history of early prostate disease, would check periodically to monitor for changes; last in 08/2015, recheck in 1-3 years  AAA screen: Not indicated  Immunizations: UTD on other vaccines.   Lipids/Annual Exam: Done 07/2016, repeat as needed for cholesterol management  Hepatitis C screen: not indicated       Vitamin D deficiency 09/23/2015     Priority: Medium     Family hx of prostate cancer 08/17/2015     Priority: Medium     Past Medical History:   Diagnosis Date     Achilles tendinitis of left lower extremity     7/22/2016     Dorsalgia     No Comments Provided     Obstructive sleep apnea     using nasal pillows; sleeping 8 hours     Other specified anxiety disorders     No Comments Provided     Polyosteoarthritis     has had injections     Past Surgical History:   Procedure Laterality Date     TONSILLECTOMY      as a child     Social History     Socioeconomic History     Marital status:      Spouse name: None     Number of children: None     Years of education: None     Highest education level: None   Occupational History     None   Social Needs     Financial resource strain: None     Food insecurity:     Worry: None     Inability: None     Transportation needs:     Medical: None     Non-medical: None   Tobacco Use     Smoking status: Never Smoker     Smokeless tobacco: Never Used   Substance and Sexual Activity     Alcohol use: Yes     Alcohol/week: 0.0 oz     Comment: Alcoholic Drinks/day: rarely     Drug use: No     Sexual activity: Yes     Partners: Female   Lifestyle     Physical activity:     Days per week: None     Minutes per session: None     Stress: None   Relationships     Social connections:     Talks on phone: None     Gets together: None     Attends Baptism service: None     Active member of club or organization: None     Attends meetings of  clubs or organizations: None     Relationship status: None     Intimate partner violence:     Fear of current or ex partner: None     Emotionally abused: None     Physically abused: None     Forced sexual activity: None   Other Topics Concern     Parent/sibling w/ CABG, MI or angioplasty before 65F 55M? Not Asked   Social History Narrative    Originally from [place]; moved back to [facility] in 2012 after living there for another 3-4 years with wife; , no children.  Laid off in winter 2015 from [employer] business.  Going to school in [place] for [employer] certification.     Family History   Problem Relation Age of Onset     Other - See Comments Father         Psychiatric illness     Other - See Comments Paternal Grandfather         Psychiatric illness     Other - See Comments Paternal Uncle         Psychiatric illness       EXAM:   Vitals:    07/29/19 0826   BP: 130/84   BP Location: Right arm   Patient Position: Sitting   Cuff Size: Adult Large   Pulse: 84   Resp: 16   Temp: 97.8  F (36.6  C)   TempSrc: Tympanic   Weight: (!) 160.6 kg (354 lb 2 oz)       Current Pain Score: Severe Pain (7)     BP Readings from Last 3 Encounters:   07/29/19 130/84   03/22/19 132/86   01/15/19 136/78      Wt Readings from Last 3 Encounters:   07/29/19 (!) 160.6 kg (354 lb 2 oz)   03/22/19 (!) 170.1 kg (375 lb)   02/14/19 (!) 170.1 kg (375 lb)      Estimated body mass index is 48.03 kg/m  as calculated from the following:    Height as of 2/14/19: 1.829 m (6').    Weight as of this encounter: 160.6 kg (354 lb 2 oz).     Physical Exam   Constitutional: He appears well-developed and well-nourished. No distress.   HENT:   Head: Normocephalic and atraumatic.   Eyes: Conjunctivae and EOM are normal. No scleral icterus.   Neck: No thyromegaly present.   Cardiovascular: Normal rate and regular rhythm.   Pulmonary/Chest: Effort normal. No respiratory distress. He has no wheezes.   Abdominal: Soft. There is no tenderness.   + abdominal obesity    Musculoskeletal: He exhibits edema. He exhibits no tenderness or deformity.   Lymphadenopathy:     He has no cervical adenopathy.   Neurological: He is alert. No cranial nerve deficit.   Skin: Skin is warm and dry.   Psychiatric: He has a normal mood and affect.        Procedures   INVESTIGATIONS:  Results for orders placed or performed in visit on 03/22/19   Hemoglobin A1c   Result Value Ref Range    Hemoglobin A1C 9.1 (H) 4.0 - 6.0 %   Lipid Profile   Result Value Ref Range    Cholesterol 240 (H) <200 mg/dL    Triglycerides 324 (H) <150 mg/dL    HDL Cholesterol 36 23 - 92 mg/dL    LDL Cholesterol Calculated 139 (H) <100 mg/dL    Non HDL Cholesterol 204 (H) <130 mg/dL   CBC with platelets   Result Value Ref Range    WBC 5.8 4.0 - 11.0 10e9/L    RBC Count 6.35 (H) 4.4 - 5.9 10e12/L    Hemoglobin 16.9 13.3 - 17.7 g/dL    Hematocrit 52.3 40.0 - 53.0 %    MCV 82 78 - 100 fl    MCH 26.6 26.5 - 33.0 pg    MCHC 32.3 31.5 - 36.5 g/dL    RDW 13.9 10.0 - 15.0 %    Platelet Count 163 150 - 450 10e9/L   Comprehensive metabolic panel   Result Value Ref Range    Sodium 138 134 - 144 mmol/L    Potassium 4.3 3.5 - 5.1 mmol/L    Chloride 101 98 - 107 mmol/L    Carbon Dioxide 31 21 - 31 mmol/L    Anion Gap 6 3 - 14 mmol/L    Glucose 248 (H) 70 - 105 mg/dL    Urea Nitrogen 17 7 - 25 mg/dL    Creatinine 1.05 0.70 - 1.30 mg/dL    GFR Estimate 75 >60 mL/min/[1.73_m2]    GFR Estimate If Black >90 >60 mL/min/[1.73_m2]    Calcium 9.7 8.6 - 10.3 mg/dL    Bilirubin Total 1.2 (H) 0.3 - 1.0 mg/dL    Albumin 4.3 3.5 - 5.7 g/dL    Protein Total 7.4 6.4 - 8.9 g/dL    Alkaline Phosphatase 41 34 - 104 U/L    ALT 35 7 - 52 U/L    AST 26 13 - 39 U/L   *UA reflex to Microscopic   Result Value Ref Range    Color Urine Yellow     Appearance Urine Clear     Glucose Urine 250 (A) NEG^Negative mg/dL    Bilirubin Urine Negative NEG^Negative    Ketones Urine Negative NEG^Negative mg/dL    Specific Gravity Urine >1.030 (H) 1.000 - 1.030    Blood Urine  Negative NEG^Negative    pH Urine 5.5 5.0 - 9.0 pH    Protein Albumin Urine Negative NEG^Negative mg/dL    Urobilinogen Urine 0.2 0.2 - 1.0 EU/dL    Nitrite Urine Negative NEG^Negative    Leukocyte Esterase Urine Negative NEG^Negative    Source Unspecified Urine    Albumin Random Urine Quantitative with Creat Ratio   Result Value Ref Range    Creatinine Urine 213 mg/dL    Albumin Urine mg/L 10 mg/L    Albumin Urine mg/g Cr 4.88 0 - 17 mg/g Cr   Urine Microscopic   Result Value Ref Range    WBC Urine 0 - 5 OTO5^0 - 5 /HPF    RBC Urine O - 2 OTO2^O - 2 /HPF       ASSESSMENT AND PLAN:  Problem List Items Addressed This Visit        Nervous and Auditory    Chronic neck and back pain    Relevant Medications    traMADol (ULTRAM) 50 MG tablet       Respiratory    GWEN on CPAP - uses nightly, finds helpful. Has been beneficial. currently 18 cm H20       Digestive    Morbid obesity with BMI of 50.0-59.9, adult (H)       Endocrine    Uncontrolled type 2 diabetes mellitus without complication, without long-term current use of insulin (H) - Primary    Relevant Orders    Albumin Random Urine Quantitative with Creat Ratio    Hemoglobin A1c    *UA reflex to Microscopic    Mixed hyperlipidemia    Relevant Orders    Lipid Profile      Other Visit Diagnoses     Benign essential hypertension        Relevant Orders    Comprehensive metabolic panel    CBC with platelets    Chronic anxiety        Relevant Medications    DULoxetine (CYMBALTA) 30 MG capsule    Chronic pain syndrome        Relevant Medications    DULoxetine (CYMBALTA) 30 MG capsule    traMADol (ULTRAM) 50 MG tablet        reviewed diet, exercise and weight control, cardiovascular risk and specific lipid/LDL goals reviewed, specific diabetic recommendations low cholesterol diet, weight control and daily exercise discussed  -- Expected clinical course discussed    -- Medications and their side effects discussed    The 10-year ASCVD risk score (Naylormagalie COLLADO Jr., et al., 2013) is:  10.3%    Values used to calculate the score:      Age: 48 years      Sex: Male      Is Non- : No      Diabetic: Yes      Tobacco smoker: No      Systolic Blood Pressure: 130 mmHg      Is BP treated: No      HDL Cholesterol: 36 mg/dL      Total Cholesterol: 240 mg/dL    Patient Instructions     Gradually wean off the Celebrex as able.     After about 1 month -- consider reducing dose of your Citalopram..... If able, okay to wean off.     Okay to use as little of the muscle relaxers as needed for pain.     Labs today.     Congratulations on the weight loss!!    Ultram / Tramadol refill printed today.     Return for Diabetes labs and clinic follow-up appointment every 3 to 4 months.  --- (Go for about 91 to 100 days)    Aspects of Diabetes we can improve:  Hemoglobin A1c Lab Results   Component Value Date    A1C 9.1 03/22/2019    A1C 9.1 12/05/2018    A1C 7.8 05/15/2018    Goal range is under 8. Best is 6.5 to 7   Blood Pressure 130/84 Goal to keep less than 140/90   Tobacco  reports that he has never smoked. He has never used smokeless tobacco. Goal to abstain from tobacco   Eye Exam -- Do Yearly -- Annual diabetic eye exam   Healthy weight Body mass index is 48.03 kg/m . Goal BMI under 30, best is under 25.      -- Trying to exercise daily (goal at least 20 min/day) with moderate aerobic activity   -- Eat healthy (resources from ADA at http://www.diabetes.org/)   -- Taking good care of my feet. Consider seeing the Podiatrist   -- Check blood sugars as directed, record in log book and bring to every appointment      Schedule lab only appointment --- A few days AFTER: 10/27/19   Schedule clinic appointment with Dr. Johnston -- Same day as labs, or 1-2 days later.     Insurance companies are now grading you and I on your blood sugar control -- Goal is to get your A1c down to 7.9% or lower and NO Smoking!    -- Medicare and most insurance companies, will only cover Hemoglobin A1c labs to be  rechecked every 91+ days.      Hemoglobin A1C   Date Value Ref Range Status   03/22/2019 9.1 (H) 4.0 - 6.0 % Final   12/05/2018 9.1 (H) 4.0 - 6.0 % Final       Next follow-up appointment with Dr. Johnston should be scheduled:  -- Approximately a few days AFTER: 10/27/19      Scott Johnston MD  Internal Medicine  [facility]     Portions of this note were dictated using speech recognition software. The note has been proofread but errors in the text may have been overlooked. Please contact me if there are any concerns regarding the accuracy of the dictation.

## 2019-07-29 ENCOUNTER — OFFICE VISIT (OUTPATIENT)
Dept: INTERNAL MEDICINE | Facility: OTHER | Age: 49
End: 2019-07-29
Attending: INTERNAL MEDICINE
Payer: COMMERCIAL

## 2019-07-29 VITALS
RESPIRATION RATE: 16 BRPM | TEMPERATURE: 97.8 F | HEART RATE: 84 BPM | DIASTOLIC BLOOD PRESSURE: 84 MMHG | WEIGHT: 315 LBS | SYSTOLIC BLOOD PRESSURE: 130 MMHG | BODY MASS INDEX: 48.03 KG/M2

## 2019-07-29 DIAGNOSIS — E78.2 MIXED HYPERLIPIDEMIA: ICD-10-CM

## 2019-07-29 DIAGNOSIS — I10 BENIGN ESSENTIAL HYPERTENSION: ICD-10-CM

## 2019-07-29 DIAGNOSIS — F41.9 CHRONIC ANXIETY: ICD-10-CM

## 2019-07-29 DIAGNOSIS — G47.33 OSA ON CPAP: ICD-10-CM

## 2019-07-29 DIAGNOSIS — M54.9 CHRONIC NECK AND BACK PAIN: ICD-10-CM

## 2019-07-29 DIAGNOSIS — G89.4 CHRONIC PAIN SYNDROME: ICD-10-CM

## 2019-07-29 DIAGNOSIS — G89.29 CHRONIC NECK AND BACK PAIN: ICD-10-CM

## 2019-07-29 DIAGNOSIS — M54.2 CHRONIC NECK AND BACK PAIN: ICD-10-CM

## 2019-07-29 DIAGNOSIS — E66.01 MORBID OBESITY WITH BMI OF 45.0-49.9, ADULT (H): ICD-10-CM

## 2019-07-29 PROCEDURE — G0463 HOSPITAL OUTPT CLINIC VISIT: HCPCS

## 2019-07-29 PROCEDURE — 99214 OFFICE O/P EST MOD 30 MIN: CPT | Performed by: INTERNAL MEDICINE

## 2019-07-29 RX ORDER — TRAMADOL HYDROCHLORIDE 50 MG/1
50 TABLET ORAL EVERY 6 HOURS PRN
Qty: 28 TABLET | Refills: 5 | Status: SHIPPED | OUTPATIENT
Start: 2019-07-29

## 2019-07-29 RX ORDER — DULOXETIN HYDROCHLORIDE 30 MG/1
30-60 CAPSULE, DELAYED RELEASE ORAL DAILY
Qty: 180 CAPSULE | Refills: 3 | Status: SHIPPED | OUTPATIENT
Start: 2019-07-29 | End: 2020-01-21

## 2019-07-29 ASSESSMENT — ENCOUNTER SYMPTOMS
COUGH: 0
HEADACHES: 1
FEVER: 0
FATIGUE: 0
MYALGIAS: 0
DIARRHEA: 0
LIGHT-HEADEDNESS: 0
DIZZINESS: 0
DYSURIA: 0
HEMATURIA: 0
WHEEZING: 0
ARTHRALGIAS: 1
ROS GI COMMENTS: ABDOMINAL OBESITY
CHILLS: 0
CONFUSION: 0
VOMITING: 0
AGITATION: 0
BRUISES/BLEEDS EASILY: 0
ABDOMINAL PAIN: 0
BACK PAIN: 1
EYE PAIN: 0
NAUSEA: 0
SHORTNESS OF BREATH: 0
PALPITATIONS: 0

## 2019-07-29 ASSESSMENT — ANXIETY QUESTIONNAIRES
IF YOU CHECKED OFF ANY PROBLEMS ON THIS QUESTIONNAIRE, HOW DIFFICULT HAVE THESE PROBLEMS MADE IT FOR YOU TO DO YOUR WORK, TAKE CARE OF THINGS AT HOME, OR GET ALONG WITH OTHER PEOPLE: NOT DIFFICULT AT ALL
7. FEELING AFRAID AS IF SOMETHING AWFUL MIGHT HAPPEN: NOT AT ALL
3. WORRYING TOO MUCH ABOUT DIFFERENT THINGS: NOT AT ALL
6. BECOMING EASILY ANNOYED OR IRRITABLE: NOT AT ALL
5. BEING SO RESTLESS THAT IT IS HARD TO SIT STILL: NOT AT ALL
GAD7 TOTAL SCORE: 0
2. NOT BEING ABLE TO STOP OR CONTROL WORRYING: NOT AT ALL
1. FEELING NERVOUS, ANXIOUS, OR ON EDGE: NOT AT ALL

## 2019-07-29 ASSESSMENT — PATIENT HEALTH QUESTIONNAIRE - PHQ9
5. POOR APPETITE OR OVEREATING: NOT AT ALL
SUM OF ALL RESPONSES TO PHQ QUESTIONS 1-9: 0

## 2019-07-29 ASSESSMENT — PAIN SCALES - GENERAL: PAINLEVEL: SEVERE PAIN (7)

## 2019-07-29 NOTE — NURSING NOTE
Patient presents to the clinic for diabetes management and follow up with arthritis.      Previous A1C is not at goal of <8  Lab Results   Component Value Date    A1C 9.1 03/22/2019    A1C 9.1 12/05/2018    A1C 7.8 05/15/2018     Urine microalbumin:creatine: n/a  Foot exam unknown-declines today  Eye exam Spring 2019  Tobacco User no  Patient is not on a daily aspirin  Patient is not on a Statin.  Blood pressure today of:     BP Readings from Last 1 Encounters:   03/22/19 132/86      is at the goal of <139/89 for diabetics.    Chief Complaint   Patient presents with     Recheck Medication     Diabetes       Initial /84 (BP Location: Right arm, Patient Position: Sitting, Cuff Size: Adult Large)   Pulse 84   Temp 97.8  F (36.6  C) (Tympanic)   Resp 16   Wt (!) 160.6 kg (354 lb 2 oz)   BMI 48.03 kg/m   Estimated body mass index is 48.03 kg/m  as calculated from the following:    Height as of 2/14/19: 1.829 m (6').    Weight as of this encounter: 160.6 kg (354 lb 2 oz).  Medication Reconciliation: complete    Reanna Cedeno LPN

## 2019-07-29 NOTE — PATIENT INSTRUCTIONS
Gradually wean off the Celebrex as able.     After about 1 month -- consider reducing dose of your Citalopram..... If able, okay to wean off.     Okay to use as little of the muscle relaxers as needed for pain.     Labs today.     Congratulations on the weight loss!!    Ultram / Tramadol refill printed today.     Return for Diabetes labs and clinic follow-up appointment every 3 to 4 months.  --- (Go for about 91 to 100 days)    Aspects of Diabetes we can improve:  Hemoglobin A1c Lab Results   Component Value Date    A1C 9.1 03/22/2019    A1C 9.1 12/05/2018    A1C 7.8 05/15/2018    Goal range is under 8. Best is 6.5 to 7   Blood Pressure 130/84 Goal to keep less than 140/90   Tobacco  reports that he has never smoked. He has never used smokeless tobacco. Goal to abstain from tobacco   Eye Exam -- Do Yearly -- Annual diabetic eye exam   Healthy weight Body mass index is 48.03 kg/m . Goal BMI under 30, best is under 25.      -- Trying to exercise daily (goal at least 20 min/day) with moderate aerobic activity   -- Eat healthy (resources from ADA at http://www.diabetes.org/)   -- Taking good care of my feet. Consider seeing the Podiatrist   -- Check blood sugars as directed, record in log book and bring to every appointment      Schedule lab only appointment --- A few days AFTER: 10/27/19   Schedule clinic appointment with Dr. Johnston -- Same day as labs, or 1-2 days later.     Insurance companies are now grading you and I on your blood sugar control -- Goal is to get your A1c down to 7.9% or lower and NO Smoking!    -- Medicare and most insurance companies, will only cover Hemoglobin A1c labs to be rechecked every 91+ days.      Hemoglobin A1C   Date Value Ref Range Status   03/22/2019 9.1 (H) 4.0 - 6.0 % Final   12/05/2018 9.1 (H) 4.0 - 6.0 % Final       Next follow-up appointment with Dr. Johnston should be scheduled:  -- Approximately a few days AFTER: 10/27/19

## 2019-07-30 ASSESSMENT — ANXIETY QUESTIONNAIRES: GAD7 TOTAL SCORE: 0

## 2019-12-11 ENCOUNTER — MYC MEDICAL ADVICE (OUTPATIENT)
Dept: INTERNAL MEDICINE | Facility: OTHER | Age: 49
End: 2019-12-11

## 2019-12-11 DIAGNOSIS — K59.09 INTERMITTENT CONSTIPATION: ICD-10-CM

## 2019-12-11 DIAGNOSIS — K62.5 BRBPR (BRIGHT RED BLOOD PER RECTUM): Primary | ICD-10-CM

## 2019-12-13 RX ORDER — POLYETHYLENE GLYCOL 3350 17 G/17G
1 POWDER, FOR SOLUTION ORAL DAILY
Qty: 578 G | Refills: 1 | Status: SHIPPED | OUTPATIENT
Start: 2019-12-13 | End: 2020-05-14

## 2019-12-13 NOTE — TELEPHONE ENCOUNTER
1. BRBPR (bright red blood per rectum)    It is not normal to pass blood from the rectum.      Recommend colonoscopy --there is a limit to the weight that a person can have, in order to get colonoscopy done at [facility].  This may need to be done in [place].    - GASTROENTEROLOGY ADULT REF PROCEDURE ONLY (?? [place], uncertain about BMI)    2. Intermittent constipation    START:     - polyethylene glycol (MIRALAX/GLYCOLAX) powder; Take 17 g (1 capful) by mouth daily - for constipation prevention  Dispense: 578 g; Refill: 1    Scott Johnston MD

## 2019-12-16 ENCOUNTER — TELEPHONE (OUTPATIENT)
Dept: SURGERY | Facility: OTHER | Age: 49
End: 2019-12-16

## 2019-12-16 NOTE — TELEPHONE ENCOUNTER
Patient referred by Dr. Johnston for a diagnostic colonoscopy ,  Diagnosis is rectal bleeding.  Please advise. Thank you. Rashmi Johnson on 12/16/2019 at 1:26 PM

## 2019-12-20 ENCOUNTER — TELEPHONE (OUTPATIENT)
Dept: INTERNAL MEDICINE | Facility: OTHER | Age: 49
End: 2019-12-20

## 2019-12-20 NOTE — TELEPHONE ENCOUNTER
Patient has been called and messages left to schedule colonoscopy on 12/17 12/18 and 12/19.  Letter has been sent for him to call and schedule.   Rashmi Johnson on 12/20/2019 at 12:38 PM

## 2019-12-28 ENCOUNTER — MYC MEDICAL ADVICE (OUTPATIENT)
Dept: INTERNAL MEDICINE | Facility: OTHER | Age: 49
End: 2019-12-28

## 2020-01-02 DIAGNOSIS — K62.5 RECTAL BLEEDING: Primary | ICD-10-CM

## 2020-01-02 NOTE — TELEPHONE ENCOUNTER
Screening Questions for the Scheduling of Screening Colonoscopies   (If Colonoscopy is diagnostic, Provider should review the chart before scheduling.)  Are you younger than 50 or older than 80?  YES   Do you take aspirin or fish oil?  NO  (if yes, tell patient to stop 1 week prior to Colonoscopy)  Do you take warfarin (Coumadin), clopidogrel (Plavix), apixaban (Eliquis), dabigatram (Pradaxa), rivaroxaban (Xarelto) or any blood thinner? NO   Do you use oxygen at home?  YES - CPAP  Do you have kidney disease? NO   Are you on dialysis? NO   Have you had a stroke or heart attack in the last year? NO   Have you had a stent in your heart or any blood vessel in the last year? NO   Have you had a transplant of any organ? NO   Have you had a colonoscopy or upper endoscopy (EGD) before? NO          When?    Date of scheduled Colonoscopy. 01/28/2020  Provider  [facility]   Pharmacy [facility]

## 2020-01-14 RX ORDER — POLYETHYLENE GLYCOL 3350, SODIUM CHLORIDE, SODIUM BICARBONATE, POTASSIUM CHLORIDE 420; 11.2; 5.72; 1.48 G/4L; G/4L; G/4L; G/4L
4000 POWDER, FOR SOLUTION ORAL ONCE
Qty: 4000 ML | Refills: 0 | Status: ON HOLD | OUTPATIENT
Start: 2020-01-14 | End: 2020-01-27

## 2020-01-14 RX ORDER — BISACODYL 5 MG/1
TABLET, DELAYED RELEASE ORAL
Qty: 2 TABLET | Refills: 0 | Status: ON HOLD | OUTPATIENT
Start: 2020-01-14 | End: 2020-01-27

## 2020-01-16 ENCOUNTER — OFFICE VISIT (OUTPATIENT)
Dept: INTERNAL MEDICINE | Facility: OTHER | Age: 50
End: 2020-01-16
Attending: INTERNAL MEDICINE
Payer: COMMERCIAL

## 2020-01-16 VITALS
HEART RATE: 77 BPM | BODY MASS INDEX: 48.42 KG/M2 | DIASTOLIC BLOOD PRESSURE: 88 MMHG | SYSTOLIC BLOOD PRESSURE: 122 MMHG | OXYGEN SATURATION: 96 % | WEIGHT: 315 LBS | RESPIRATION RATE: 18 BRPM | TEMPERATURE: 97.4 F

## 2020-01-16 DIAGNOSIS — E66.01 MORBID OBESITY WITH BMI OF 50.0-59.9, ADULT (H): ICD-10-CM

## 2020-01-16 DIAGNOSIS — G47.33 OSA ON CPAP: ICD-10-CM

## 2020-01-16 DIAGNOSIS — I10 BENIGN ESSENTIAL HYPERTENSION: ICD-10-CM

## 2020-01-16 DIAGNOSIS — Z12.5 SCREENING PSA (PROSTATE SPECIFIC ANTIGEN): ICD-10-CM

## 2020-01-16 DIAGNOSIS — Z80.42 FAMILY HX OF PROSTATE CANCER: ICD-10-CM

## 2020-01-16 DIAGNOSIS — E78.2 MIXED HYPERLIPIDEMIA: ICD-10-CM

## 2020-01-16 DIAGNOSIS — R39.15 URINARY URGENCY: ICD-10-CM

## 2020-01-16 PROBLEM — E11.9 CONTROLLED TYPE 2 DIABETES MELLITUS WITHOUT COMPLICATION, WITHOUT LONG-TERM CURRENT USE OF INSULIN (H): Status: ACTIVE | Noted: 2017-07-27

## 2020-01-16 LAB
ALBUMIN SERPL-MCNC: 4.5 G/DL (ref 3.5–5.7)
ALBUMIN UR-MCNC: NEGATIVE MG/DL
ALP SERPL-CCNC: 39 U/L (ref 34–104)
ALT SERPL W P-5'-P-CCNC: 54 U/L (ref 7–52)
ANION GAP SERPL CALCULATED.3IONS-SCNC: 8 MMOL/L (ref 3–14)
APPEARANCE UR: CLEAR
AST SERPL W P-5'-P-CCNC: 36 U/L (ref 13–39)
BILIRUB SERPL-MCNC: 0.8 MG/DL (ref 0.3–1)
BILIRUB UR QL STRIP: NEGATIVE
BUN SERPL-MCNC: 16 MG/DL (ref 7–25)
CALCIUM SERPL-MCNC: 9.9 MG/DL (ref 8.6–10.3)
CHLORIDE SERPL-SCNC: 99 MMOL/L (ref 98–107)
CHOLEST SERPL-MCNC: 254 MG/DL
CO2 SERPL-SCNC: 27 MMOL/L (ref 21–31)
COLOR UR AUTO: YELLOW
CREAT SERPL-MCNC: 0.95 MG/DL (ref 0.7–1.3)
ERYTHROCYTE [DISTWIDTH] IN BLOOD BY AUTOMATED COUNT: 14.6 % (ref 10–15)
GFR SERPL CREATININE-BSD FRML MDRD: 84 ML/MIN/{1.73_M2}
GLUCOSE SERPL-MCNC: 236 MG/DL (ref 70–105)
GLUCOSE UR STRIP-MCNC: 500 MG/DL
HBA1C MFR BLD: 10.2 % (ref 4–6)
HCT VFR BLD AUTO: 53.1 % (ref 40–53)
HDLC SERPL-MCNC: 38 MG/DL (ref 23–92)
HGB BLD-MCNC: 17.8 G/DL (ref 13.3–17.7)
HGB UR QL STRIP: NEGATIVE
KETONES UR STRIP-MCNC: NEGATIVE MG/DL
LDLC SERPL CALC-MCNC: ABNORMAL MG/DL
LEUKOCYTE ESTERASE UR QL STRIP: NEGATIVE
MCH RBC QN AUTO: 26.7 PG (ref 26.5–33)
MCHC RBC AUTO-ENTMCNC: 33.5 G/DL (ref 31.5–36.5)
MCV RBC AUTO: 80 FL (ref 78–100)
NITRATE UR QL: NEGATIVE
NONHDLC SERPL-MCNC: 216 MG/DL
PH UR STRIP: 5.5 PH (ref 5–7)
PLATELET # BLD AUTO: 170 10E9/L (ref 150–450)
POTASSIUM SERPL-SCNC: 4.4 MMOL/L (ref 3.5–5.1)
PROT SERPL-MCNC: 7.8 G/DL (ref 6.4–8.9)
PSA SERPL-ACNC: 1.27 NG/ML
RBC # BLD AUTO: 6.67 10E12/L (ref 4.4–5.9)
RBC #/AREA URNS AUTO: NORMAL /HPF
SODIUM SERPL-SCNC: 134 MMOL/L (ref 134–144)
SOURCE: ABNORMAL
SP GR UR STRIP: >1.03 (ref 1–1.03)
TRIGL SERPL-MCNC: 535 MG/DL
UROBILINOGEN UR STRIP-ACNC: 1 EU/DL (ref 0.2–1)
WBC # BLD AUTO: 6.5 10E9/L (ref 4–11)
WBC #/AREA URNS AUTO: NORMAL /HPF

## 2020-01-16 PROCEDURE — 82043 UR ALBUMIN QUANTITATIVE: CPT | Mod: ZL | Performed by: INTERNAL MEDICINE

## 2020-01-16 PROCEDURE — 80061 LIPID PANEL: CPT | Mod: ZL | Performed by: INTERNAL MEDICINE

## 2020-01-16 PROCEDURE — G0463 HOSPITAL OUTPT CLINIC VISIT: HCPCS

## 2020-01-16 PROCEDURE — 83036 HEMOGLOBIN GLYCOSYLATED A1C: CPT | Mod: ZL | Performed by: INTERNAL MEDICINE

## 2020-01-16 PROCEDURE — 84153 ASSAY OF PSA TOTAL: CPT | Mod: ZL | Performed by: INTERNAL MEDICINE

## 2020-01-16 PROCEDURE — 80053 COMPREHEN METABOLIC PANEL: CPT | Mod: ZL | Performed by: INTERNAL MEDICINE

## 2020-01-16 PROCEDURE — 36415 COLL VENOUS BLD VENIPUNCTURE: CPT | Mod: ZL | Performed by: INTERNAL MEDICINE

## 2020-01-16 PROCEDURE — G0103 PSA SCREENING: HCPCS | Mod: ZL | Performed by: INTERNAL MEDICINE

## 2020-01-16 PROCEDURE — 99214 OFFICE O/P EST MOD 30 MIN: CPT | Performed by: INTERNAL MEDICINE

## 2020-01-16 PROCEDURE — 85027 COMPLETE CBC AUTOMATED: CPT | Mod: ZL | Performed by: INTERNAL MEDICINE

## 2020-01-16 PROCEDURE — 81001 URINALYSIS AUTO W/SCOPE: CPT | Mod: ZL | Performed by: INTERNAL MEDICINE

## 2020-01-16 ASSESSMENT — ENCOUNTER SYMPTOMS
DYSURIA: 1
SHORTNESS OF BREATH: 0
CHILLS: 0
FEVER: 0
ROS GI COMMENTS: ABDOMINAL OBESITY
AGITATION: 0
COUGH: 0
EYE PAIN: 0
ARTHRALGIAS: 1
FREQUENCY: 1
CONSTIPATION: 1
WHEEZING: 0
FATIGUE: 0
MYALGIAS: 0
VOMITING: 0
HEADACHES: 1
CONFUSION: 0
BACK PAIN: 1
BLOOD IN STOOL: 1
NAUSEA: 0
HEMATURIA: 0
DIARRHEA: 0
DIZZINESS: 0
LIGHT-HEADEDNESS: 0
PALPITATIONS: 0
BRUISES/BLEEDS EASILY: 0
ABDOMINAL PAIN: 0

## 2020-01-16 ASSESSMENT — ANXIETY QUESTIONNAIRES
7. FEELING AFRAID AS IF SOMETHING AWFUL MIGHT HAPPEN: MORE THAN HALF THE DAYS
6. BECOMING EASILY ANNOYED OR IRRITABLE: SEVERAL DAYS
GAD7 TOTAL SCORE: 11
5. BEING SO RESTLESS THAT IT IS HARD TO SIT STILL: SEVERAL DAYS
2. NOT BEING ABLE TO STOP OR CONTROL WORRYING: NEARLY EVERY DAY
3. WORRYING TOO MUCH ABOUT DIFFERENT THINGS: SEVERAL DAYS
IF YOU CHECKED OFF ANY PROBLEMS ON THIS QUESTIONNAIRE, HOW DIFFICULT HAVE THESE PROBLEMS MADE IT FOR YOU TO DO YOUR WORK, TAKE CARE OF THINGS AT HOME, OR GET ALONG WITH OTHER PEOPLE: SOMEWHAT DIFFICULT
1. FEELING NERVOUS, ANXIOUS, OR ON EDGE: SEVERAL DAYS

## 2020-01-16 ASSESSMENT — PATIENT HEALTH QUESTIONNAIRE - PHQ9
SUM OF ALL RESPONSES TO PHQ QUESTIONS 1-9: 10
5. POOR APPETITE OR OVEREATING: MORE THAN HALF THE DAYS

## 2020-01-16 ASSESSMENT — PAIN SCALES - GENERAL: PAINLEVEL: NO PAIN (0)

## 2020-01-16 NOTE — H&P (VIEW-ONLY)
Nursing Notes:   Janey Chong LPN  1/16/2020  4:25 PM  Signed  Patient comes in for urinary urgency.  Janey Chong LPN ....................1/16/2020   4:20 PM  Chief Complaint   Patient presents with     Follow Up     urine       Initial /88 (BP Location: Right arm, Patient Position: Sitting, Cuff Size: Adult Large)   Pulse 77   Temp 97.4  F (36.3  C) (Tympanic)   Resp 18   Wt (!) 161.9 kg (357 lb)   SpO2 96%   BMI 48.42 kg/m    Estimated body mass index is 48.42 kg/m  as calculated from the following:    Height as of 2/14/19: 1.829 m (6').    Weight as of this encounter: 161.9 kg (357 lb).  Medication Reconciliation: complete    Janey Chong LPN    Nursing note reviewed with patient.  Accuracy and completeness verified.   Mr. Puentes is a 49 year old male who:  Patient presents with:  Follow Up: urine      ICD-10-CM    1. Uncontrolled type 2 diabetes mellitus without complication, without long-term current use of insulin (H) E11.65 Hemoglobin A1c     Albumin Random Urine Quantitative with Creat Ratio   2. Mixed hyperlipidemia E78.2 Lipid Profile   3. Morbid obesity with BMI of 50.0-59.9, adult (H) E66.01     Z68.43    4. GWEN on CPAP - uses nightly, finds helpful. Has been beneficial. currently 18 cm H20 G47.33     Z99.89    5. Family hx of prostate cancer Z80.42    6. Screening PSA (prostate specific antigen) Z12.5 PSA Screen GH     PSA Screen GH   7. Urinary urgency R39.15 UA reflex to Microscopic and Culture   8. Benign essential hypertension I10 Comprehensive metabolic panel     CBC with platelets     HPI  Patient comes in due to concerns of possible urinary Infection or bladder infection or prostate problems.  His wife sent him in today.  He has been having some urinary urgency and a bit of pressure in his lower abdomen/pelvis.  Check urinalysis and PSA.  Advised if labs are abnormal may need to see urology.    Uncontrolled type 2 diabetes, new diagnosis.  Last lab work showed  that his diabetes was under good control.  This is likely the cause of his urinary symptoms.  Needs to get blood sugars under better control.  He has been eating a lot more foods.  He is been gaining some weight.  Has been much less active during the winter months.  --Has been using Trulicity injections once weekly.    Mixed hyperlipidemia, cholesterol levels are very high.  Consider statin therapy.  He declined at his appointment again.  At this point we should consider medication initiation.  He hesitates due to all of the medications he is currently taking.    Morbid obesity, weight has gone up since his last visit.    Sleep apnea, uses CPAP nightly.  Finds helpful and beneficial.    Family history of prostate cancer, due to some urinary urgency, urinalysis and PSA check today.    Hypertension, blood pressures are currently controlled.  Check lab work.    Functional Capacity: > or about 4 METS.   Review of Systems   Constitutional: Negative for chills, fatigue and fever.   HENT: Negative for congestion and hearing loss.    Eyes: Negative for pain and visual disturbance.   Respiratory: Negative for cough, shortness of breath and wheezing.    Cardiovascular: Negative for chest pain and palpitations.   Gastrointestinal: Positive for blood in stool (3-4+ times,   while taking oral OTC constipation meds from [employer]) and constipation. Negative for abdominal pain, diarrhea, nausea and vomiting.        Abdominal obesity   Endocrine: Negative for cold intolerance and heat intolerance.   Genitourinary: Positive for dysuria, frequency and urgency. Negative for hematuria.   Musculoskeletal: Positive for arthralgias, back pain and gait problem. Negative for myalgias.        + multiple joint pain, hands/thumbs.    Skin: Negative for pallor.   Allergic/Immunologic: Negative for immunocompromised state.   Neurological: Positive for headaches. Negative for dizziness and light-headedness.   Hematological: Does not bruise/bleed  easily.   Psychiatric/Behavioral: Negative for agitation and confusion.      Problem List/PMH: reviewed in EMR, and made relevant updates today.  Medications: reviewed in EMR, and made relevant updates today.  Allergies: reviewed in EMR, and made relevant updates today.  I reviewed family and social history and made relevant updates today.  Social History     Tobacco Use     Smoking status: Never Smoker     Smokeless tobacco: Never Used   Substance Use Topics     Alcohol use: Yes     Alcohol/week: 0.0 standard drinks     Comment: Alcoholic Drinks/day: rarely     Drug use: No      Family History   Problem Relation Age of Onset     Other - See Comments Father         Psychiatric illness     Other - See Comments Paternal Grandfather         Psychiatric illness     Other - See Comments Paternal Uncle         Psychiatric illness       EXAM:   Vitals:    01/16/20 1617   BP: 122/88   BP Location: Right arm   Patient Position: Sitting   Cuff Size: Adult Large   Pulse: 77   Resp: 18   Temp: 97.4  F (36.3  C)   TempSrc: Tympanic   SpO2: 96%   Weight: (!) 161.9 kg (357 lb)       Current Pain Score: No Pain (0)     BP Readings from Last 3 Encounters:   01/16/20 122/88   07/29/19 130/84   03/22/19 132/86      Wt Readings from Last 3 Encounters:   01/16/20 (!) 161.9 kg (357 lb)   07/29/19 (!) 160.6 kg (354 lb 2 oz)   03/22/19 (!) 170.1 kg (375 lb)      Estimated body mass index is 48.42 kg/m  as calculated from the following:    Height as of 2/14/19: 1.829 m (6').    Weight as of this encounter: 161.9 kg (357 lb).     Physical Exam  Constitutional:       General: He is not in acute distress.     Appearance: He is well-developed. He is not diaphoretic.   HENT:      Head: Normocephalic and atraumatic.   Eyes:      General: No scleral icterus.     Conjunctiva/sclera: Conjunctivae normal.   Neck:      Musculoskeletal: Neck supple.      Thyroid: No thyromegaly.      Vascular: No carotid bruit.   Cardiovascular:      Rate and Rhythm:  Normal rate and regular rhythm.      Pulses: Normal pulses.   Pulmonary:      Effort: Pulmonary effort is normal. No respiratory distress.      Breath sounds: Normal breath sounds. No wheezing.   Abdominal:      Palpations: Abdomen is soft.      Tenderness: There is no abdominal tenderness.      Comments: + abdominal obesity   Musculoskeletal:         General: No tenderness or deformity.      Right lower leg: No edema.      Left lower leg: No edema.      Comments: + no CVA tenderness to percussion   Lymphadenopathy:      Cervical: No cervical adenopathy.   Skin:     General: Skin is warm and dry.      Findings: No rash.   Neurological:      General: No focal deficit present.      Mental Status: He is alert.      Cranial Nerves: No cranial nerve deficit.   Psychiatric:         Mood and Affect: Mood normal.         Behavior: Behavior normal.        Procedures   INVESTIGATIONS:  Results for orders placed or performed in visit on 01/16/20   UA reflex to Microscopic and Culture     Status: Abnormal   Result Value Ref Range    Color Urine Yellow     Appearance Urine Clear     Glucose Urine 500 (A) NEG^Negative mg/dL    Bilirubin Urine Negative NEG^Negative    Ketones Urine Negative NEG^Negative mg/dL    Specific Gravity Urine >1.030 1.003 - 1.035    Blood Urine Negative NEG^Negative    pH Urine 5.5 5.0 - 7.0 pH    Protein Albumin Urine Negative NEG^Negative mg/dL    Urobilinogen Urine 1.0 0.2 - 1.0 EU/dL    Nitrite Urine Negative NEG^Negative    Leukocyte Esterase Urine Negative NEG^Negative    Source Midstream Urine    PSA Screen GH     Status: None   Result Value Ref Range    PSA Screen 1.271 <3.100 ng/mL   Comprehensive metabolic panel     Status: Abnormal   Result Value Ref Range    Sodium 134 134 - 144 mmol/L    Potassium 4.4 3.5 - 5.1 mmol/L    Chloride 99 98 - 107 mmol/L    Carbon Dioxide 27 21 - 31 mmol/L    Anion Gap 8 3 - 14 mmol/L    Glucose 236 (H) 70 - 105 mg/dL    Urea Nitrogen 16 7 - 25 mg/dL    Creatinine  0.95 0.70 - 1.30 mg/dL    GFR Estimate 84 >60 mL/min/[1.73_m2]    GFR Estimate If Black >90 >60 mL/min/[1.73_m2]    Calcium 9.9 8.6 - 10.3 mg/dL    Bilirubin Total 0.8 0.3 - 1.0 mg/dL    Albumin 4.5 3.5 - 5.7 g/dL    Protein Total 7.8 6.4 - 8.9 g/dL    Alkaline Phosphatase 39 34 - 104 U/L    ALT 54 (H) 7 - 52 U/L    AST 36 13 - 39 U/L   CBC with platelets     Status: Abnormal   Result Value Ref Range    WBC 6.5 4.0 - 11.0 10e9/L    RBC Count 6.67 (H) 4.4 - 5.9 10e12/L    Hemoglobin 17.8 (H) 13.3 - 17.7 g/dL    Hematocrit 53.1 (H) 40.0 - 53.0 %    MCV 80 78 - 100 fl    MCH 26.7 26.5 - 33.0 pg    MCHC 33.5 31.5 - 36.5 g/dL    RDW 14.6 10.0 - 15.0 %    Platelet Count 170 150 - 450 10e9/L   Hemoglobin A1c     Status: Abnormal   Result Value Ref Range    Hemoglobin A1C 10.2 (H) 4.0 - 6.0 %   Lipid Profile     Status: Abnormal   Result Value Ref Range    Cholesterol 254 (H) <200 mg/dL    Triglycerides 535 (H) <150 mg/dL    HDL Cholesterol 38 23 - 92 mg/dL    LDL Cholesterol Calculated  <100 mg/dL     Cannot estimate LDL when triglyceride exceeds 400 mg/dL    Non HDL Cholesterol 216 (H) <130 mg/dL   Urine Microscopic     Status: None   Result Value Ref Range    WBC Urine 0 - 5 OTO5^0 - 5 /HPF    RBC Urine O - 2 OTO2^O - 2 /HPF       ASSESSMENT AND PLAN:  Problem List Items Addressed This Visit        Respiratory    GWEN on CPAP - uses nightly, finds helpful. Has been beneficial. currently 18 cm H20       Digestive    Morbid obesity with BMI of 50.0-59.9, adult (H)       Endocrine    Uncontrolled type 2 diabetes mellitus without complication, without long-term current use of insulin (H) - Primary    Mixed hyperlipidemia       Other    Family hx of prostate cancer      Other Visit Diagnoses     Screening PSA (prostate specific antigen)        Relevant Orders    PSA Screen GH (Completed)    Urinary urgency        Relevant Orders    UA reflex to Microscopic and Culture (Completed)    Benign essential hypertension             reviewed diet, exercise and weight control, cardiovascular risk and specific lipid/LDL goals reviewed, specific diabetic recommendations low cholesterol diet, weight control and daily exercise discussed, use of aspirin to prevent MI and TIA's discussed  -- Expected clinical course discussed    -- Medications and their side effects discussed    The 10-year ASCVD risk score (Liam COLLADO Jr., et al., 2013) is: 10.3%    Values used to calculate the score:      Age: 49 years      Sex: Male      Is Non- : No      Diabetic: Yes      Tobacco smoker: No      Systolic Blood Pressure: 122 mmHg      Is BP treated: No      HDL Cholesterol: 38 mg/dL      Total Cholesterol: 254 mg/dL    Patient Instructions     Labs today.     Call if you develop any worsening urinary issues -start/stop issues, weak stream, increased issues with night-time urination   -- call the clinic and request an appointment with urology - Dr. Cabrera.     To help with weight loss and improve blood sugar control....    -- Try to avoid Carbohydrates as much as possible -- breads, pasta, baked goods, cakes, oatmeal, cold cereal, potatoes.   These are turned to sugar in one metabolic conversion, cause insulin secretion and increased fat deposition / weight gain.      -- Eat more lean meats, proteins, eggs, nuts, vegetables.       You may want to consider enrolling in the [facility] Diabetes Prevention Program.   The program provides a supportive environment where participants work together in a small group led by a trained Lifestyle  in a classroom setting.     Education and meetings are held over a 12-month period, beginning with 16 weekly sessions followed by monthly maintenance meetings.     Research by the National Institutes of Health has proven that programs like the [facility] s Diabetes Prevention Program can reduce the number of cases of type 2 diabetes by 58%. The reduction was even greater, 71%, among adults aged 60 or older. The [facility] s  Diabetes Prevention Program is part of the Centers for Disease Control andPrevention-led National Diabetes Prevention Program and is nationally supported by the Diabetes Prevention and Control [place].     The focus is on small lifestyle changes that people can make to prevent diabetes.The groups have been successful in the past, with most participants reaching their goal of 7% weight loss and 150 minutes of activity each week.   The cost is Approximately $100 for each participant.     Please call KristinKlinefelter, MS, HONGN, LD at [phone] or email   [email or web address] if you are interested in getting moreinformation.      Return for Diabetes labs and clinic follow-up appointment every 3 to 4 months.  --- (Go for about 91 to 100 days)    Aspects of Diabetes we can improve:  Hemoglobin A1c Lab Results   Component Value Date    A1C 7.7 07/29/2019    A1C 9.1 03/22/2019    A1C 9.1 12/05/2018    A1C 7.8 05/15/2018    Goal range is under 8. Best is 6.5 to 7   Blood Pressure 122/88 Goal to keep less than 140/90   Tobacco  reports that he has never smoked. He has never used smokeless tobacco. Goal to abstain from tobacco   Eye Exam -- Do Yearly -- Annual diabetic eye exam   Healthy weight Body mass index is 48.42 kg/m . Goal BMI under 30, best is under 25.      -- Trying to exercise daily (goal at least 20 min/day) with moderate aerobic activity   -- Eat healthy (resources from ADA at http://www.diabetes.org/)   -- Taking good care of my feet. Consider seeing the Podiatrist   -- Check blood sugars as directed, record in log book and bring to every appointment      Schedule lab only appointment --- A few days AFTER: 4/15/20   Schedule clinic appointment with Dr. Johnston -- Same day as labs, or 1-2 days later.     Insurance companies are now grading you and I on your blood sugar control -- Goal is to get your A1c down to 7.9% or lower and NO Smoking!    -- Medicare and most insurance companies, will only cover  Hemoglobin A1c labs to be rechecked every 91+ days.      Hemoglobin A1C   Date Value Ref Range Status   07/29/2019 7.7 (H) 4.0 - 6.0 % Final   03/22/2019 9.1 (H) 4.0 - 6.0 % Final       Next follow-up appointment with Dr. Johnston should be scheduled:  -- Approximately a few days AFTER: 4/15/20      Immunization History   Administered Date(s) Administered     Influenza Vaccine IM > 6 months Valent IIV4 10/04/2017     TDAP Vaccine (Boostrix) 10/02/2014        -- Consider annual Influenza vaccination (Anytime now)     -- Pneumococcal PCV 13 shot (Anytime now).     -- Get your tetanus shot updated - from one of the local pharmacies, at your convenience --- in 2024.     -- At age 50 --> Get the new shingles shot (2 in series) - from one of the local pharmacies, at your convenience.     Pneumococcal Pneumonia vaccines (PCV 13 and PCV 23)     Pneumococcal Conjugate 13 - Valent Vaccine (One time only after age 65).     Pneumococcal 23 - Valent Vaccine -- Two doses (One before age 65 and One After)    -- repeat every 5 years in certain patient populations.     PCV 13 should be given prior to PCV 23 -- THEN -- In eight weeks or more, PCV 23 can be given.   If the patient has already received PCV 23, they should not receive PCV 13 for one year.      Scott Johnston MD   Internal Medicine  [facility]     Portions of this note were dictated using speech recognition software. The note has been proofread but errors in the text may have been overlooked. Please contact me if there are any concerns regarding the accuracy of the dictation.

## 2020-01-16 NOTE — PATIENT INSTRUCTIONS
Labs today.     Call if you develop any worsening urinary issues -start/stop issues, weak stream, increased issues with night-time urination   -- call the clinic and request an appointment with urology - Dr. Cabrera.     To help with weight loss and improve blood sugar control....    -- Try to avoid Carbohydrates as much as possible -- breads, pasta, baked goods, cakes, oatmeal, cold cereal, potatoes.   These are turned to sugar in one metabolic conversion, cause insulin secretion and increased fat deposition / weight gain.      -- Eat more lean meats, proteins, eggs, nuts, vegetables.       You may want to consider enrolling in the [facility] Diabetes Prevention Program.   The program provides a supportive environment where participants work together in a small group led by a trained Lifestyle  in a classroom setting.     Education and meetings are held over a 12-month period, beginning with 16 weekly sessions followed by monthly maintenance meetings.     Research by the National Institutes of Health has proven that programs like the [facility] s Diabetes Prevention Program can reduce the number of cases of type 2 diabetes by 58%. The reduction was even greater, 71%, among adults aged 60 or older. The [facility] s Diabetes Prevention Program is part of the Centers for Disease Control andPrevention-led National Diabetes Prevention Program and is nationally supported by the Diabetes Prevention and Control [place].     The focus is on small lifestyle changes that people can make to prevent diabetes.The groups have been successful in the past, with most participants reaching their goal of 7% weight loss and 150 minutes of activity each week.   The cost is Approximately $100 for each participant.     Please call KristinKlinefelter, MS, HONGN, LD at [phone] or email   [email or web address] if you are interested in getting moreinformation.      Return for Diabetes labs and clinic follow-up appointment every 3 to 4 months.  ---  (Go for about 91 to 100 days)    Aspects of Diabetes we can improve:  Hemoglobin A1c Lab Results   Component Value Date    A1C 7.7 07/29/2019    A1C 9.1 03/22/2019    A1C 9.1 12/05/2018    A1C 7.8 05/15/2018    Goal range is under 8. Best is 6.5 to 7   Blood Pressure 122/88 Goal to keep less than 140/90   Tobacco  reports that he has never smoked. He has never used smokeless tobacco. Goal to abstain from tobacco   Eye Exam -- Do Yearly -- Annual diabetic eye exam   Healthy weight Body mass index is 48.42 kg/m . Goal BMI under 30, best is under 25.      -- Trying to exercise daily (goal at least 20 min/day) with moderate aerobic activity   -- Eat healthy (resources from ADA at http://www.diabetes.org/)   -- Taking good care of my feet. Consider seeing the Podiatrist   -- Check blood sugars as directed, record in log book and bring to every appointment      Schedule lab only appointment --- A few days AFTER: 4/15/20   Schedule clinic appointment with Dr. Johnston -- Same day as labs, or 1-2 days later.     Insurance companies are now grading you and I on your blood sugar control -- Goal is to get your A1c down to 7.9% or lower and NO Smoking!    -- Medicare and most insurance companies, will only cover Hemoglobin A1c labs to be rechecked every 91+ days.      Hemoglobin A1C   Date Value Ref Range Status   07/29/2019 7.7 (H) 4.0 - 6.0 % Final   03/22/2019 9.1 (H) 4.0 - 6.0 % Final       Next follow-up appointment with Dr. Jonhston should be scheduled:  -- Approximately a few days AFTER: 4/15/20      Immunization History   Administered Date(s) Administered     Influenza Vaccine IM > 6 months Valent IIV4 10/04/2017     TDAP Vaccine (Boostrix) 10/02/2014        -- Consider annual Influenza vaccination (Anytime now)     -- Pneumococcal PCV 13 shot (Anytime now).     -- Get your tetanus shot updated - from one of the local pharmacies, at your convenience --- in 2024.     -- At age 50 --> Get the new shingles shot (2 in series)  - from one of the local pharmacies, at your convenience.     Pneumococcal Pneumonia vaccines (PCV 13 and PCV 23)     Pneumococcal Conjugate 13 - Valent Vaccine (One time only after age 65).     Pneumococcal 23 - Valent Vaccine -- Two doses (One before age 65 and One After)    -- repeat every 5 years in certain patient populations.     PCV 13 should be given prior to PCV 23 -- THEN -- In eight weeks or more, PCV 23 can be given.   If the patient has already received PCV 23, they should not receive PCV 13 for one year.

## 2020-01-16 NOTE — PROGRESS NOTES
Nursing Notes:   Janey Chong LPN  1/16/2020  4:25 PM  Signed  Patient comes in for urinary urgency.  Janey Chong LPN ....................1/16/2020   4:20 PM  Chief Complaint   Patient presents with     Follow Up     urine       Initial /88 (BP Location: Right arm, Patient Position: Sitting, Cuff Size: Adult Large)   Pulse 77   Temp 97.4  F (36.3  C) (Tympanic)   Resp 18   Wt (!) 161.9 kg (357 lb)   SpO2 96%   BMI 48.42 kg/m    Estimated body mass index is 48.42 kg/m  as calculated from the following:    Height as of 2/14/19: 1.829 m (6').    Weight as of this encounter: 161.9 kg (357 lb).  Medication Reconciliation: complete    Janey Chong LPN    Nursing note reviewed with patient.  Accuracy and completeness verified.   Mr. Puentes is a 49 year old male who:  Patient presents with:  Follow Up: urine      ICD-10-CM    1. Uncontrolled type 2 diabetes mellitus without complication, without long-term current use of insulin (H) E11.65 Hemoglobin A1c     Albumin Random Urine Quantitative with Creat Ratio   2. Mixed hyperlipidemia E78.2 Lipid Profile   3. Morbid obesity with BMI of 50.0-59.9, adult (H) E66.01     Z68.43    4. GWEN on CPAP - uses nightly, finds helpful. Has been beneficial. currently 18 cm H20 G47.33     Z99.89    5. Family hx of prostate cancer Z80.42    6. Screening PSA (prostate specific antigen) Z12.5 PSA Screen GH     PSA Screen GH   7. Urinary urgency R39.15 UA reflex to Microscopic and Culture   8. Benign essential hypertension I10 Comprehensive metabolic panel     CBC with platelets     HPI  Patient comes in due to concerns of possible urinary Infection or bladder infection or prostate problems.  His wife sent him in today.  He has been having some urinary urgency and a bit of pressure in his lower abdomen/pelvis.  Check urinalysis and PSA.  Advised if labs are abnormal may need to see urology.    Uncontrolled type 2 diabetes, new diagnosis.  Last lab work showed  that his diabetes was under good control.  This is likely the cause of his urinary symptoms.  Needs to get blood sugars under better control.  He has been eating a lot more foods.  He is been gaining some weight.  Has been much less active during the winter months.  --Has been using Trulicity injections once weekly.    Mixed hyperlipidemia, cholesterol levels are very high.  Consider statin therapy.  He declined at his appointment again.  At this point we should consider medication initiation.  He hesitates due to all of the medications he is currently taking.    Morbid obesity, weight has gone up since his last visit.    Sleep apnea, uses CPAP nightly.  Finds helpful and beneficial.    Family history of prostate cancer, due to some urinary urgency, urinalysis and PSA check today.    Hypertension, blood pressures are currently controlled.  Check lab work.    Functional Capacity: > or about 4 METS.   Review of Systems   Constitutional: Negative for chills, fatigue and fever.   HENT: Negative for congestion and hearing loss.    Eyes: Negative for pain and visual disturbance.   Respiratory: Negative for cough, shortness of breath and wheezing.    Cardiovascular: Negative for chest pain and palpitations.   Gastrointestinal: Positive for blood in stool (3-4+ times,   while taking oral OTC constipation meds from [facility]) and constipation. Negative for abdominal pain, diarrhea, nausea and vomiting.        Abdominal obesity   Endocrine: Negative for cold intolerance and heat intolerance.   Genitourinary: Positive for dysuria, frequency and urgency. Negative for hematuria.   Musculoskeletal: Positive for arthralgias, back pain and gait problem. Negative for myalgias.        + multiple joint pain, hands/thumbs.    Skin: Negative for pallor.   Allergic/Immunologic: Negative for immunocompromised state.   Neurological: Positive for headaches. Negative for dizziness and light-headedness.   Hematological: Does not bruise/bleed  easily.   Psychiatric/Behavioral: Negative for agitation and confusion.      Problem List/PMH: reviewed in EMR, and made relevant updates today.  Medications: reviewed in EMR, and made relevant updates today.  Allergies: reviewed in EMR, and made relevant updates today.  I reviewed family and social history and made relevant updates today.  Social History     Tobacco Use     Smoking status: Never Smoker     Smokeless tobacco: Never Used   Substance Use Topics     Alcohol use: Yes     Alcohol/week: 0.0 standard drinks     Comment: Alcoholic Drinks/day: rarely     Drug use: No      Family History   Problem Relation Age of Onset     Other - See Comments Father         Psychiatric illness     Other - See Comments Paternal Grandfather         Psychiatric illness     Other - See Comments Paternal Uncle         Psychiatric illness       EXAM:   Vitals:    01/16/20 1617   BP: 122/88   BP Location: Right arm   Patient Position: Sitting   Cuff Size: Adult Large   Pulse: 77   Resp: 18   Temp: 97.4  F (36.3  C)   TempSrc: Tympanic   SpO2: 96%   Weight: (!) 161.9 kg (357 lb)       Current Pain Score: No Pain (0)     BP Readings from Last 3 Encounters:   01/16/20 122/88   07/29/19 130/84   03/22/19 132/86      Wt Readings from Last 3 Encounters:   01/16/20 (!) 161.9 kg (357 lb)   07/29/19 (!) 160.6 kg (354 lb 2 oz)   03/22/19 (!) 170.1 kg (375 lb)      Estimated body mass index is 48.42 kg/m  as calculated from the following:    Height as of 2/14/19: 1.829 m (6').    Weight as of this encounter: 161.9 kg (357 lb).     Physical Exam  Constitutional:       General: He is not in acute distress.     Appearance: He is well-developed. He is not diaphoretic.   HENT:      Head: Normocephalic and atraumatic.   Eyes:      General: No scleral icterus.     Conjunctiva/sclera: Conjunctivae normal.   Neck:      Musculoskeletal: Neck supple.      Thyroid: No thyromegaly.      Vascular: No carotid bruit.   Cardiovascular:      Rate and Rhythm:  Normal rate and regular rhythm.      Pulses: Normal pulses.   Pulmonary:      Effort: Pulmonary effort is normal. No respiratory distress.      Breath sounds: Normal breath sounds. No wheezing.   Abdominal:      Palpations: Abdomen is soft.      Tenderness: There is no abdominal tenderness.      Comments: + abdominal obesity   Musculoskeletal:         General: No tenderness or deformity.      Right lower leg: No edema.      Left lower leg: No edema.      Comments: + no CVA tenderness to percussion   Lymphadenopathy:      Cervical: No cervical adenopathy.   Skin:     General: Skin is warm and dry.      Findings: No rash.   Neurological:      General: No focal deficit present.      Mental Status: He is alert.      Cranial Nerves: No cranial nerve deficit.   Psychiatric:         Mood and Affect: Mood normal.         Behavior: Behavior normal.        Procedures   INVESTIGATIONS:  Results for orders placed or performed in visit on 01/16/20   UA reflex to Microscopic and Culture     Status: Abnormal   Result Value Ref Range    Color Urine Yellow     Appearance Urine Clear     Glucose Urine 500 (A) NEG^Negative mg/dL    Bilirubin Urine Negative NEG^Negative    Ketones Urine Negative NEG^Negative mg/dL    Specific Gravity Urine >1.030 1.003 - 1.035    Blood Urine Negative NEG^Negative    pH Urine 5.5 5.0 - 7.0 pH    Protein Albumin Urine Negative NEG^Negative mg/dL    Urobilinogen Urine 1.0 0.2 - 1.0 EU/dL    Nitrite Urine Negative NEG^Negative    Leukocyte Esterase Urine Negative NEG^Negative    Source Midstream Urine    PSA Screen GH     Status: None   Result Value Ref Range    PSA Screen 1.271 <3.100 ng/mL   Comprehensive metabolic panel     Status: Abnormal   Result Value Ref Range    Sodium 134 134 - 144 mmol/L    Potassium 4.4 3.5 - 5.1 mmol/L    Chloride 99 98 - 107 mmol/L    Carbon Dioxide 27 21 - 31 mmol/L    Anion Gap 8 3 - 14 mmol/L    Glucose 236 (H) 70 - 105 mg/dL    Urea Nitrogen 16 7 - 25 mg/dL    Creatinine  0.95 0.70 - 1.30 mg/dL    GFR Estimate 84 >60 mL/min/[1.73_m2]    GFR Estimate If Black >90 >60 mL/min/[1.73_m2]    Calcium 9.9 8.6 - 10.3 mg/dL    Bilirubin Total 0.8 0.3 - 1.0 mg/dL    Albumin 4.5 3.5 - 5.7 g/dL    Protein Total 7.8 6.4 - 8.9 g/dL    Alkaline Phosphatase 39 34 - 104 U/L    ALT 54 (H) 7 - 52 U/L    AST 36 13 - 39 U/L   CBC with platelets     Status: Abnormal   Result Value Ref Range    WBC 6.5 4.0 - 11.0 10e9/L    RBC Count 6.67 (H) 4.4 - 5.9 10e12/L    Hemoglobin 17.8 (H) 13.3 - 17.7 g/dL    Hematocrit 53.1 (H) 40.0 - 53.0 %    MCV 80 78 - 100 fl    MCH 26.7 26.5 - 33.0 pg    MCHC 33.5 31.5 - 36.5 g/dL    RDW 14.6 10.0 - 15.0 %    Platelet Count 170 150 - 450 10e9/L   Hemoglobin A1c     Status: Abnormal   Result Value Ref Range    Hemoglobin A1C 10.2 (H) 4.0 - 6.0 %   Lipid Profile     Status: Abnormal   Result Value Ref Range    Cholesterol 254 (H) <200 mg/dL    Triglycerides 535 (H) <150 mg/dL    HDL Cholesterol 38 23 - 92 mg/dL    LDL Cholesterol Calculated  <100 mg/dL     Cannot estimate LDL when triglyceride exceeds 400 mg/dL    Non HDL Cholesterol 216 (H) <130 mg/dL   Urine Microscopic     Status: None   Result Value Ref Range    WBC Urine 0 - 5 OTO5^0 - 5 /HPF    RBC Urine O - 2 OTO2^O - 2 /HPF       ASSESSMENT AND PLAN:  Problem List Items Addressed This Visit        Respiratory    GWEN on CPAP - uses nightly, finds helpful. Has been beneficial. currently 18 cm H20       Digestive    Morbid obesity with BMI of 50.0-59.9, adult (H)       Endocrine    Uncontrolled type 2 diabetes mellitus without complication, without long-term current use of insulin (H) - Primary    Mixed hyperlipidemia       Other    Family hx of prostate cancer      Other Visit Diagnoses     Screening PSA (prostate specific antigen)        Relevant Orders    PSA Screen GH (Completed)    Urinary urgency        Relevant Orders    UA reflex to Microscopic and Culture (Completed)    Benign essential hypertension             reviewed diet, exercise and weight control, cardiovascular risk and specific lipid/LDL goals reviewed, specific diabetic recommendations low cholesterol diet, weight control and daily exercise discussed, use of aspirin to prevent MI and TIA's discussed  -- Expected clinical course discussed    -- Medications and their side effects discussed    The 10-year ASCVD risk score (Liam COLLADO Jr., et al., 2013) is: 10.3%    Values used to calculate the score:      Age: 49 years      Sex: Male      Is Non- : No      Diabetic: Yes      Tobacco smoker: No      Systolic Blood Pressure: 122 mmHg      Is BP treated: No      HDL Cholesterol: 38 mg/dL      Total Cholesterol: 254 mg/dL    Patient Instructions     Labs today.     Call if you develop any worsening urinary issues -start/stop issues, weak stream, increased issues with night-time urination   -- call the clinic and request an appointment with urology - Dr. Cabrera.     To help with weight loss and improve blood sugar control....    -- Try to avoid Carbohydrates as much as possible -- breads, pasta, baked goods, cakes, oatmeal, cold cereal, potatoes.   These are turned to sugar in one metabolic conversion, cause insulin secretion and increased fat deposition / weight gain.      -- Eat more lean meats, proteins, eggs, nuts, vegetables.       You may want to consider enrolling in the [facility] Diabetes Prevention Program.   The program provides a supportive environment where participants work together in a small group led by a trained Lifestyle  in a classroom setting.     Education and meetings are held over a 12-month period, beginning with 16 weekly sessions followed by monthly maintenance meetings.     Research by the National Institutes of Health has proven that programs like the [facility] s Diabetes Prevention Program can reduce the number of cases of type 2 diabetes by 58%. The reduction was even greater, 71%, among adults aged 60 or older. The [facility] s  Diabetes Prevention Program is part of the Centers for Disease Control andPrevention-led National Diabetes Prevention Program and is nationally supported by the Diabetes Prevention and Control [place].     The focus is on small lifestyle changes that people can make to prevent diabetes.The groups have been successful in the past, with most participants reaching their goal of 7% weight loss and 150 minutes of activity each week.   The cost is Approximately $100 for each participant.     Please call KristinKlinefelter, MS, HONGN, LD at [phone] or email   [email or web address] if you are interested in getting moreinformation.      Return for Diabetes labs and clinic follow-up appointment every 3 to 4 months.  --- (Go for about 91 to 100 days)    Aspects of Diabetes we can improve:  Hemoglobin A1c Lab Results   Component Value Date    A1C 7.7 07/29/2019    A1C 9.1 03/22/2019    A1C 9.1 12/05/2018    A1C 7.8 05/15/2018    Goal range is under 8. Best is 6.5 to 7   Blood Pressure 122/88 Goal to keep less than 140/90   Tobacco  reports that he has never smoked. He has never used smokeless tobacco. Goal to abstain from tobacco   Eye Exam -- Do Yearly -- Annual diabetic eye exam   Healthy weight Body mass index is 48.42 kg/m . Goal BMI under 30, best is under 25.      -- Trying to exercise daily (goal at least 20 min/day) with moderate aerobic activity   -- Eat healthy (resources from ADA at http://www.diabetes.org/)   -- Taking good care of my feet. Consider seeing the Podiatrist   -- Check blood sugars as directed, record in log book and bring to every appointment      Schedule lab only appointment --- A few days AFTER: 4/15/20   Schedule clinic appointment with Dr. Johnston -- Same day as labs, or 1-2 days later.     Insurance companies are now grading you and I on your blood sugar control -- Goal is to get your A1c down to 7.9% or lower and NO Smoking!    -- Medicare and most insurance companies, will only cover  Hemoglobin A1c labs to be rechecked every 91+ days.      Hemoglobin A1C   Date Value Ref Range Status   07/29/2019 7.7 (H) 4.0 - 6.0 % Final   03/22/2019 9.1 (H) 4.0 - 6.0 % Final       Next follow-up appointment with Dr. Johnston should be scheduled:  -- Approximately a few days AFTER: 4/15/20      Immunization History   Administered Date(s) Administered     Influenza Vaccine IM > 6 months Valent IIV4 10/04/2017     TDAP Vaccine (Boostrix) 10/02/2014        -- Consider annual Influenza vaccination (Anytime now)     -- Pneumococcal PCV 13 shot (Anytime now).     -- Get your tetanus shot updated - from one of the local pharmacies, at your convenience --- in 2024.     -- At age 50 --> Get the new shingles shot (2 in series) - from one of the local pharmacies, at your convenience.     Pneumococcal Pneumonia vaccines (PCV 13 and PCV 23)     Pneumococcal Conjugate 13 - Valent Vaccine (One time only after age 65).     Pneumococcal 23 - Valent Vaccine -- Two doses (One before age 65 and One After)    -- repeat every 5 years in certain patient populations.     PCV 13 should be given prior to PCV 23 -- THEN -- In eight weeks or more, PCV 23 can be given.   If the patient has already received PCV 23, they should not receive PCV 13 for one year.      Scott Johnston MD   Internal Medicine  [facility]     Portions of this note were dictated using speech recognition software. The note has been proofread but errors in the text may have been overlooked. Please contact me if there are any concerns regarding the accuracy of the dictation.

## 2020-01-16 NOTE — NURSING NOTE
Patient comes in for urinary urgency.  Janey Chong LPN ....................1/16/2020   4:20 PM  Chief Complaint   Patient presents with     Follow Up     urine       Initial /88 (BP Location: Right arm, Patient Position: Sitting, Cuff Size: Adult Large)   Pulse 77   Temp 97.4  F (36.3  C) (Tympanic)   Resp 18   Wt (!) 161.9 kg (357 lb)   SpO2 96%   BMI 48.42 kg/m   Estimated body mass index is 48.42 kg/m  as calculated from the following:    Height as of 2/14/19: 1.829 m (6').    Weight as of this encounter: 161.9 kg (357 lb).  Medication Reconciliation: complete    Janey Chong LPN

## 2020-01-16 NOTE — LETTER
January 16, 2020      [facility] [place]  [address] [place] [address]        Dear ,    We are writing to inform you of your test results.    Urinalysis does not have any infection.    PSA/prostate cancer lab is normal.    Your diabetes is out of control.  Hemoglobin A1c is greater than 7.9%.      Your blood sugar is very high, this is causing sugar to go into your urine.  The high sugar in your urine is likely contributing to some urinary symptoms.    Your cholesterol levels are very high.    Resulted Orders   UA reflex to Microscopic and Culture   Result Value Ref Range    Color Urine Yellow     Appearance Urine Clear     Glucose Urine 500 (A) NEG^Negative mg/dL    Bilirubin Urine Negative NEG^Negative    Ketones Urine Negative NEG^Negative mg/dL    Specific Gravity Urine >1.030 1.003 - 1.035    Blood Urine Negative NEG^Negative    pH Urine 5.5 5.0 - 7.0 pH    Protein Albumin Urine Negative NEG^Negative mg/dL    Urobilinogen Urine 1.0 0.2 - 1.0 EU/dL    Nitrite Urine Negative NEG^Negative    Leukocyte Esterase Urine Negative NEG^Negative    Source Midstream Urine    PSA Screen GH   Result Value Ref Range    PSA Screen 1.271 <3.100 ng/mL      Comment:      The DXI Access PSAS WHO assay is a two site immunoenzymatic assay. Assay   values obtained with different assay methods cannot be used interchangeably   due to differences in assay methods and reagent specificity.     Comprehensive metabolic panel   Result Value Ref Range    Sodium 134 134 - 144 mmol/L    Potassium 4.4 3.5 - 5.1 mmol/L    Chloride 99 98 - 107 mmol/L    Carbon Dioxide 27 21 - 31 mmol/L    Anion Gap 8 3 - 14 mmol/L    Glucose 236 (H) 70 - 105 mg/dL    Urea Nitrogen 16 7 - 25 mg/dL    Creatinine 0.95 0.70 - 1.30 mg/dL    GFR Estimate 84 >60 mL/min/[1.73_m2]    GFR Estimate If Black >90 >60 mL/min/[1.73_m2]    Calcium 9.9 8.6 - 10.3 mg/dL    Bilirubin Total 0.8 0.3 - 1.0 mg/dL    Albumin 4.5 3.5 - 5.7 g/dL    Protein  Total 7.8 6.4 - 8.9 g/dL    Alkaline Phosphatase 39 34 - 104 U/L    ALT 54 (H) 7 - 52 U/L    AST 36 13 - 39 U/L   CBC with platelets   Result Value Ref Range    WBC 6.5 4.0 - 11.0 10e9/L    RBC Count 6.67 (H) 4.4 - 5.9 10e12/L    Hemoglobin 17.8 (H) 13.3 - 17.7 g/dL    Hematocrit 53.1 (H) 40.0 - 53.0 %    MCV 80 78 - 100 fl    MCH 26.7 26.5 - 33.0 pg    MCHC 33.5 31.5 - 36.5 g/dL    RDW 14.6 10.0 - 15.0 %    Platelet Count 170 150 - 450 10e9/L   Hemoglobin A1c   Result Value Ref Range    Hemoglobin A1C 10.2 (H) 4.0 - 6.0 %   Lipid Profile   Result Value Ref Range    Cholesterol 254 (H) <200 mg/dL    Triglycerides 535 (H) <150 mg/dL      Comment:      Borderline high:  150-199 mg/dl  High:             200-499 mg/dl  Very high:       >499 mg/dl      HDL Cholesterol 38 23 - 92 mg/dL    LDL Cholesterol Calculated  <100 mg/dL     Cannot estimate LDL when triglyceride exceeds 400 mg/dL      Comment:      Above desirable:  100-129 mg/dl  Borderline High:  130-159 mg/dL  High:             160-189 mg/dL  Very high:       >189 mg/dl      Non HDL Cholesterol 216 (H) <130 mg/dL      Comment:      Above Desirable:  130-159 mg/dl  Borderline high:  160-189 mg/dl  High:             190-219 mg/dl  Very high:       >219 mg/dl     Urine Microscopic   Result Value Ref Range    WBC Urine 0 - 5 OTO5^0 - 5 /HPF    RBC Urine O - 2 OTO2^O - 2 /HPF       If you have any questions or concerns, please call the clinic at the number listed above.       Sincerely,        Scott Johnston MD

## 2020-01-17 LAB
CREAT UR-MCNC: 238 MG/DL
MICROALBUMIN UR-MCNC: 10 MG/L
MICROALBUMIN/CREAT UR: 4.41 MG/G CR (ref 0–17)

## 2020-01-17 ASSESSMENT — ANXIETY QUESTIONNAIRES: GAD7 TOTAL SCORE: 11

## 2020-01-21 RX ORDER — OMEGA-3 FATTY ACIDS/FISH OIL 300-1000MG
1 CAPSULE ORAL DAILY
COMMUNITY

## 2020-01-27 ENCOUNTER — HOSPITAL ENCOUNTER (OUTPATIENT)
Facility: OTHER | Age: 50
Discharge: HOME | End: 2020-01-27
Attending: SURGERY | Admitting: SURGERY
Payer: COMMERCIAL

## 2020-01-27 ENCOUNTER — ANESTHESIA (OUTPATIENT)
Dept: SURGERY | Facility: OTHER | Age: 50
End: 2020-01-27
Payer: COMMERCIAL

## 2020-01-27 ENCOUNTER — ANESTHESIA EVENT (OUTPATIENT)
Dept: SURGERY | Facility: OTHER | Age: 50
End: 2020-01-27
Payer: COMMERCIAL

## 2020-01-27 VITALS
TEMPERATURE: 97.3 F | BODY MASS INDEX: 48.42 KG/M2 | HEART RATE: 72 BPM | WEIGHT: 315 LBS | OXYGEN SATURATION: 95 % | RESPIRATION RATE: 20 BRPM | SYSTOLIC BLOOD PRESSURE: 107 MMHG | DIASTOLIC BLOOD PRESSURE: 69 MMHG

## 2020-01-27 PROCEDURE — 45385 COLONOSCOPY W/LESION REMOVAL: CPT | Performed by: NURSE ANESTHETIST, CERTIFIED REGISTERED

## 2020-01-27 PROCEDURE — 46221 LIGATION OF HEMORRHOID(S): CPT

## 2020-01-27 PROCEDURE — 25000125 ZZHC RX 250: Performed by: NURSE ANESTHETIST, CERTIFIED REGISTERED

## 2020-01-27 PROCEDURE — 46221 LIGATION OF HEMORRHOID(S): CPT | Performed by: SURGERY

## 2020-01-27 PROCEDURE — 40000010 ZZH STATISTIC ANES STAT CODE-CRNA PER MINUTE: Performed by: SURGERY

## 2020-01-27 PROCEDURE — 25800030 ZZH RX IP 258 OP 636: Performed by: SURGERY

## 2020-01-27 PROCEDURE — 25000128 H RX IP 250 OP 636: Performed by: NURSE ANESTHETIST, CERTIFIED REGISTERED

## 2020-01-27 PROCEDURE — 88305 TISSUE EXAM BY PATHOLOGIST: CPT

## 2020-01-27 PROCEDURE — 45385 COLONOSCOPY W/LESION REMOVAL: CPT | Performed by: SURGERY

## 2020-01-27 PROCEDURE — 45380 COLONOSCOPY AND BIOPSY: CPT | Performed by: SURGERY

## 2020-01-27 RX ORDER — PROPOFOL 10 MG/ML
INJECTION, EMULSION INTRAVENOUS PRN
Status: DISCONTINUED | OUTPATIENT
Start: 2020-01-27 | End: 2020-01-27

## 2020-01-27 RX ORDER — SODIUM CHLORIDE, SODIUM LACTATE, POTASSIUM CHLORIDE, CALCIUM CHLORIDE 600; 310; 30; 20 MG/100ML; MG/100ML; MG/100ML; MG/100ML
INJECTION, SOLUTION INTRAVENOUS CONTINUOUS
Status: DISCONTINUED | OUTPATIENT
Start: 2020-01-27 | End: 2020-01-27 | Stop reason: HOSPADM

## 2020-01-27 RX ORDER — ONDANSETRON 2 MG/ML
4 INJECTION INTRAMUSCULAR; INTRAVENOUS
Status: DISCONTINUED | OUTPATIENT
Start: 2020-01-27 | End: 2020-01-27 | Stop reason: HOSPADM

## 2020-01-27 RX ORDER — PROPOFOL 10 MG/ML
INJECTION, EMULSION INTRAVENOUS CONTINUOUS PRN
Status: DISCONTINUED | OUTPATIENT
Start: 2020-01-27 | End: 2020-01-27

## 2020-01-27 RX ORDER — LIDOCAINE 40 MG/G
CREAM TOPICAL
Status: DISCONTINUED | OUTPATIENT
Start: 2020-01-27 | End: 2020-01-27 | Stop reason: HOSPADM

## 2020-01-27 RX ORDER — LIDOCAINE HYDROCHLORIDE 20 MG/ML
INJECTION, SOLUTION INFILTRATION; PERINEURAL PRN
Status: DISCONTINUED | OUTPATIENT
Start: 2020-01-27 | End: 2020-01-27

## 2020-01-27 RX ORDER — NALOXONE HYDROCHLORIDE 0.4 MG/ML
.1-.4 INJECTION, SOLUTION INTRAMUSCULAR; INTRAVENOUS; SUBCUTANEOUS
Status: DISCONTINUED | OUTPATIENT
Start: 2020-01-27 | End: 2020-01-27 | Stop reason: HOSPADM

## 2020-01-27 RX ORDER — FLUMAZENIL 0.1 MG/ML
0.2 INJECTION, SOLUTION INTRAVENOUS
Status: DISCONTINUED | OUTPATIENT
Start: 2020-01-27 | End: 2020-01-27 | Stop reason: HOSPADM

## 2020-01-27 RX ADMIN — PROPOFOL 100 MG: 10 INJECTION, EMULSION INTRAVENOUS at 09:24

## 2020-01-27 RX ADMIN — SODIUM CHLORIDE, POTASSIUM CHLORIDE, SODIUM LACTATE AND CALCIUM CHLORIDE: 600; 310; 30; 20 INJECTION, SOLUTION INTRAVENOUS at 08:35

## 2020-01-27 RX ADMIN — LIDOCAINE HYDROCHLORIDE 80 MG: 20 INJECTION, SOLUTION INFILTRATION; PERINEURAL at 09:24

## 2020-01-27 RX ADMIN — PROPOFOL 100 MCG/KG/MIN: 10 INJECTION, EMULSION INTRAVENOUS at 09:25

## 2020-01-27 NOTE — ANESTHESIA PREPROCEDURE EVALUATION
Anesthesia Pre-Procedure Evaluation    Patient: Shelton Puentes   MRN: [id number] : 1970          Preoperative Diagnosis: Rectal bleeding [K62.5]    Procedure(s):  COLONOSCOPY    Past Medical History:   Diagnosis Date     Achilles tendinitis of left lower extremity     2016     Dorsalgia     No Comments Provided     Obstructive sleep apnea     using nasal pillows; sleeping 8 hours     Other specified anxiety disorders     No Comments Provided     Polyosteoarthritis     has had injections     Past Surgical History:   Procedure Laterality Date     [facility] joint replacement       [facility]      TONSILLECTOMY      as a child       Anesthesia Evaluation     . Pt has had prior anesthetic. Type: General and MAC           ROS/MED HX    ENT/Pulmonary:     (+)sleep apnea, , . .    Neurologic:  - neg neurologic ROS     Cardiovascular:     (+) Dyslipidemia, ----. : . . . :. .       METS/Exercise Tolerance:  3 - Able to walk 1-2 blocks without stopping   Hematologic:  - neg hematologic  ROS       Musculoskeletal:   (+) arthritis,  -       GI/Hepatic:  - neg GI/hepatic ROS       Renal/Genitourinary:     (+) chronic renal disease,       Endo: Comment: BMI>48    (+) type I DM, Obesity, .      Psychiatric:     (+) psychiatric history anxiety and depression      Infectious Disease:  - neg infectious disease ROS       Malignancy:      - no malignancy   Other:    - neg other ROS                      Physical Exam  Normal systems: cardiovascular, pulmonary and dental    Airway   Mallampati: III  TM distance: >3 FB  Neck ROM: limited    Dental     Cardiovascular   Rhythm and rate: regular and normal      Pulmonary    breath sounds clear to auscultation            Lab Results   Component Value Date    WBC 6.5 2020    HGB 17.8 (H) 2020    HCT 53.1 (H) 2020     2020    SED 10 2015     2020    POTASSIUM 4.4 2020    CHLORIDE 99 2020    CO2 27  01/16/2020    BUN 16 01/16/2020    CR 0.95 01/16/2020     (H) 01/16/2020    HOWARD 9.9 01/16/2020    ALBUMIN 4.5 01/16/2020    PROTTOTAL 7.8 01/16/2020    ALT 54 (H) 01/16/2020    AST 36 01/16/2020    ALKPHOS 39 01/16/2020    BILITOTAL 0.8 01/16/2020       Preop Vitals  BP Readings from Last 3 Encounters:   01/27/20 134/88   01/16/20 122/88   07/29/19 130/84    Pulse Readings from Last 3 Encounters:   01/27/20 75   01/16/20 77   07/29/19 84      Resp Readings from Last 3 Encounters:   01/27/20 20   01/16/20 18   07/29/19 16    SpO2 Readings from Last 3 Encounters:   01/27/20 95%   01/16/20 96%   02/21/18 96%      Temp Readings from Last 1 Encounters:   01/27/20 97.3  F (36.3  C) (Tympanic)    Ht Readings from Last 1 Encounters:   02/14/19 1.829 m (6')      Wt Readings from Last 1 Encounters:   01/27/20 (!) 161.9 kg (357 lb)    Estimated body mass index is 48.42 kg/m  as calculated from the following:    Height as of 2/14/19: 1.829 m (6').    Weight as of this encounter: 161.9 kg (357 lb).       Anesthesia Plan      History & Physical Review      ASA Status:  3 .    NPO Status:  > 8 hours    Plan for MAC          Postoperative Care      Consents  Anesthetic plan, risks, benefits and alternatives discussed with:  Patient..                 David Kellerman, APRN CRNA

## 2020-01-27 NOTE — INTERVAL H&P NOTE
I saw and examined Shelton Puentes.  I have reviewed the history and physical and find no changes to the patient's medical status or condition with the exceptions noted below.     Fernandez José MD   9:09 AM 1/27/2020

## 2020-01-27 NOTE — ANESTHESIA POSTPROCEDURE EVALUATION
Patient: Shelton Puentes    Procedure(s):  COLONOSCOPY, WITH POLYPECTOMY AND BIOPSY  Hemorrhoidectomy banding    Diagnosis:Rectal bleeding [K62.5]  Diagnosis Additional Information: No value filed.    Anesthesia Type:  MAC    Note:  Anesthesia Post Evaluation    Patient location during evaluation: Phase 2  Patient participation: Able to fully participate in evaluation  Level of consciousness: awake and alert  Pain management: adequate  Airway patency: patent  Cardiovascular status: acceptable  Respiratory status: acceptable  Hydration status: acceptable  PONV: none     Anesthetic complications: None          Last vitals:  Vitals:    01/27/20 0814   BP: 134/88   Pulse: 75   Resp: 20   Temp: 97.3  F (36.3  C)   SpO2: 95%         Electronically Signed By: CLAUDIA GUY CRNA  January 27, 2020  10:15 AM

## 2020-01-27 NOTE — OP NOTE
PROCEDURE NOTE    SURGEON:Fernandez José MD    PRE-OP DIAGNOSIS:  Rectal bleeding      POST-OP DIAGNOSIS: Hemorrhoids, diverticula, ascending polyps, transverse polyp, sigmoid polyps.     PROCEDURE:  Colonoscopy with cold snare, anoscopy with hemorrhoid banding    SPECIMEN:      ID Type Source Tests Collected by Time Destination   A :  Polyp Large Intestine, Transverse SURGICAL PATHOLOGY EXAM Fernandez José MD 1/27/2020  9:33 AM    B :  Polyp Large Intestine, Right/Ascending SURGICAL PATHOLOGY EXAM Fernandez José MD 1/27/2020  9:38 AM    C :  Polyp Large Intestine, Sigmoid SURGICAL PATHOLOGY EXAM Fernandez José MD 1/27/2020  9:45 AM          ANESTHESIA:  Monitor Anesthesia Care CRNA Independent: Michoacano Knight APRN CRNA   Coverage requested due to GWEN    ESTIMATED BLOOD LOSS: none    COMPLICATIONS:  None    INDICATION FOR THE PROCEDURE: The patient is a 49 year old male. The patient presents with rectal bleeding. I explained to the patient the risks, benefits and alternatives to screening colonoscopy for evaluating for cancer or polyps/ ectal bleeding. We discussed the risks including bleeding, perforation, potential inability to reach the cecum and the risks of sedation. The patient's questions were answered and the patient wished to proceed. Informed consent paperwork was completed.    PROCEDURE: The patient was taken to the endoscopy suite. Appropriate monitors were attached. The patient was placed in the left lateral decubitus position. Timeout was performed confirming the patient's identity and procedure to be performed.  After appropriate sedation was confirmed, digital rectal exam was performed.  There was normal tone and no gross abnormality was noted.  The lubricated colonoscope was introduced into the anus the colon was insufflated with air. The prep quality was adequate. Under direct visualization the scope was advanced to the cecum. The mucosa of the colon was inspected while withdrawing the  scope. Diverticuli were noted. Cold snare was used on ascending polyps X2 (5-6mm), transvers polyp (6mm), sigmoid polyps X 7 (2-7mm). The scope was retroflexed in the rectum and the anorectal junction was inspected. Hemorrhoids were noted. The scope was returned to a neutral position and the colon was decompressed. The scope was removed. An anoscope was inserted and 4 columns of hemorrhoids were banded.  The patient tolerated the procedure with no immediately apparent complication. The patient was taken to recovery in stable condition.    FOLLOW UP: RECOMMEND high fiber diet, will call with pathology results.     Fernandez José MD on 1/27/2020 at 10:04 AM

## 2020-01-30 ENCOUNTER — TELEPHONE (OUTPATIENT)
Dept: SURGERY | Facility: OTHER | Age: 50
End: 2020-01-30

## 2020-02-11 ENCOUNTER — TELEPHONE (OUTPATIENT)
Dept: SURGERY | Facility: OTHER | Age: 50
End: 2020-02-11

## 2020-02-11 NOTE — TELEPHONE ENCOUNTER
Message received from patient via wifes saloni.     Thank you so much for the wonderful care with my colonoscopy.  I have concern with two hard lumps on my left and right side of my rear groin; they are getting larger. Not sure if you noticed when I had the colonoscopy.  Should I make a appt with you?

## 2020-02-11 NOTE — TELEPHONE ENCOUNTER
I did not either examine that area or notice anything abnormal. Seeing myself/or your primary care doc would be appropriate. It may be hidradenitis with is a sweat gland infection / boils in this area.

## 2020-02-11 NOTE — TELEPHONE ENCOUNTER
Patient notified and transferred to scheduling.  Kelsy Joe LPN........................2/11/2020  3:39 PM

## 2020-02-18 ENCOUNTER — OFFICE VISIT (OUTPATIENT)
Dept: SURGERY | Facility: OTHER | Age: 50
End: 2020-02-18
Attending: SURGERY
Payer: COMMERCIAL

## 2020-02-18 VITALS
RESPIRATION RATE: 16 BRPM | DIASTOLIC BLOOD PRESSURE: 66 MMHG | BODY MASS INDEX: 47.71 KG/M2 | SYSTOLIC BLOOD PRESSURE: 138 MMHG | TEMPERATURE: 97 F | HEART RATE: 68 BPM | WEIGHT: 315 LBS

## 2020-02-18 DIAGNOSIS — D17.1 LIPOMA OF BUTTOCK: Primary | ICD-10-CM

## 2020-02-18 PROCEDURE — 99211 OFF/OP EST MAY X REQ PHY/QHP: CPT | Performed by: SURGERY

## 2020-02-18 PROCEDURE — G0463 HOSPITAL OUTPT CLINIC VISIT: HCPCS

## 2020-02-18 ASSESSMENT — PAIN SCALES - GENERAL: PAINLEVEL: NO PAIN (0)

## 2020-02-18 NOTE — PROGRESS NOTES
Pt examined and found to have two small lipomas between the inferior gluteal crease and the perineum.     Not amenable to drainage or symptomatic necessitating excision    - Observe for infection or growth    - 5 min spent examining and counseling the patient.     Fernandez José MD on 2/18/2020 at 2:08 PM

## 2020-02-18 NOTE — NURSING NOTE
Chief Complaint   Patient presents with     Procedure     lesions in groin       Initial /66 (BP Location: Right arm, Patient Position: Sitting, Cuff Size: Adult Regular)   Pulse 68   Temp 97  F (36.1  C) (Tympanic)   Resp 16   Wt (!) 159.6 kg (351 lb 12.8 oz)   BMI 47.71 kg/m   Estimated body mass index is 47.71 kg/m  as calculated from the following:    Height as of 2/14/19: 1.829 m (6').    Weight as of this encounter: 159.6 kg (351 lb 12.8 oz).  Medication Reconciliation: Completed     Kelsy Joe LPN

## 2020-03-11 ENCOUNTER — HEALTH MAINTENANCE LETTER (OUTPATIENT)
Age: 50
End: 2020-03-11

## 2020-04-11 DIAGNOSIS — F41.9 ANXIETY AND DEPRESSION: ICD-10-CM

## 2020-04-11 DIAGNOSIS — F32.A ANXIETY AND DEPRESSION: ICD-10-CM

## 2020-04-11 NOTE — LETTER
April 13, 2020      Shelton Puentes  [address] [place] [address]        Dear Shelton,     A refill request was received from your pharmacy for Celexa and Trulicity.    Additional refills require an office visit with Dr. Johnston for diabetic follow up and labs.    Please call [phone] to schedule appointment.      Sincerely,      Refill Nurse

## 2020-04-13 RX ORDER — CITALOPRAM HYDROBROMIDE 20 MG/1
TABLET ORAL
Qty: 90 TABLET | Refills: 2 | Status: SHIPPED | OUTPATIENT
Start: 2020-04-13 | End: 2020-07-14

## 2020-04-13 RX ORDER — DULAGLUTIDE 1.5 MG/.5ML
INJECTION, SOLUTION SUBCUTANEOUS
Qty: 2 ML | Refills: 2 | Status: SHIPPED | OUTPATIENT
Start: 2020-04-13 | End: 2020-04-14

## 2020-04-13 NOTE — TELEPHONE ENCOUNTER
Routing refill request to provider for review/approval because:  Drug interaction warning      LOV: 1/16/2020    Patient is due for annual review and labs.  Letter and my chart message sent.    Ananya Mckinley RN on 4/13/2020 at 3:01 PM

## 2020-04-14 ENCOUNTER — VIRTUAL VISIT (OUTPATIENT)
Dept: FAMILY MEDICINE | Facility: OTHER | Age: 50
End: 2020-04-14
Attending: FAMILY MEDICINE
Payer: COMMERCIAL

## 2020-04-14 VITALS — BODY MASS INDEX: 45.84 KG/M2 | WEIGHT: 315 LBS

## 2020-04-14 PROCEDURE — 99441 ZZC PHYSICIAN TELEPHONE EVALUATION 5-10 MIN: CPT | Performed by: FAMILY MEDICINE

## 2020-04-14 RX ORDER — DULAGLUTIDE 1.5 MG/.5ML
1.5 INJECTION, SOLUTION SUBCUTANEOUS
Qty: 2 ML | Refills: 11 | Status: SHIPPED | OUTPATIENT
Start: 2020-04-14

## 2020-04-14 ASSESSMENT — PAIN SCALES - GENERAL: PAINLEVEL: NO PAIN (0)

## 2020-04-14 NOTE — NURSING NOTE
Patient is having telephone visit for medication check to have trulicity refilled.  Kimberly Kinsey LPN .............4/14/2020     2:27 PM      Medication Reconciliation: complete    Kimberly Kinsey LPN  4/14/2020 2:27 PM

## 2020-04-14 NOTE — PROGRESS NOTES
"Shelton Puentes is a 49 year old male who is being evaluated via a billable telephone visit.      The patient has been notified of following:     \"This telephone visit will be conducted via a call between you and your physician/provider. We have found that certain health care needs can be provided without the need for a physical exam.  This service lets us provide the care you need with a short phone conversation.  If a prescription is necessary we can send it directly to your pharmacy.  If lab work is needed we can place an order for that and you can then stop by our lab to have the test done at a later time.    Telephone visits are billed at different rates depending on your insurance coverage. During this emergency period, for some insurers they may be billed the same as an in-person visit.  Please reach out to your insurance provider with any questions.    If during the course of the call the physician/provider feels a telephone visit is not appropriate, you will not be charged for this service.\"    Patient has given verbal consent for Telephone visit?  Yes    How would you like to obtain your AVS? Malindahart    Shelbie     Shelton Puentes is a 49 year old male who presents to clinic today for the following health issues:    Needs a refill of his trulicity.  He has one dose left for this week, then will be out.    Diabetes Follow-up    How often are you checking your blood sugar? Two times daily  Blood sugar testing frequency justification:  fasting 120-140, before bed 140 range.  What time of day are you checking your blood sugars (select all that apply)?  Before meals and At bedtime  Have you had any blood sugars above 200?  No  Have you had any blood sugars below 70?  No    What symptoms do you notice when your blood sugar is low?  Shaky, Dizzy, Weak and Lethargy    What concerns do you have today about your diabetes? None     Do you have any of these symptoms? (Select all that apply)  No " numbness or tingling in feet.  No redness, sores or blisters on feet.  No complaints of excessive thirst.  No reports of blurry vision.  No significant changes to weight.    Eye exam was 2-3 months ago.        BP Readings from Last 2 Encounters:   02/18/20 138/66   01/27/20 107/69     Hemoglobin A1C (%)   Date Value   01/16/2020 10.2 (H)   07/29/2019 7.7 (H)     LDL Cholesterol Calculated (mg/dL)   Date Value   01/16/2020     Cannot estimate LDL when triglyceride exceeds 400 mg/dL   07/29/2019 148 (H)       Patient Active Problem List   Diagnosis     Achilles tendonitis, bilateral     Anxiety and depression     Arthritis of carpometacarpal (CMC) joints of both thumbs     Chronic midline low back pain with left-sided sciatica     Chronic pain of right knee     Uncontrolled type 2 diabetes mellitus without complication, without long-term current use of insulin (H)     Health care maintenance     Lumbar spinal stenosis     Family hx of prostate cancer     Cyst of left kidney     Mixed hyperlipidemia     Morbid obesity with BMI of 50.0-59.9, adult (H)     GWEN on CPAP - uses nightly, finds helpful. Has been beneficial. currently 18 cm H20     Vitamin D deficiency     Primary osteoarthritis involving multiple joints     Chronic neck and back pain     Lipoma of buttock     Past Surgical History:   Procedure Laterality Date     CMC joint replacement       [facility]      COLONOSCOPY N/A 1/27/2020    1 tubular adenoma, follow up 5 years, 1/27/2025     HEMORRHOIDECTOMY BANDING N/A 1/27/2020    Procedure: Hemorrhoidectomy banding;  Surgeon: Fernandez José MD;  Location:  OR     TONSILLECTOMY      as a child       Social History     Tobacco Use     Smoking status: Never Smoker     Smokeless tobacco: Never Used   Substance Use Topics     Alcohol use: Yes     Alcohol/week: 0.0 standard drinks     Comment: Alcoholic Drinks/day: rarely     Family History   Problem Relation Age of Onset     Other - See Comments Father          Psychiatric illness     Other - See Comments Paternal Grandfather         Psychiatric illness     Other - See Comments Paternal Uncle         Psychiatric illness         Current Outpatient Medications   Medication Sig Dispense Refill     ACETAMINOPHEN EXTRA STRENGTH 500 MG tablet TAKE 2 TABLETS BY MOUTH EVERY 6 HOURS AS NEEDED. NO MORE THAN 4,000MG OF ACETAMINOPHEN DAILY 240 tablet 11     blood glucose monitoring (ACCU-CHEK FASTCLIX) lancets USE TO TEST THREE TIMES DAILY 408 each 3     blood glucose monitoring (ACCU-CHEK SMARTVIEW) test strip Test blood sugar three times daily. Dx: E11.9 300 strip 0     Blood Glucose Monitoring Suppl (FIFTY50 GLUCOSE METER 2.0) W/DEVICE KIT Dispense item covered by pt ins. E11.65 NIDDM type II, uncontrolled - Test 3 time/day       citalopram (CELEXA) 20 MG tablet TAKE 3 TABLETS(60 MG) BY MOUTH DAILY 90 tablet 2     D-5000 125 MCG (5000 UT) TABS TAKE ONE TABLET BY MOUTH EVERY  tablet 3     dulaglutide (TRULICITY) 1.5 MG/0.5ML pen Inject 1.5 mg Subcutaneous every 7 days 2 mL 11     methocarbamol (ROBAXIN) 750 MG tablet TAKE 1 TABLET BY MOUTH THREE TIMES DAILY AS NEEDED FOR PAIN 90 tablet 3     omega 3 1000 MG CAPS Take 1 capsule by mouth daily       polyethylene glycol (MIRALAX/GLYCOLAX) powder Take 17 g (1 capful) by mouth daily - for constipation prevention 578 g 1     traMADol (ULTRAM) 50 MG tablet Take 1 tablet (50 mg) by mouth every 6 hours as needed for severe pain 28 tablet 5     Allergies   Allergen Reactions     Metformin Other (See Comments)     Felt very foggy / groggy after taking, when in combination with Robaxin.        Reviewed and updated as needed this visit by Provider         Review of Systems   ROS COMP: Constitutional, HEENT, cardiovascular, pulmonary, gi and gu systems are negative, except as otherwise noted.       Objective   Reported vitals:  Wt (!) 153.3 kg (338 lb)   BMI 45.84 kg/m     healthy, alert and no distress  PSYCH: Alert and oriented  times 3; coherent speech, normal   rate and volume, able to articulate logical thoughts, able   to abstract reason, no tangential thoughts, no hallucinations   or delusions  His affect is normal  RESP: No cough, no audible wheezing, able to talk in full sentences  Remainder of exam unable to be completed due to telephone visits    Diagnostic Test Results:  Labs reviewed in Epic        Assessment/Plan:  1. Uncontrolled type 2 diabetes mellitus without complication, without long-term current use of insulin (H)  trulicity refilled as noted below.  Recommended following up in clinic for diabetes check with Dr. Johnston his primary care provider in the next 1-2 months.  - dulaglutide (TRULICITY) 1.5 MG/0.5ML pen; Inject 1.5 mg Subcutaneous every 7 days  Dispense: 2 mL; Refill: 11    No follow-ups on file.      Phone call duration:  5 minutes    Carlee Collier MD

## 2020-05-13 DIAGNOSIS — K59.09 INTERMITTENT CONSTIPATION: ICD-10-CM

## 2020-05-14 RX ORDER — POLYETHYLENE GLYCOL 3350 17 G/17G
POWDER, FOR SOLUTION ORAL
Qty: 510 G | Refills: 10 | Status: SHIPPED | OUTPATIENT
Start: 2020-05-14

## 2020-05-14 NOTE — TELEPHONE ENCOUNTER
Prescription approved per [facility] Refill Protocol.  LOV: 4/14/2020  Ananya Mckinley RN on 5/14/2020 at 9:42 AM

## 2020-06-22 NOTE — TELEPHONE ENCOUNTER
Routing refill request to provider for review/approval because:  Drug not on the [facility] refill protocol     LOV 5-15-18. Gabapentin filled 12-5-17, unsure of quantity. Maxine Beltran RN on 10/11/2018 at 3:15 PM         Where are these FMLA forms?

## 2020-06-24 ENCOUNTER — MYC MEDICAL ADVICE (OUTPATIENT)
Dept: INTERNAL MEDICINE | Facility: OTHER | Age: 50
End: 2020-06-24

## 2020-06-24 DIAGNOSIS — G89.29 OTHER CHRONIC PAIN: ICD-10-CM

## 2020-06-24 RX ORDER — METHOCARBAMOL 750 MG/1
TABLET, FILM COATED ORAL
Qty: 180 TABLET | Refills: 3 | Status: SHIPPED | OUTPATIENT
Start: 2020-06-24

## 2020-08-07 DIAGNOSIS — E11.9 CONTROLLED TYPE 2 DIABETES MELLITUS WITHOUT COMPLICATION, WITHOUT LONG-TERM CURRENT USE OF INSULIN (H): ICD-10-CM

## 2020-08-07 RX ORDER — LANCETS
EACH MISCELLANEOUS
Qty: 408 EACH | Refills: 3 | Status: SHIPPED | OUTPATIENT
Start: 2020-08-07

## 2020-08-07 NOTE — TELEPHONE ENCOUNTER
"Here@ [facility] sent Rx request for the following:   blood glucose monitoring (ACCU-CHEK FASTCLIX) lancets   Sig: USE TO TEST THREE TIMES DAILY    Last Prescription Date:   04/11/2019  Last Fill Qty/Refills:         408, R-3    Last Office Visit:              04/14/2020   Future Office visit:           None noted       Diabetic Supplies Protocol Failed -         Failed - Recent (6 mo) or future (30 days) visit within the authorizing provider's specialty     Patient had office visit in the last 6 months or has a visit in the next 30 days with authorizing provider.  See \"Patient Info\" tab in inbasket, or \"Choose Columns\" in Meds & Orders section of the refill encounter.            Passed - Medication is active on med list        Passed - Patient is 18 years of age or older           Patient had previous virtual visit on 04/14/2020. Address by Carlee Collier MD, prescription to continue as written. Refill protocol passed. Prescription approved per [facility] Refill [facility]. Will fill for one year.  Haley Mcgowan RN ....................  8/7/2020   3:15 PM      "

## 2020-08-08 ENCOUNTER — MYC REFILL (OUTPATIENT)
Dept: FAMILY MEDICINE | Facility: OTHER | Age: 50
End: 2020-08-08

## 2020-08-08 DIAGNOSIS — E11.9 CONTROLLED TYPE 2 DIABETES MELLITUS WITHOUT COMPLICATION, WITHOUT LONG-TERM CURRENT USE OF INSULIN (H): ICD-10-CM

## 2020-08-13 RX ORDER — BLOOD SUGAR DIAGNOSTIC
STRIP MISCELLANEOUS
Qty: 300 STRIP | Refills: 0 | Status: SHIPPED | OUTPATIENT
Start: 2020-08-13

## 2020-08-13 NOTE — TELEPHONE ENCOUNTER
[facility] sent Rx request for the following:   blood glucose monitoring (ACCU-CHEK SMARTVIEW) test strip   Sig: Test blood sugar three times daily. Dx: E11.9     Last Prescription Date:   11/21/2018  Last Fill Qty/Refills:         300, R-0    Last Office Visit:              04/14/2020-VV   Future Office visit:           none    Prescription refilled per RN Medication Refill Policy.................... Kelsy Lucero RN ....................  8/13/2020   3:10 PM

## 2020-10-29 DIAGNOSIS — G89.29 OTHER CHRONIC PAIN: ICD-10-CM

## 2020-10-29 RX ORDER — PSEUDOEPHED/ACETAMINOPH/DIPHEN 30MG-500MG
TABLET ORAL
Qty: 240 TABLET | Refills: 1 | Status: SHIPPED | OUTPATIENT
Start: 2020-10-29

## 2020-10-29 NOTE — TELEPHONE ENCOUNTER
"Requested Prescriptions   Pending Prescriptions Disp Refills     ACETAMINOPHEN EXTRA STRENGTH 500 MG tablet [Pharmacy Med Name: ACETAMINOPHEN 500MG E/S CAPLETS] 240 tablet 11     Sig: TAKE 2 TABLETS BY MOUTH EVERY 6 HOURS AS NEEDED. NO MORE THAN 4,000MG OF ACETAMINOPHEN DAILY       Analgesics (Non-Narcotic Tylenol and ASA Only) Passed - 10/29/2020  4:04 AM        Passed - Recent (12 mo) or future (30 days) visit within the authorizing provider's specialty     Patient has had an office visit with the authorizing provider or a provider within the authorizing providers department within the previous 12 mos or has a future within next 30 days. See \"Patient Info\" tab in inbasket, or \"Choose Columns\" in Meds & Orders section of the refill encounter.              Passed - Patient is 7 months old or older     If patient is a peds patient of the age 7 mos -12 years, ok to refill using weight-based dosing.     If >3g daily and/or sig is not \"prn\", check for liver enzymes. If normal in the last year, ok to refill.  If not, refer to the provider.          Passed - Medication is active on med list           lov 01/27/2020  Prescription approved per [facility] Refill Protocol.                    "

## 2020-12-17 ENCOUNTER — TELEPHONE (OUTPATIENT)
Dept: INTERNAL MEDICINE | Facility: OTHER | Age: 50
End: 2020-12-17

## 2020-12-17 ENCOUNTER — MYC MEDICAL ADVICE (OUTPATIENT)
Dept: INTERNAL MEDICINE | Facility: OTHER | Age: 50
End: 2020-12-17

## 2020-12-18 ENCOUNTER — MYC MEDICAL ADVICE (OUTPATIENT)
Dept: INTERNAL MEDICINE | Facility: OTHER | Age: 50
End: 2020-12-18

## 2021-01-03 ENCOUNTER — HEALTH MAINTENANCE LETTER (OUTPATIENT)
Age: 51
End: 2021-01-03

## 2021-08-14 ENCOUNTER — HEALTH MAINTENANCE LETTER (OUTPATIENT)
Age: 51
End: 2021-08-14

## 2021-10-09 ENCOUNTER — HEALTH MAINTENANCE LETTER (OUTPATIENT)
Age: 51
End: 2021-10-09

## 2022-01-29 ENCOUNTER — HEALTH MAINTENANCE LETTER (OUTPATIENT)
Age: 52
End: 2022-01-29

## 2022-02-17 PROBLEM — E11.65 TYPE 2 DIABETES MELLITUS WITH HYPERGLYCEMIA (H): Status: ACTIVE | Noted: 2017-07-27

## 2022-03-26 ENCOUNTER — HEALTH MAINTENANCE LETTER (OUTPATIENT)
Age: 52
End: 2022-03-26

## 2022-09-17 ENCOUNTER — HEALTH MAINTENANCE LETTER (OUTPATIENT)
Age: 52
End: 2022-09-17

## 2023-01-28 ENCOUNTER — HEALTH MAINTENANCE LETTER (OUTPATIENT)
Age: 53
End: 2023-01-28

## 2023-05-06 ENCOUNTER — HEALTH MAINTENANCE LETTER (OUTPATIENT)
Age: 53
End: 2023-05-06

## 2023-07-30 ENCOUNTER — HEALTH MAINTENANCE LETTER (OUTPATIENT)
Age: 53
End: 2023-07-30

## 2024-02-25 ENCOUNTER — HEALTH MAINTENANCE LETTER (OUTPATIENT)
Age: 54
End: 2024-02-25

## (undated) DEVICE — ENDO SNARE EXACTO COLD 9MM LOOP 2.4MMX230CM 00711115

## (undated) DEVICE — ENDO BRUSH CHANNEL MASTER CLEANING 2-4.2MM BW-412T

## (undated) DEVICE — SOL WATER 1500ML

## (undated) DEVICE — TUBING SUCTION 10'X3/16" N510

## (undated) DEVICE — ENDO TRAP POLYP E-TRAP 00711099

## (undated) DEVICE — ENDO KIT COMPLIANCE DYKENDOCMPLY

## (undated) DEVICE — SUCTION MANIFOLD NEPTUNE 2 SYS 4 PORT 0702-020-000

## (undated) RX ORDER — PROPOFOL 10 MG/ML
INJECTION, EMULSION INTRAVENOUS
Status: DISPENSED
Start: 2020-01-27

## (undated) RX ORDER — LIDOCAINE HYDROCHLORIDE 20 MG/ML
INJECTION, SOLUTION EPIDURAL; INFILTRATION; INTRACAUDAL; PERINEURAL
Status: DISPENSED
Start: 2020-01-27